# Patient Record
Sex: MALE | Race: BLACK OR AFRICAN AMERICAN | NOT HISPANIC OR LATINO | Employment: UNEMPLOYED | URBAN - METROPOLITAN AREA
[De-identification: names, ages, dates, MRNs, and addresses within clinical notes are randomized per-mention and may not be internally consistent; named-entity substitution may affect disease eponyms.]

---

## 2018-06-20 ENCOUNTER — HISTORICAL (OUTPATIENT)
Dept: ADMINISTRATIVE | Facility: HOSPITAL | Age: 38
End: 2018-06-20

## 2018-06-20 LAB
ALBUMIN SERPL-MCNC: 3.7 GM/DL (ref 3.4–5)
ALBUMIN/GLOB SERPL: 1 RATIO (ref 1–2)
ALP SERPL-CCNC: 88 UNIT/L (ref 45–117)
ALT SERPL-CCNC: 26 UNIT/L (ref 12–78)
AST SERPL-CCNC: 11 UNIT/L (ref 15–37)
BILIRUB SERPL-MCNC: 0.5 MG/DL (ref 0.2–1)
BILIRUBIN DIRECT+TOT PNL SERPL-MCNC: 0.2 MG/DL
BILIRUBIN DIRECT+TOT PNL SERPL-MCNC: 0.3 MG/DL
BUN SERPL-MCNC: 6 MG/DL (ref 7–18)
CALCIUM SERPL-MCNC: 9.3 MG/DL (ref 8.5–10.1)
CHLORIDE SERPL-SCNC: 106 MMOL/L (ref 98–107)
CO2 SERPL-SCNC: 27 MMOL/L (ref 21–32)
CREAT SERPL-MCNC: 1.3 MG/DL (ref 0.6–1.3)
ERYTHROCYTE [DISTWIDTH] IN BLOOD BY AUTOMATED COUNT: 19.4 % (ref 11.5–14.5)
GLOBULIN SER-MCNC: 4.3 GM/ML (ref 2.3–3.5)
GLUCOSE SERPL-MCNC: 84 MG/DL (ref 74–106)
HCT VFR BLD AUTO: 30.8 % (ref 40–51)
HGB BLD-MCNC: 8.6 GM/DL (ref 13.5–17.5)
MCH RBC QN AUTO: 19 PG (ref 26–34)
MCHC RBC AUTO-ENTMCNC: 27.9 GM/DL (ref 31–37)
MCV RBC AUTO: 68.1 FL (ref 80–100)
PLATELET # BLD AUTO: 517 X10(3)/MCL (ref 130–400)
PMV BLD AUTO: 9.7 FL (ref 7.4–10.4)
POTASSIUM SERPL-SCNC: 4.1 MMOL/L (ref 3.5–5.1)
PROT SERPL-MCNC: 8 GM/DL (ref 6.4–8.2)
RBC # BLD AUTO: 4.52 X10(6)/MCL (ref 4.5–5.9)
SODIUM SERPL-SCNC: 140 MMOL/L (ref 136–145)
WBC # SPEC AUTO: 3.9 X10(3)/MCL (ref 4.5–11)

## 2018-06-26 ENCOUNTER — HISTORICAL (OUTPATIENT)
Dept: ADMINISTRATIVE | Facility: HOSPITAL | Age: 38
End: 2018-06-26

## 2018-06-26 LAB
FERRITIN SERPL-MCNC: 8 NG/ML (ref 26–388)
FOLATE SERPL-MCNC: 11.9 NG/ML (ref 3.1–17.5)
IRON SATN MFR SERPL: 4.2 % (ref 15–50)
IRON SERPL-MCNC: 18 MCG/DL (ref 65–175)
TIBC SERPL-MCNC: 428 MCG/DL (ref 250–450)
TRANSFERRIN SERPL-MCNC: 359 MG/DL (ref 200–360)
VIT B12 SERPL-MCNC: 396 PG/ML (ref 193–986)

## 2018-06-27 ENCOUNTER — HISTORICAL (OUTPATIENT)
Dept: SURGERY | Facility: HOSPITAL | Age: 38
End: 2018-06-27

## 2018-07-03 ENCOUNTER — HISTORICAL (OUTPATIENT)
Dept: HEMATOLOGY/ONCOLOGY | Facility: CLINIC | Age: 38
End: 2018-07-03

## 2018-07-10 ENCOUNTER — HISTORICAL (OUTPATIENT)
Dept: INFUSION THERAPY | Facility: HOSPITAL | Age: 38
End: 2018-07-10

## 2018-07-10 LAB
ABS NEUT (OLG): 1.71 X10(3)/MCL (ref 2.1–9.2)
ALBUMIN SERPL-MCNC: 3.6 GM/DL (ref 3.4–5)
ALBUMIN/GLOB SERPL: 1 RATIO (ref 1–2)
ALP SERPL-CCNC: 75 UNIT/L (ref 45–117)
ALT SERPL-CCNC: 14 UNIT/L (ref 12–78)
AST SERPL-CCNC: 12 UNIT/L (ref 15–37)
BASOPHILS # BLD AUTO: 0.02 X10(3)/MCL
BASOPHILS NFR BLD AUTO: 0 %
BILIRUB SERPL-MCNC: 0.4 MG/DL (ref 0.2–1)
BILIRUBIN DIRECT+TOT PNL SERPL-MCNC: 0.1 MG/DL
BILIRUBIN DIRECT+TOT PNL SERPL-MCNC: 0.3 MG/DL
BUN SERPL-MCNC: 6 MG/DL (ref 7–18)
CALCIUM SERPL-MCNC: 8.8 MG/DL (ref 8.5–10.1)
CEA SERPL-MCNC: 30.1 NG/ML
CHLORIDE SERPL-SCNC: 105 MMOL/L (ref 98–107)
CO2 SERPL-SCNC: 25 MMOL/L (ref 21–32)
CREAT SERPL-MCNC: 1.3 MG/DL (ref 0.6–1.3)
EOSINOPHIL # BLD AUTO: 0.18 X10(3)/MCL
EOSINOPHIL NFR BLD AUTO: 5 %
ERYTHROCYTE [DISTWIDTH] IN BLOOD BY AUTOMATED COUNT: 18.1 % (ref 11.5–14.5)
GLOBULIN SER-MCNC: 4.1 GM/ML (ref 2.3–3.5)
GLUCOSE SERPL-MCNC: 97 MG/DL (ref 74–106)
HCT VFR BLD AUTO: 30.8 % (ref 40–51)
HGB BLD-MCNC: 8.5 GM/DL (ref 13.5–17.5)
IMM GRANULOCYTES # BLD AUTO: 0.01 10*3/UL
IMM GRANULOCYTES NFR BLD AUTO: 0 %
LYMPHOCYTES # BLD AUTO: 1.7 X10(3)/MCL
LYMPHOCYTES NFR BLD AUTO: 44 % (ref 13–40)
MCH RBC QN AUTO: 18.7 PG (ref 26–34)
MCHC RBC AUTO-ENTMCNC: 27.6 GM/DL (ref 31–37)
MCV RBC AUTO: 67.8 FL (ref 80–100)
MONOCYTES # BLD AUTO: 0.27 X10(3)/MCL
MONOCYTES NFR BLD AUTO: 7 % (ref 4–12)
NEUTROPHILS # BLD AUTO: 1.71 X10(3)/MCL
NEUTROPHILS NFR BLD AUTO: 44 X10(3)/MCL
PLATELET # BLD AUTO: 303 X10(3)/MCL (ref 130–400)
PMV BLD AUTO: 9.2 FL (ref 7.4–10.4)
POTASSIUM SERPL-SCNC: 3.8 MMOL/L (ref 3.5–5.1)
PROT SERPL-MCNC: 7.7 GM/DL (ref 6.4–8.2)
RBC # BLD AUTO: 4.54 X10(6)/MCL (ref 4.5–5.9)
SODIUM SERPL-SCNC: 138 MMOL/L (ref 136–145)
WBC # SPEC AUTO: 3.9 X10(3)/MCL (ref 4.5–11)

## 2018-07-12 ENCOUNTER — HISTORICAL (OUTPATIENT)
Dept: INFUSION THERAPY | Facility: HOSPITAL | Age: 38
End: 2018-07-12

## 2018-07-23 ENCOUNTER — HISTORICAL (OUTPATIENT)
Dept: ADMINISTRATIVE | Facility: HOSPITAL | Age: 38
End: 2018-07-23

## 2018-07-23 LAB
ABS NEUT (OLG): 0.65 X10(3)/MCL (ref 2.1–9.2)
ALBUMIN SERPL-MCNC: 3.6 GM/DL (ref 3.4–5)
ALBUMIN/GLOB SERPL: 1 RATIO (ref 1–2)
ALP SERPL-CCNC: 67 UNIT/L (ref 45–117)
ALT SERPL-CCNC: 15 UNIT/L (ref 12–78)
AST SERPL-CCNC: 10 UNIT/L (ref 15–37)
BASOPHILS # BLD AUTO: 0.02 X10(3)/MCL
BASOPHILS NFR BLD AUTO: 1 %
BILIRUB SERPL-MCNC: 0.4 MG/DL (ref 0.2–1)
BILIRUBIN DIRECT+TOT PNL SERPL-MCNC: 0.1 MG/DL
BILIRUBIN DIRECT+TOT PNL SERPL-MCNC: 0.3 MG/DL
BUN SERPL-MCNC: 5 MG/DL (ref 7–18)
CALCIUM SERPL-MCNC: 8.5 MG/DL (ref 8.5–10.1)
CEA SERPL-MCNC: 35.2 NG/ML
CHLORIDE SERPL-SCNC: 108 MMOL/L (ref 98–107)
CO2 SERPL-SCNC: 28 MMOL/L (ref 21–32)
CREAT SERPL-MCNC: 1.3 MG/DL (ref 0.6–1.3)
EOSINOPHIL # BLD AUTO: 0.12 10*3/UL
EOSINOPHIL NFR BLD AUTO: 5 %
ERYTHROCYTE [DISTWIDTH] IN BLOOD BY AUTOMATED COUNT: 18.6 % (ref 11.5–14.5)
GLOBULIN SER-MCNC: 3.8 GM/ML (ref 2.3–3.5)
GLUCOSE SERPL-MCNC: 92 MG/DL (ref 74–106)
HCT VFR BLD AUTO: 29.7 % (ref 40–51)
HGB BLD-MCNC: 8.3 GM/DL (ref 13.5–17.5)
IMM GRANULOCYTES # BLD AUTO: 0.01 10*3/UL
IMM GRANULOCYTES NFR BLD AUTO: 0 %
LYMPHOCYTES # BLD AUTO: 1.29 X10(3)/MCL
LYMPHOCYTES NFR BLD AUTO: 56 % (ref 13–40)
MCH RBC QN AUTO: 19.1 PG (ref 26–34)
MCHC RBC AUTO-ENTMCNC: 27.9 GM/DL (ref 31–37)
MCV RBC AUTO: 68.4 FL (ref 80–100)
MONOCYTES # BLD AUTO: 0.22 X10(3)/MCL
MONOCYTES NFR BLD AUTO: 10 % (ref 4–12)
NEUTROPHILS # BLD AUTO: 0.65 X10(3)/MCL
NEUTROPHILS NFR BLD AUTO: 28 X10(3)/MCL
PLATELET # BLD AUTO: 256 X10(3)/MCL (ref 130–400)
PMV BLD AUTO: 9.5 FL (ref 7.4–10.4)
POTASSIUM SERPL-SCNC: 3.9 MMOL/L (ref 3.5–5.1)
PROT SERPL-MCNC: 7.4 GM/DL (ref 6.4–8.2)
RBC # BLD AUTO: 4.34 X10(6)/MCL (ref 4.5–5.9)
SODIUM SERPL-SCNC: 144 MMOL/L (ref 136–145)
WBC # SPEC AUTO: 2.3 X10(3)/MCL (ref 4.5–11)

## 2018-07-31 ENCOUNTER — HISTORICAL (OUTPATIENT)
Dept: INFUSION THERAPY | Facility: HOSPITAL | Age: 38
End: 2018-07-31

## 2018-07-31 LAB
ABS NEUT (OLG): 0.7 X10(3)/MCL (ref 2.1–9.2)
ALBUMIN SERPL-MCNC: 3.7 GM/DL (ref 3.4–5)
ALBUMIN/GLOB SERPL: 1 RATIO (ref 1–2)
ALP SERPL-CCNC: 67 UNIT/L (ref 45–117)
ALT SERPL-CCNC: 16 UNIT/L (ref 12–78)
AST SERPL-CCNC: 12 UNIT/L (ref 15–37)
BASOPHILS # BLD AUTO: 0.02 X10(3)/MCL
BASOPHILS NFR BLD AUTO: 1 %
BILIRUB SERPL-MCNC: 0.4 MG/DL (ref 0.2–1)
BILIRUBIN DIRECT+TOT PNL SERPL-MCNC: 0.1 MG/DL
BILIRUBIN DIRECT+TOT PNL SERPL-MCNC: 0.3 MG/DL
BUN SERPL-MCNC: 9 MG/DL (ref 7–18)
CALCIUM SERPL-MCNC: 8.6 MG/DL (ref 8.5–10.1)
CHLORIDE SERPL-SCNC: 107 MMOL/L (ref 98–107)
CO2 SERPL-SCNC: 26 MMOL/L (ref 21–32)
CREAT SERPL-MCNC: 1.4 MG/DL (ref 0.6–1.3)
EOSINOPHIL # BLD AUTO: 0.02 10*3/UL
EOSINOPHIL NFR BLD AUTO: 1 %
ERYTHROCYTE [DISTWIDTH] IN BLOOD BY AUTOMATED COUNT: 18.6 % (ref 11.5–14.5)
GLOBULIN SER-MCNC: 3.7 GM/ML (ref 2.3–3.5)
GLUCOSE SERPL-MCNC: 93 MG/DL (ref 74–106)
HCT VFR BLD AUTO: 29.4 % (ref 40–51)
HGB BLD-MCNC: 8.3 GM/DL (ref 13.5–17.5)
IMM GRANULOCYTES # BLD AUTO: 0.01 10*3/UL
IMM GRANULOCYTES NFR BLD AUTO: 0 %
LYMPHOCYTES # BLD AUTO: 1.32 X10(3)/MCL
LYMPHOCYTES NFR BLD AUTO: 54 % (ref 13–40)
MCH RBC QN AUTO: 19.1 PG (ref 26–34)
MCHC RBC AUTO-ENTMCNC: 28.2 GM/DL (ref 31–37)
MCV RBC AUTO: 67.6 FL (ref 80–100)
MONOCYTES # BLD AUTO: 0.37 X10(3)/MCL
MONOCYTES NFR BLD AUTO: 15 % (ref 4–12)
NEUTROPHILS # BLD AUTO: 0.7 X10(3)/MCL
NEUTROPHILS NFR BLD AUTO: 29 X10(3)/MCL
PLATELET # BLD AUTO: 255 X10(3)/MCL (ref 130–400)
PMV BLD AUTO: 9 FL (ref 7.4–10.4)
POTASSIUM SERPL-SCNC: 3.8 MMOL/L (ref 3.5–5.1)
PROT SERPL-MCNC: 7.4 GM/DL (ref 6.4–8.2)
RBC # BLD AUTO: 4.35 X10(6)/MCL (ref 4.5–5.9)
SODIUM SERPL-SCNC: 141 MMOL/L (ref 136–145)
WBC # SPEC AUTO: 2.4 X10(3)/MCL (ref 4.5–11)

## 2018-08-07 ENCOUNTER — HISTORICAL (OUTPATIENT)
Dept: INFUSION THERAPY | Facility: HOSPITAL | Age: 38
End: 2018-08-07

## 2018-08-07 LAB
ABS NEUT (OLG): 1.55 X10(3)/MCL (ref 2.1–9.2)
ALBUMIN SERPL-MCNC: 3.6 GM/DL (ref 3.4–5)
ALBUMIN/GLOB SERPL: 1 RATIO (ref 1–2)
ALP SERPL-CCNC: 71 UNIT/L (ref 45–117)
ALT SERPL-CCNC: 15 UNIT/L (ref 12–78)
AST SERPL-CCNC: 13 UNIT/L (ref 15–37)
BASOPHILS # BLD AUTO: 0.03 X10(3)/MCL
BASOPHILS NFR BLD AUTO: 1 %
BILIRUB SERPL-MCNC: 0.4 MG/DL (ref 0.2–1)
BILIRUBIN DIRECT+TOT PNL SERPL-MCNC: 0.1 MG/DL
BILIRUBIN DIRECT+TOT PNL SERPL-MCNC: 0.3 MG/DL
BUN SERPL-MCNC: 7 MG/DL (ref 7–18)
CALCIUM SERPL-MCNC: 8.4 MG/DL (ref 8.5–10.1)
CHLORIDE SERPL-SCNC: 106 MMOL/L (ref 98–107)
CO2 SERPL-SCNC: 28 MMOL/L (ref 21–32)
CREAT SERPL-MCNC: 1.3 MG/DL (ref 0.6–1.3)
EOSINOPHIL # BLD AUTO: 0.07 10*3/UL
EOSINOPHIL NFR BLD AUTO: 2 %
ERYTHROCYTE [DISTWIDTH] IN BLOOD BY AUTOMATED COUNT: 18.6 % (ref 11.5–14.5)
GLOBULIN SER-MCNC: 3.9 GM/ML (ref 2.3–3.5)
GLUCOSE SERPL-MCNC: 105 MG/DL (ref 74–106)
HCT VFR BLD AUTO: 31.1 % (ref 40–51)
HGB BLD-MCNC: 8.7 GM/DL (ref 13.5–17.5)
LYMPHOCYTES # BLD AUTO: 1.55 X10(3)/MCL
LYMPHOCYTES NFR BLD AUTO: 44 % (ref 13–40)
MCH RBC QN AUTO: 19.1 PG (ref 26–34)
MCHC RBC AUTO-ENTMCNC: 28 GM/DL (ref 31–37)
MCV RBC AUTO: 68.4 FL (ref 80–100)
MONOCYTES # BLD AUTO: 0.33 X10(3)/MCL
MONOCYTES NFR BLD AUTO: 9 % (ref 4–12)
NEUTROPHILS # BLD AUTO: 1.55 X10(3)/MCL
NEUTROPHILS NFR BLD AUTO: 44 X10(3)/MCL
PLATELET # BLD AUTO: 263 X10(3)/MCL (ref 130–400)
PMV BLD AUTO: 9.5 FL (ref 7.4–10.4)
POTASSIUM SERPL-SCNC: 3.9 MMOL/L (ref 3.5–5.1)
PROT SERPL-MCNC: 7.5 GM/DL (ref 6.4–8.2)
RBC # BLD AUTO: 4.55 X10(6)/MCL (ref 4.5–5.9)
SODIUM SERPL-SCNC: 141 MMOL/L (ref 136–145)
WBC # SPEC AUTO: 3.5 X10(3)/MCL (ref 4.5–11)

## 2018-08-09 ENCOUNTER — HISTORICAL (OUTPATIENT)
Dept: INFUSION THERAPY | Facility: HOSPITAL | Age: 38
End: 2018-08-09

## 2018-08-16 ENCOUNTER — HISTORICAL (OUTPATIENT)
Dept: ADMINISTRATIVE | Facility: HOSPITAL | Age: 38
End: 2018-08-16

## 2018-08-16 LAB
ABS NEUT (OLG): 1.32 X10(3)/MCL (ref 2.1–9.2)
ALBUMIN SERPL-MCNC: 3.6 GM/DL (ref 3.4–5)
ALBUMIN/GLOB SERPL: 1 RATIO (ref 1–2)
ALP SERPL-CCNC: 74 UNIT/L (ref 45–117)
ALT SERPL-CCNC: 16 UNIT/L (ref 12–78)
AST SERPL-CCNC: 11 UNIT/L (ref 15–37)
BASOPHILS # BLD AUTO: 0.01 X10(3)/MCL
BASOPHILS NFR BLD AUTO: 0 %
BILIRUB SERPL-MCNC: 0.4 MG/DL (ref 0.2–1)
BILIRUBIN DIRECT+TOT PNL SERPL-MCNC: 0.1 MG/DL
BILIRUBIN DIRECT+TOT PNL SERPL-MCNC: 0.3 MG/DL
BUN SERPL-MCNC: 8 MG/DL (ref 7–18)
CALCIUM SERPL-MCNC: 8.8 MG/DL (ref 8.5–10.1)
CEA SERPL-MCNC: 24.3 NG/ML
CHLORIDE SERPL-SCNC: 106 MMOL/L (ref 98–107)
CO2 SERPL-SCNC: 28 MMOL/L (ref 21–32)
CREAT SERPL-MCNC: 1.4 MG/DL (ref 0.6–1.3)
EOSINOPHIL # BLD AUTO: 0.03 10*3/UL
EOSINOPHIL NFR BLD AUTO: 1 %
ERYTHROCYTE [DISTWIDTH] IN BLOOD BY AUTOMATED COUNT: 19.6 % (ref 11.5–14.5)
FERRITIN SERPL-MCNC: 5.2 NG/ML (ref 26–388)
GLOBULIN SER-MCNC: 4 GM/ML (ref 2.3–3.5)
GLUCOSE SERPL-MCNC: 89 MG/DL (ref 74–106)
HCT VFR BLD AUTO: 31 % (ref 40–51)
HGB BLD-MCNC: 8.7 GM/DL (ref 13.5–17.5)
IMM GRANULOCYTES # BLD AUTO: 0.02 10*3/UL
IMM GRANULOCYTES NFR BLD AUTO: 1 %
IRON SATN MFR SERPL: 4.4 % (ref 15–50)
IRON SERPL-MCNC: 19 MCG/DL (ref 65–175)
LYMPHOCYTES # BLD AUTO: 1.38 X10(3)/MCL
LYMPHOCYTES NFR BLD AUTO: 46 % (ref 13–40)
MCH RBC QN AUTO: 19.2 PG (ref 26–34)
MCHC RBC AUTO-ENTMCNC: 28.1 GM/DL (ref 31–37)
MCV RBC AUTO: 68.6 FL (ref 80–100)
MONOCYTES # BLD AUTO: 0.25 X10(3)/MCL
MONOCYTES NFR BLD AUTO: 8 % (ref 4–12)
NEUTROPHILS # BLD AUTO: 1.32 X10(3)/MCL
NEUTROPHILS NFR BLD AUTO: 44 X10(3)/MCL
PLATELET # BLD AUTO: 297 X10(3)/MCL (ref 130–400)
PMV BLD AUTO: 9.9 FL (ref 7.4–10.4)
POTASSIUM SERPL-SCNC: 4 MMOL/L (ref 3.5–5.1)
PROT SERPL-MCNC: 7.6 GM/DL (ref 6.4–8.2)
RBC # BLD AUTO: 4.52 X10(6)/MCL (ref 4.5–5.9)
SODIUM SERPL-SCNC: 140 MMOL/L (ref 136–145)
TIBC SERPL-MCNC: 430 MCG/DL (ref 250–450)
TRANSFERRIN SERPL-MCNC: 342 MG/DL (ref 200–360)
WBC # SPEC AUTO: 3 X10(3)/MCL (ref 4.5–11)

## 2018-08-20 ENCOUNTER — HISTORICAL (OUTPATIENT)
Dept: INFUSION THERAPY | Facility: HOSPITAL | Age: 38
End: 2018-08-20

## 2018-08-20 LAB — PROT UR STRIP-MCNC: 10.4 MG/DL

## 2018-08-22 ENCOUNTER — HISTORICAL (OUTPATIENT)
Dept: INFUSION THERAPY | Facility: HOSPITAL | Age: 38
End: 2018-08-22

## 2018-08-24 ENCOUNTER — HISTORICAL (OUTPATIENT)
Dept: INFUSION THERAPY | Facility: HOSPITAL | Age: 38
End: 2018-08-24

## 2018-08-31 ENCOUNTER — HISTORICAL (OUTPATIENT)
Dept: INFUSION THERAPY | Facility: HOSPITAL | Age: 38
End: 2018-08-31

## 2018-09-04 ENCOUNTER — HISTORICAL (OUTPATIENT)
Dept: ADMINISTRATIVE | Facility: HOSPITAL | Age: 38
End: 2018-09-04

## 2018-09-04 LAB
ABS NEUT (OLG): 1.21 X10(3)/MCL (ref 2.1–9.2)
ALBUMIN SERPL-MCNC: 3.6 GM/DL (ref 3.4–5)
ALBUMIN/GLOB SERPL: 1 RATIO (ref 1–2)
ALP SERPL-CCNC: 78 UNIT/L (ref 45–117)
ALT SERPL-CCNC: 18 UNIT/L (ref 12–78)
APPEARANCE, UA: CLEAR
AST SERPL-CCNC: 17 UNIT/L (ref 15–37)
BACTERIA #/AREA URNS AUTO: ABNORMAL /[HPF]
BASOPHILS # BLD AUTO: 0.02 X10(3)/MCL
BASOPHILS NFR BLD AUTO: 1 %
BILIRUB SERPL-MCNC: 0.3 MG/DL (ref 0.2–1)
BILIRUB UR QL STRIP: NEGATIVE
BILIRUBIN DIRECT+TOT PNL SERPL-MCNC: <0.1 MG/DL
BILIRUBIN DIRECT+TOT PNL SERPL-MCNC: ABNORMAL MG/DL
BUN SERPL-MCNC: 6 MG/DL (ref 7–18)
CALCIUM SERPL-MCNC: 8.8 MG/DL (ref 8.5–10.1)
CHLORIDE SERPL-SCNC: 107 MMOL/L (ref 98–107)
CO2 SERPL-SCNC: 30 MMOL/L (ref 21–32)
COLOR UR: NORMAL
CREAT SERPL-MCNC: 1.4 MG/DL (ref 0.6–1.3)
EOSINOPHIL # BLD AUTO: 0.06 10*3/UL
EOSINOPHIL NFR BLD AUTO: 2 %
ERYTHROCYTE [DISTWIDTH] IN BLOOD BY AUTOMATED COUNT: 27.1 % (ref 11.5–14.5)
GLOBULIN SER-MCNC: 3.9 GM/ML (ref 2.3–3.5)
GLUCOSE (UA): NORMAL
GLUCOSE SERPL-MCNC: 87 MG/DL (ref 74–106)
HCT VFR BLD AUTO: 35.7 % (ref 40–51)
HGB BLD-MCNC: 10.2 GM/DL (ref 13.5–17.5)
HGB UR QL STRIP: NEGATIVE
HYALINE CASTS #/AREA URNS LPF: ABNORMAL /[LPF]
IMM GRANULOCYTES # BLD AUTO: 0.01 10*3/UL
IMM GRANULOCYTES NFR BLD AUTO: 0 %
KETONES UR QL STRIP: NEGATIVE
LEUKOCYTE ESTERASE UR QL STRIP: NEGATIVE
LYMPHOCYTES # BLD AUTO: 1.87 X10(3)/MCL
LYMPHOCYTES NFR BLD AUTO: 53 % (ref 13–40)
MCH RBC QN AUTO: 20.9 PG (ref 26–34)
MCHC RBC AUTO-ENTMCNC: 28.6 GM/DL (ref 31–37)
MCV RBC AUTO: 73.2 FL (ref 80–100)
MONOCYTES # BLD AUTO: 0.36 X10(3)/MCL
MONOCYTES NFR BLD AUTO: 10 % (ref 4–12)
NEUTROPHILS # BLD AUTO: 1.21 X10(3)/MCL
NEUTROPHILS NFR BLD AUTO: 34 X10(3)/MCL
NITRITE UR QL STRIP: NEGATIVE
PH UR STRIP: 5.5 [PH] (ref 4.5–8)
PLATELET # BLD AUTO: 181 X10(3)/MCL (ref 130–400)
PMV BLD AUTO: 11 FL (ref 7.4–10.4)
POTASSIUM SERPL-SCNC: 3.7 MMOL/L (ref 3.5–5.1)
PROT SERPL-MCNC: 7.5 GM/DL (ref 6.4–8.2)
PROT UR QL STRIP: NEGATIVE
RBC # BLD AUTO: 4.88 X10(6)/MCL (ref 4.5–5.9)
RBC #/AREA URNS AUTO: ABNORMAL /[HPF]
SODIUM SERPL-SCNC: 142 MMOL/L (ref 136–145)
SP GR UR STRIP: 1.02 (ref 1–1.03)
SQUAMOUS #/AREA URNS LPF: ABNORMAL /[LPF]
UROBILINOGEN UR STRIP-ACNC: NORMAL
WBC # SPEC AUTO: 3.5 X10(3)/MCL (ref 4.5–11)
WBC #/AREA URNS AUTO: ABNORMAL /HPF

## 2018-09-05 ENCOUNTER — HISTORICAL (OUTPATIENT)
Dept: INFUSION THERAPY | Facility: HOSPITAL | Age: 38
End: 2018-09-05

## 2018-09-07 ENCOUNTER — HISTORICAL (OUTPATIENT)
Dept: INFUSION THERAPY | Facility: HOSPITAL | Age: 38
End: 2018-09-07

## 2018-09-11 ENCOUNTER — HISTORICAL (OUTPATIENT)
Dept: RADIOLOGY | Facility: HOSPITAL | Age: 38
End: 2018-09-11

## 2018-09-18 ENCOUNTER — HISTORICAL (OUTPATIENT)
Dept: ADMINISTRATIVE | Facility: HOSPITAL | Age: 38
End: 2018-09-18

## 2018-09-18 LAB
ABS NEUT (OLG): 0.75 X10(3)/MCL (ref 2.1–9.2)
ALBUMIN SERPL-MCNC: 3.5 GM/DL (ref 3.4–5)
ALBUMIN/GLOB SERPL: 1 RATIO (ref 1–2)
ALP SERPL-CCNC: 68 UNIT/L (ref 45–117)
ALT SERPL-CCNC: 20 UNIT/L (ref 12–78)
AST SERPL-CCNC: 14 UNIT/L (ref 15–37)
BASOPHILS # BLD AUTO: 0.02 X10(3)/MCL
BASOPHILS NFR BLD AUTO: 1 %
BILIRUB SERPL-MCNC: 0.3 MG/DL (ref 0.2–1)
BILIRUBIN DIRECT+TOT PNL SERPL-MCNC: <0.1 MG/DL
BILIRUBIN DIRECT+TOT PNL SERPL-MCNC: ABNORMAL MG/DL
BUN SERPL-MCNC: 8 MG/DL (ref 7–18)
CALCIUM SERPL-MCNC: 8.6 MG/DL (ref 8.5–10.1)
CEA SERPL-MCNC: 13.6 NG/ML
CHLORIDE SERPL-SCNC: 105 MMOL/L (ref 98–107)
CO2 SERPL-SCNC: 28 MMOL/L (ref 21–32)
CREAT SERPL-MCNC: 1.1 MG/DL (ref 0.6–1.3)
EOSINOPHIL # BLD AUTO: 0.04 X10(3)/MCL
EOSINOPHIL NFR BLD AUTO: 2 %
ERYTHROCYTE [DISTWIDTH] IN BLOOD BY AUTOMATED COUNT: 27.5 % (ref 11.5–14.5)
GLOBULIN SER-MCNC: 3.7 GM/ML (ref 2.3–3.5)
GLUCOSE SERPL-MCNC: 80 MG/DL (ref 74–106)
HCT VFR BLD AUTO: 36.2 % (ref 40–51)
HGB BLD-MCNC: 10.6 GM/DL (ref 13.5–17.5)
LYMPHOCYTES # BLD AUTO: 1.53 X10(3)/MCL
LYMPHOCYTES NFR BLD AUTO: 56 % (ref 13–40)
MCH RBC QN AUTO: 21.9 PG (ref 26–34)
MCHC RBC AUTO-ENTMCNC: 29.3 GM/DL (ref 31–37)
MCV RBC AUTO: 74.6 FL (ref 80–100)
MONOCYTES # BLD AUTO: 0.38 X10(3)/MCL
MONOCYTES NFR BLD AUTO: 14 % (ref 4–12)
NEUTROPHILS # BLD AUTO: 0.75 X10(3)/MCL
NEUTROPHILS NFR BLD AUTO: 28 X10(3)/MCL
PLATELET # BLD AUTO: 201 X10(3)/MCL (ref 130–400)
PMV BLD AUTO: 10.2 FL (ref 7.4–10.4)
POTASSIUM SERPL-SCNC: 3.5 MMOL/L (ref 3.5–5.1)
PROT SERPL-MCNC: 7.2 GM/DL (ref 6.4–8.2)
RBC # BLD AUTO: 4.85 X10(6)/MCL (ref 4.5–5.9)
SODIUM SERPL-SCNC: 140 MMOL/L (ref 136–145)
WBC # SPEC AUTO: 2.7 X10(3)/MCL (ref 4.5–11)

## 2018-09-24 ENCOUNTER — HISTORICAL (OUTPATIENT)
Dept: INFUSION THERAPY | Facility: HOSPITAL | Age: 38
End: 2018-09-24

## 2018-09-24 LAB
ABS NEUT (OLG): 1.21 X10(3)/MCL
ALBUMIN SERPL-MCNC: 3.3 GM/DL (ref 3.4–5)
ALBUMIN/GLOB SERPL: 1 RATIO (ref 1–2)
ALP SERPL-CCNC: 66 UNIT/L (ref 45–117)
ALT SERPL-CCNC: 24 UNIT/L (ref 12–78)
ANISOCYTOSIS BLD QL SMEAR: NORMAL
AST SERPL-CCNC: 24 UNIT/L (ref 15–37)
BASOPHILS # BLD AUTO: 0.02 X10(3)/MCL
BASOPHILS NFR BLD AUTO: 1 %
BILIRUB SERPL-MCNC: 0.3 MG/DL (ref 0.2–1)
BILIRUBIN DIRECT+TOT PNL SERPL-MCNC: <0.1 MG/DL
BILIRUBIN DIRECT+TOT PNL SERPL-MCNC: ABNORMAL MG/DL
BUN SERPL-MCNC: 7 MG/DL (ref 7–18)
CALCIUM SERPL-MCNC: 8.6 MG/DL (ref 8.5–10.1)
CHLORIDE SERPL-SCNC: 106 MMOL/L (ref 98–107)
CO2 SERPL-SCNC: 29 MMOL/L (ref 21–32)
CREAT SERPL-MCNC: 1.2 MG/DL (ref 0.6–1.3)
EOSINOPHIL # BLD AUTO: 0.03 X10(3)/MCL
EOSINOPHIL NFR BLD AUTO: 1 %
ERYTHROCYTE [DISTWIDTH] IN BLOOD BY AUTOMATED COUNT: 28.3 % (ref 11.5–14.5)
GLOBULIN SER-MCNC: 3.8 GM/ML (ref 2.3–3.5)
GLUCOSE SERPL-MCNC: 88 MG/DL (ref 74–106)
HCT VFR BLD AUTO: 37.3 % (ref 40–51)
HGB BLD-MCNC: 11 GM/DL (ref 13.5–17.5)
HYPOCHROMIA BLD QL SMEAR: NORMAL
IMM GRANULOCYTES # BLD AUTO: 0.01 10*3/UL
IMM GRANULOCYTES NFR BLD AUTO: 0 %
LYMPHOCYTES # BLD AUTO: 1.63 X10(3)/MCL
LYMPHOCYTES NFR BLD AUTO: 49 % (ref 13–40)
MCH RBC QN AUTO: 22.2 PG (ref 26–34)
MCHC RBC AUTO-ENTMCNC: 29.5 GM/DL (ref 31–37)
MCV RBC AUTO: 75.2 FL (ref 80–100)
MICROCYTES BLD QL SMEAR: NORMAL
MONOCYTES # BLD AUTO: 0.4 X10(3)/MCL
MONOCYTES NFR BLD AUTO: 12 % (ref 4–12)
NEUTROPHILS # BLD AUTO: 1.21 X10(3)/MCL
NEUTROPHILS NFR BLD AUTO: 37 %
OVALOCYTES BLD QL SMEAR: NORMAL
PLATELET # BLD AUTO: 229 X10(3)/MCL (ref 130–400)
PLATELET # BLD EST: ADEQUATE 10*3/UL
PMV BLD AUTO: 9.5 FL (ref 7.4–10.4)
POIKILOCYTOSIS BLD QL SMEAR: NORMAL
POLYCHROMASIA BLD QL SMEAR: NORMAL
POTASSIUM SERPL-SCNC: 3.7 MMOL/L (ref 3.5–5.1)
PROT SERPL-MCNC: 7.1 GM/DL (ref 6.4–8.2)
PROT UR STRIP-MCNC: 10.4 MG/DL
RBC # BLD AUTO: 4.96 X10(6)/MCL (ref 4.5–5.9)
RBC MORPH BLD: NORMAL
SODIUM SERPL-SCNC: 141 MMOL/L (ref 136–145)
WBC # SPEC AUTO: 3.3 X10(3)/MCL (ref 4.5–11)

## 2018-09-26 ENCOUNTER — HISTORICAL (OUTPATIENT)
Dept: INFUSION THERAPY | Facility: HOSPITAL | Age: 38
End: 2018-09-26

## 2018-09-27 ENCOUNTER — HISTORICAL (OUTPATIENT)
Dept: ENDOSCOPY | Facility: HOSPITAL | Age: 38
End: 2018-09-27

## 2018-10-08 ENCOUNTER — HISTORICAL (OUTPATIENT)
Dept: INFUSION THERAPY | Facility: HOSPITAL | Age: 38
End: 2018-10-08

## 2018-10-08 LAB
ABS NEUT (OLG): 2.09 X10(3)/MCL (ref 2.1–9.2)
ALBUMIN SERPL-MCNC: 3 GM/DL (ref 3.4–5)
ALBUMIN/GLOB SERPL: 1 RATIO (ref 1–2)
ALP SERPL-CCNC: 86 UNIT/L (ref 45–117)
ALT SERPL-CCNC: 21 UNIT/L (ref 12–78)
ANISOCYTOSIS BLD QL SMEAR: ABNORMAL
APPEARANCE, UA: CLEAR
AST SERPL-CCNC: 25 UNIT/L (ref 15–37)
BACTERIA #/AREA URNS AUTO: ABNORMAL /[HPF]
BASOPHILS # BLD AUTO: 0.01 X10(3)/MCL
BASOPHILS NFR BLD AUTO: 0 %
BASOPHILS NFR BLD MANUAL: 0 %
BILIRUB SERPL-MCNC: 0.2 MG/DL (ref 0.2–1)
BILIRUB UR QL STRIP: NEGATIVE
BILIRUBIN DIRECT+TOT PNL SERPL-MCNC: <0.1 MG/DL
BILIRUBIN DIRECT+TOT PNL SERPL-MCNC: ABNORMAL MG/DL
BUN SERPL-MCNC: 8 MG/DL (ref 7–18)
CALCIUM SERPL-MCNC: 8.4 MG/DL (ref 8.5–10.1)
CHLORIDE SERPL-SCNC: 107 MMOL/L (ref 98–107)
CO2 SERPL-SCNC: 30 MMOL/L (ref 21–32)
COLOR UR: NORMAL
CREAT SERPL-MCNC: 1.3 MG/DL (ref 0.6–1.3)
EOSINOPHIL # BLD AUTO: 0.07 10*3/UL
EOSINOPHIL NFR BLD AUTO: 2 %
EOSINOPHIL NFR BLD MANUAL: 0 %
ERYTHROCYTE [DISTWIDTH] IN BLOOD BY AUTOMATED COUNT: 26.4 % (ref 11.5–14.5)
GLOBULIN SER-MCNC: 4.4 GM/ML (ref 2.3–3.5)
GLUCOSE (UA): NORMAL
GLUCOSE SERPL-MCNC: 120 MG/DL (ref 74–106)
GRANULOCYTES NFR BLD MANUAL: 60 % (ref 43–75)
HCT VFR BLD AUTO: 36.3 % (ref 40–51)
HGB BLD-MCNC: 10.8 GM/DL (ref 13.5–17.5)
HGB UR QL STRIP: NEGATIVE
HYALINE CASTS #/AREA URNS LPF: ABNORMAL /[LPF]
HYPOCHROMIA BLD QL SMEAR: ABNORMAL
IMM GRANULOCYTES # BLD AUTO: 0.01 10*3/UL
IMM GRANULOCYTES NFR BLD AUTO: 0 %
KETONES UR QL STRIP: NEGATIVE
LEUKOCYTE ESTERASE UR QL STRIP: NEGATIVE
LYMPHOCYTES # BLD AUTO: 1.83 X10(3)/MCL
LYMPHOCYTES NFR BLD AUTO: 42 % (ref 13–40)
LYMPHOCYTES NFR BLD MANUAL: 1 %
LYMPHOCYTES NFR BLD MANUAL: 27 % (ref 20.5–51.1)
MACROCYTES BLD QL SMEAR: ABNORMAL
MCH RBC QN AUTO: 22.9 PG (ref 26–34)
MCHC RBC AUTO-ENTMCNC: 29.8 GM/DL (ref 31–37)
MCV RBC AUTO: 77.1 FL (ref 80–100)
MICROCYTES BLD QL SMEAR: ABNORMAL
MONOCYTES # BLD AUTO: 0.37 X10(3)/MCL
MONOCYTES NFR BLD AUTO: 8 % (ref 4–12)
MONOCYTES NFR BLD MANUAL: 12 % (ref 2–9)
NEUTROPHILS # BLD AUTO: 2.09 X10(3)/MCL
NEUTROPHILS NFR BLD AUTO: 48 X10(3)/MCL
NITRITE UR QL STRIP: NEGATIVE
OVALOCYTES BLD QL SMEAR: ABNORMAL
PH UR STRIP: 6 [PH] (ref 4.5–8)
PLATELET # BLD AUTO: 249 X10(3)/MCL (ref 130–400)
PLATELET # BLD EST: ADEQUATE 10*3/UL
PMV BLD AUTO: 9.5 FL (ref 7.4–10.4)
POIKILOCYTOSIS BLD QL SMEAR: ABNORMAL
POLYCHROMASIA BLD QL SMEAR: ABNORMAL
POTASSIUM SERPL-SCNC: 3.5 MMOL/L (ref 3.5–5.1)
PROT SERPL-MCNC: 7.4 GM/DL (ref 6.4–8.2)
PROT UR QL STRIP: NEGATIVE
RBC # BLD AUTO: 4.71 X10(6)/MCL (ref 4.5–5.9)
RBC #/AREA URNS AUTO: ABNORMAL /[HPF]
RBC MORPH BLD: ABNORMAL
SODIUM SERPL-SCNC: 141 MMOL/L (ref 136–145)
SP GR UR STRIP: 1.01 (ref 1–1.03)
SQUAMOUS #/AREA URNS LPF: ABNORMAL /[LPF]
UROBILINOGEN UR STRIP-ACNC: NORMAL
WBC # SPEC AUTO: 4.4 X10(3)/MCL (ref 4.5–11)
WBC #/AREA URNS AUTO: ABNORMAL /HPF

## 2018-10-15 ENCOUNTER — HISTORICAL (OUTPATIENT)
Dept: INFUSION THERAPY | Facility: HOSPITAL | Age: 38
End: 2018-10-15

## 2018-10-15 LAB
ABS NEUT (OLG): 1.56 X10(3)/MCL (ref 2.1–9.2)
ALBUMIN SERPL-MCNC: 3.6 GM/DL (ref 3.4–5)
ALBUMIN/GLOB SERPL: 1 RATIO (ref 1–2)
ALP SERPL-CCNC: 90 UNIT/L (ref 45–117)
ALT SERPL-CCNC: 21 UNIT/L (ref 12–78)
APPEARANCE, UA: CLEAR
AST SERPL-CCNC: 21 UNIT/L (ref 15–37)
BACTERIA #/AREA URNS AUTO: ABNORMAL /[HPF]
BASOPHILS # BLD AUTO: 0.03 X10(3)/MCL
BASOPHILS NFR BLD AUTO: 1 %
BILIRUB SERPL-MCNC: 0.3 MG/DL (ref 0.2–1)
BILIRUB UR QL STRIP: NEGATIVE
BILIRUBIN DIRECT+TOT PNL SERPL-MCNC: <0.1 MG/DL
BILIRUBIN DIRECT+TOT PNL SERPL-MCNC: ABNORMAL MG/DL
BUN SERPL-MCNC: 14 MG/DL (ref 7–18)
CALCIUM SERPL-MCNC: 9.1 MG/DL (ref 8.5–10.1)
CHLORIDE SERPL-SCNC: 103 MMOL/L (ref 98–107)
CO2 SERPL-SCNC: 31 MMOL/L (ref 21–32)
COLOR UR: NORMAL
CREAT SERPL-MCNC: 1.4 MG/DL (ref 0.6–1.3)
EOSINOPHIL # BLD AUTO: 0.06 X10(3)/MCL
EOSINOPHIL NFR BLD AUTO: 2 %
ERYTHROCYTE [DISTWIDTH] IN BLOOD BY AUTOMATED COUNT: 27.9 % (ref 11.5–14.5)
GLOBULIN SER-MCNC: 4.6 GM/ML (ref 2.3–3.5)
GLUCOSE (UA): NORMAL
GLUCOSE SERPL-MCNC: 91 MG/DL (ref 74–106)
HCT VFR BLD AUTO: 39.8 % (ref 40–51)
HGB BLD-MCNC: 11.8 GM/DL (ref 13.5–17.5)
HGB UR QL STRIP: NEGATIVE
HYALINE CASTS #/AREA URNS LPF: ABNORMAL /[LPF]
IMM GRANULOCYTES # BLD AUTO: 0.01 10*3/UL
IMM GRANULOCYTES NFR BLD AUTO: 0 %
KETONES UR QL STRIP: NEGATIVE
LEUKOCYTE ESTERASE UR QL STRIP: NEGATIVE
LYMPHOCYTES # BLD AUTO: 1.52 X10(3)/MCL
LYMPHOCYTES NFR BLD AUTO: 42 % (ref 13–40)
MCH RBC QN AUTO: 23.2 PG (ref 26–34)
MCHC RBC AUTO-ENTMCNC: 29.6 GM/DL (ref 31–37)
MCV RBC AUTO: 78.2 FL (ref 80–100)
MONOCYTES # BLD AUTO: 0.43 X10(3)/MCL
MONOCYTES NFR BLD AUTO: 12 % (ref 4–12)
NEUTROPHILS # BLD AUTO: 1.56 X10(3)/MCL
NEUTROPHILS NFR BLD AUTO: 43 X10(3)/MCL
NITRITE UR QL STRIP: NEGATIVE
PH UR STRIP: 6.5 [PH] (ref 4.5–8)
PLATELET # BLD AUTO: 324 X10(3)/MCL (ref 130–400)
PMV BLD AUTO: 9.3 FL (ref 7.4–10.4)
POTASSIUM SERPL-SCNC: 3.7 MMOL/L (ref 3.5–5.1)
PROT SERPL-MCNC: 8.2 GM/DL (ref 6.4–8.2)
PROT UR QL STRIP: NEGATIVE
RBC # BLD AUTO: 5.09 X10(6)/MCL (ref 4.5–5.9)
RBC #/AREA URNS AUTO: ABNORMAL /[HPF]
SODIUM SERPL-SCNC: 140 MMOL/L (ref 136–145)
SP GR UR STRIP: 1.01 (ref 1–1.03)
SQUAMOUS #/AREA URNS LPF: ABNORMAL /[LPF]
UROBILINOGEN UR STRIP-ACNC: NORMAL
WBC # SPEC AUTO: 3.6 X10(3)/MCL (ref 4.5–11)
WBC #/AREA URNS AUTO: ABNORMAL /HPF

## 2018-10-17 ENCOUNTER — HISTORICAL (OUTPATIENT)
Dept: INFUSION THERAPY | Facility: HOSPITAL | Age: 38
End: 2018-10-17

## 2018-10-26 ENCOUNTER — HISTORICAL (OUTPATIENT)
Dept: ADMINISTRATIVE | Facility: HOSPITAL | Age: 38
End: 2018-10-26

## 2018-10-26 LAB
ABS NEUT (OLG): 1.12 X10(3)/MCL (ref 2.1–9.2)
ALBUMIN SERPL-MCNC: 3.3 GM/DL (ref 3.4–5)
ALBUMIN/GLOB SERPL: 1 RATIO (ref 1–2)
ALP SERPL-CCNC: 72 UNIT/L (ref 45–117)
ALT SERPL-CCNC: 25 UNIT/L (ref 12–78)
APPEARANCE, UA: CLEAR
AST SERPL-CCNC: 24 UNIT/L (ref 15–37)
BACTERIA #/AREA URNS AUTO: ABNORMAL /[HPF]
BASOPHILS # BLD AUTO: 0.01 X10(3)/MCL
BASOPHILS NFR BLD AUTO: 0 %
BILIRUB SERPL-MCNC: 0.3 MG/DL (ref 0.2–1)
BILIRUB UR QL STRIP: NEGATIVE
BILIRUBIN DIRECT+TOT PNL SERPL-MCNC: <0.1 MG/DL
BILIRUBIN DIRECT+TOT PNL SERPL-MCNC: ABNORMAL MG/DL
BUN SERPL-MCNC: 9 MG/DL (ref 7–18)
CALCIUM SERPL-MCNC: 8.8 MG/DL (ref 8.5–10.1)
CEA SERPL-MCNC: 5.2 NG/ML
CHLORIDE SERPL-SCNC: 108 MMOL/L (ref 98–107)
CO2 SERPL-SCNC: 28 MMOL/L (ref 21–32)
COLOR UR: NORMAL
CREAT SERPL-MCNC: 1.3 MG/DL (ref 0.6–1.3)
EOSINOPHIL # BLD AUTO: 0.02 X10(3)/MCL
EOSINOPHIL NFR BLD AUTO: 1 %
ERYTHROCYTE [DISTWIDTH] IN BLOOD BY AUTOMATED COUNT: 26.5 % (ref 11.5–14.5)
GLOBULIN SER-MCNC: 3.9 GM/ML (ref 2.3–3.5)
GLUCOSE (UA): NORMAL
GLUCOSE SERPL-MCNC: 102 MG/DL (ref 74–106)
HCT VFR BLD AUTO: 39 % (ref 40–51)
HGB BLD-MCNC: 11.9 GM/DL (ref 13.5–17.5)
HGB UR QL STRIP: NEGATIVE
HYALINE CASTS #/AREA URNS LPF: ABNORMAL /[LPF]
IMM GRANULOCYTES # BLD AUTO: 0.01 10*3/UL
IMM GRANULOCYTES NFR BLD AUTO: 0 %
KETONES UR QL STRIP: NEGATIVE
LEUKOCYTE ESTERASE UR QL STRIP: NEGATIVE
LYMPHOCYTES # BLD AUTO: 1.78 X10(3)/MCL
LYMPHOCYTES NFR BLD AUTO: 55 % (ref 13–40)
MCH RBC QN AUTO: 23.9 PG (ref 26–34)
MCHC RBC AUTO-ENTMCNC: 30.5 GM/DL (ref 31–37)
MCV RBC AUTO: 78.3 FL (ref 80–100)
MONOCYTES # BLD AUTO: 0.32 X10(3)/MCL
MONOCYTES NFR BLD AUTO: 10 % (ref 4–12)
NEUTROPHILS # BLD AUTO: 1.12 X10(3)/MCL
NEUTROPHILS NFR BLD AUTO: 34 X10(3)/MCL
NITRITE UR QL STRIP: NEGATIVE
PH UR STRIP: 6 [PH] (ref 4.5–8)
PLATELET # BLD AUTO: 232 X10(3)/MCL (ref 130–400)
PMV BLD AUTO: 9.7 FL (ref 7.4–10.4)
POTASSIUM SERPL-SCNC: 3.4 MMOL/L (ref 3.5–5.1)
PROT SERPL-MCNC: 7.2 GM/DL (ref 6.4–8.2)
PROT UR QL STRIP: NEGATIVE
RBC # BLD AUTO: 4.98 X10(6)/MCL (ref 4.5–5.9)
RBC #/AREA URNS AUTO: ABNORMAL /[HPF]
SODIUM SERPL-SCNC: 141 MMOL/L (ref 136–145)
SP GR UR STRIP: 1.01 (ref 1–1.03)
SQUAMOUS #/AREA URNS LPF: ABNORMAL /[LPF]
UROBILINOGEN UR STRIP-ACNC: NORMAL
WBC # SPEC AUTO: 3.3 X10(3)/MCL (ref 4.5–11)
WBC #/AREA URNS AUTO: ABNORMAL /HPF

## 2018-10-29 ENCOUNTER — HISTORICAL (OUTPATIENT)
Dept: INFUSION THERAPY | Facility: HOSPITAL | Age: 38
End: 2018-10-29

## 2018-10-31 ENCOUNTER — HISTORICAL (OUTPATIENT)
Dept: INFUSION THERAPY | Facility: HOSPITAL | Age: 38
End: 2018-10-31

## 2018-11-12 ENCOUNTER — HISTORICAL (OUTPATIENT)
Dept: INFUSION THERAPY | Facility: HOSPITAL | Age: 38
End: 2018-11-12

## 2018-11-12 LAB
ABS NEUT (OLG): 1.59 X10(3)/MCL (ref 2.1–9.2)
ALBUMIN SERPL-MCNC: 3.5 GM/DL (ref 3.4–5)
ALBUMIN/GLOB SERPL: 1 RATIO (ref 1–2)
ALP SERPL-CCNC: 76 UNIT/L (ref 45–117)
ALT SERPL-CCNC: 28 UNIT/L (ref 12–78)
AST SERPL-CCNC: 27 UNIT/L (ref 15–37)
BASOPHILS # BLD AUTO: 0.02 X10(3)/MCL
BASOPHILS NFR BLD AUTO: 0 %
BILIRUB SERPL-MCNC: 0.3 MG/DL (ref 0.2–1)
BILIRUBIN DIRECT+TOT PNL SERPL-MCNC: <0.1 MG/DL
BILIRUBIN DIRECT+TOT PNL SERPL-MCNC: ABNORMAL MG/DL
BUN SERPL-MCNC: 15 MG/DL (ref 7–18)
CALCIUM SERPL-MCNC: 9.1 MG/DL (ref 8.5–10.1)
CEA SERPL-MCNC: 4.3 NG/ML
CHLORIDE SERPL-SCNC: 102 MMOL/L (ref 98–107)
CO2 SERPL-SCNC: 30 MMOL/L (ref 21–32)
CREAT SERPL-MCNC: 1.4 MG/DL (ref 0.6–1.3)
EOSINOPHIL # BLD AUTO: 0.04 X10(3)/MCL
EOSINOPHIL NFR BLD AUTO: 1 %
ERYTHROCYTE [DISTWIDTH] IN BLOOD BY AUTOMATED COUNT: 23.7 % (ref 11.5–14.5)
GLOBULIN SER-MCNC: 4.3 GM/ML (ref 2.3–3.5)
GLUCOSE SERPL-MCNC: 103 MG/DL (ref 74–106)
HCT VFR BLD AUTO: 38.8 % (ref 40–51)
HGB BLD-MCNC: 12.3 GM/DL (ref 13.5–17.5)
LYMPHOCYTES # BLD AUTO: 1.71 X10(3)/MCL
LYMPHOCYTES NFR BLD AUTO: 46 % (ref 13–40)
MCH RBC QN AUTO: 24.8 PG (ref 26–34)
MCHC RBC AUTO-ENTMCNC: 31.7 GM/DL (ref 31–37)
MCV RBC AUTO: 78.4 FL (ref 80–100)
MONOCYTES # BLD AUTO: 0.33 X10(3)/MCL
MONOCYTES NFR BLD AUTO: 9 % (ref 4–12)
NEUTROPHILS # BLD AUTO: 1.59 X10(3)/MCL
NEUTROPHILS NFR BLD AUTO: 43 X10(3)/MCL
PLATELET # BLD AUTO: 179 X10(3)/MCL (ref 130–400)
PMV BLD AUTO: 9.9 FL (ref 7.4–10.4)
POTASSIUM SERPL-SCNC: 3.4 MMOL/L (ref 3.5–5.1)
PROT SERPL-MCNC: 7.8 GM/DL (ref 6.4–8.2)
PROT UR STRIP-MCNC: 7.6 MG/DL
RBC # BLD AUTO: 4.95 X10(6)/MCL (ref 4.5–5.9)
SODIUM SERPL-SCNC: 138 MMOL/L (ref 136–145)
WBC # SPEC AUTO: 3.7 X10(3)/MCL (ref 4.5–11)

## 2018-11-14 ENCOUNTER — HISTORICAL (OUTPATIENT)
Dept: INFUSION THERAPY | Facility: HOSPITAL | Age: 38
End: 2018-11-14

## 2018-11-26 ENCOUNTER — HISTORICAL (OUTPATIENT)
Dept: ADMINISTRATIVE | Facility: HOSPITAL | Age: 38
End: 2018-11-26

## 2018-11-26 LAB
ABS NEUT (OLG): 1.72 X10(3)/MCL (ref 2.1–9.2)
ALBUMIN SERPL-MCNC: 3.6 GM/DL (ref 3.4–5)
ALBUMIN/GLOB SERPL: 1 RATIO (ref 1–2)
ALP SERPL-CCNC: 68 UNIT/L (ref 45–117)
ALT SERPL-CCNC: 23 UNIT/L (ref 12–78)
AST SERPL-CCNC: 24 UNIT/L (ref 15–37)
BASOPHILS # BLD AUTO: 0.02 X10(3)/MCL
BASOPHILS NFR BLD AUTO: 0 %
BILIRUB SERPL-MCNC: 0.3 MG/DL (ref 0.2–1)
BILIRUBIN DIRECT+TOT PNL SERPL-MCNC: 0.1 MG/DL
BILIRUBIN DIRECT+TOT PNL SERPL-MCNC: 0.2 MG/DL
BUN SERPL-MCNC: 21 MG/DL (ref 7–18)
CALCIUM SERPL-MCNC: 9.1 MG/DL (ref 8.5–10.1)
CEA SERPL-MCNC: 4.4 NG/ML
CHLORIDE SERPL-SCNC: 104 MMOL/L (ref 98–107)
CO2 SERPL-SCNC: 28 MMOL/L (ref 21–32)
CREAT SERPL-MCNC: 1.5 MG/DL (ref 0.6–1.3)
EOSINOPHIL # BLD AUTO: 0.04 10*3/UL
EOSINOPHIL NFR BLD AUTO: 1 %
ERYTHROCYTE [DISTWIDTH] IN BLOOD BY AUTOMATED COUNT: 21.5 % (ref 11.5–14.5)
GLOBULIN SER-MCNC: 4.3 GM/ML (ref 2.3–3.5)
GLUCOSE SERPL-MCNC: 98 MG/DL (ref 74–106)
HCT VFR BLD AUTO: 40 % (ref 40–51)
HGB BLD-MCNC: 12.3 GM/DL (ref 13.5–17.5)
IMM GRANULOCYTES # BLD AUTO: 0.01 10*3/UL
IMM GRANULOCYTES NFR BLD AUTO: 0 %
LYMPHOCYTES # BLD AUTO: 1.75 X10(3)/MCL
LYMPHOCYTES NFR BLD AUTO: 44 % (ref 13–40)
MCH RBC QN AUTO: 24.6 PG (ref 26–34)
MCHC RBC AUTO-ENTMCNC: 30.8 GM/DL (ref 31–37)
MCV RBC AUTO: 80.2 FL (ref 80–100)
MONOCYTES # BLD AUTO: 0.4 X10(3)/MCL
MONOCYTES NFR BLD AUTO: 10 % (ref 4–12)
NEUTROPHILS # BLD AUTO: 1.72 X10(3)/MCL
NEUTROPHILS NFR BLD AUTO: 44 X10(3)/MCL
PLATELET # BLD AUTO: 205 X10(3)/MCL (ref 130–400)
PMV BLD AUTO: 9.7 FL (ref 7.4–10.4)
POTASSIUM SERPL-SCNC: 3.6 MMOL/L (ref 3.5–5.1)
PROT SERPL-MCNC: 7.9 GM/DL (ref 6.4–8.2)
PROT UR STRIP-MCNC: 5.1 MG/DL
RBC # BLD AUTO: 4.99 X10(6)/MCL (ref 4.5–5.9)
SODIUM SERPL-SCNC: 141 MMOL/L (ref 136–145)
WBC # SPEC AUTO: 3.9 X10(3)/MCL (ref 4.5–11)

## 2018-11-27 ENCOUNTER — HISTORICAL (OUTPATIENT)
Dept: INFUSION THERAPY | Facility: HOSPITAL | Age: 38
End: 2018-11-27

## 2018-11-29 ENCOUNTER — HISTORICAL (OUTPATIENT)
Dept: INFUSION THERAPY | Facility: HOSPITAL | Age: 38
End: 2018-11-29

## 2018-12-05 ENCOUNTER — HISTORICAL (OUTPATIENT)
Dept: RADIOLOGY | Facility: HOSPITAL | Age: 38
End: 2018-12-05

## 2018-12-10 ENCOUNTER — HISTORICAL (OUTPATIENT)
Dept: ADMINISTRATIVE | Facility: HOSPITAL | Age: 38
End: 2018-12-10

## 2018-12-10 LAB
ABS NEUT (OLG): 0.96 X10(3)/MCL
ALBUMIN SERPL-MCNC: 3.4 GM/DL (ref 3.4–5)
ALBUMIN/GLOB SERPL: 1 RATIO (ref 1–2)
ALP SERPL-CCNC: 67 UNIT/L (ref 45–117)
ALT SERPL-CCNC: 22 UNIT/L (ref 12–78)
ANISOCYTOSIS BLD QL SMEAR: ABNORMAL
AST SERPL-CCNC: 21 UNIT/L (ref 15–37)
BASOPHILS NFR BLD MANUAL: 0 %
BILIRUB SERPL-MCNC: 0.3 MG/DL (ref 0.2–1)
BILIRUBIN DIRECT+TOT PNL SERPL-MCNC: 0.1 MG/DL
BILIRUBIN DIRECT+TOT PNL SERPL-MCNC: 0.2 MG/DL
BUN SERPL-MCNC: 9 MG/DL (ref 7–18)
CALCIUM SERPL-MCNC: 8.1 MG/DL (ref 8.5–10.1)
CEA SERPL-MCNC: 4.1 NG/ML
CHLORIDE SERPL-SCNC: 106 MMOL/L (ref 98–107)
CO2 SERPL-SCNC: 29 MMOL/L (ref 21–32)
CREAT SERPL-MCNC: 1.3 MG/DL (ref 0.6–1.3)
EOSINOPHIL NFR BLD MANUAL: 1 %
ERYTHROCYTE [DISTWIDTH] IN BLOOD BY AUTOMATED COUNT: 19.9 % (ref 11.5–14.5)
GLOBULIN SER-MCNC: 3.7 GM/ML (ref 2.3–3.5)
GLUCOSE SERPL-MCNC: 90 MG/DL (ref 74–106)
GRANULOCYTES NFR BLD MANUAL: 35 % (ref 43–75)
HCT VFR BLD AUTO: 37.2 % (ref 40–51)
HGB BLD-MCNC: 11.8 GM/DL (ref 13.5–17.5)
HYPOCHROMIA BLD QL SMEAR: ABNORMAL
LYMPHOCYTES NFR BLD MANUAL: 1 %
LYMPHOCYTES NFR BLD MANUAL: 52 % (ref 20.5–51.1)
MCH RBC QN AUTO: 25.3 PG (ref 26–34)
MCHC RBC AUTO-ENTMCNC: 31.7 GM/DL (ref 31–37)
MCV RBC AUTO: 79.8 FL (ref 80–100)
MICROCYTES BLD QL SMEAR: ABNORMAL
MONOCYTES NFR BLD MANUAL: 11 % (ref 2–9)
OVALOCYTES BLD QL SMEAR: ABNORMAL
PLATELET # BLD AUTO: 187 X10(3)/MCL (ref 130–400)
PLATELET # BLD EST: ADEQUATE 10*3/UL
PMV BLD AUTO: 9.5 FL (ref 7.4–10.4)
POIKILOCYTOSIS BLD QL SMEAR: ABNORMAL
POLYCHROMASIA BLD QL SMEAR: ABNORMAL
POTASSIUM SERPL-SCNC: 3.8 MMOL/L (ref 3.5–5.1)
PROT SERPL-MCNC: 7.1 GM/DL (ref 6.4–8.2)
PROT UR STRIP-MCNC: 8.2 MG/DL
RBC # BLD AUTO: 4.66 X10(6)/MCL (ref 4.5–5.9)
RBC MORPH BLD: ABNORMAL
SODIUM SERPL-SCNC: 141 MMOL/L (ref 136–145)
WBC # SPEC AUTO: 2.9 X10(3)/MCL (ref 4.5–11)

## 2018-12-11 ENCOUNTER — HISTORICAL (OUTPATIENT)
Dept: INFUSION THERAPY | Facility: HOSPITAL | Age: 38
End: 2018-12-11

## 2018-12-17 ENCOUNTER — HISTORICAL (OUTPATIENT)
Dept: INFUSION THERAPY | Facility: HOSPITAL | Age: 38
End: 2018-12-17

## 2018-12-17 LAB
ABS NEUT (OLG): 1.04 X10(3)/MCL (ref 2.1–9.2)
ALBUMIN SERPL-MCNC: 3.6 GM/DL (ref 3.4–5)
ALBUMIN/GLOB SERPL: 1 RATIO (ref 1–2)
ALP SERPL-CCNC: 70 UNIT/L (ref 45–117)
ALT SERPL-CCNC: 26 UNIT/L (ref 12–78)
AST SERPL-CCNC: 19 UNIT/L (ref 15–37)
BASOPHILS # BLD AUTO: 0.02 X10(3)/MCL
BASOPHILS NFR BLD AUTO: 1 %
BILIRUB SERPL-MCNC: 0.3 MG/DL (ref 0.2–1)
BILIRUBIN DIRECT+TOT PNL SERPL-MCNC: 0.1 MG/DL
BILIRUBIN DIRECT+TOT PNL SERPL-MCNC: 0.2 MG/DL
BUN SERPL-MCNC: 13 MG/DL (ref 7–18)
CALCIUM SERPL-MCNC: 8.5 MG/DL (ref 8.5–10.1)
CEA SERPL-MCNC: 3.2 NG/ML
CHLORIDE SERPL-SCNC: 99 MMOL/L (ref 98–107)
CO2 SERPL-SCNC: 32 MMOL/L (ref 21–32)
CREAT SERPL-MCNC: 1.4 MG/DL (ref 0.6–1.3)
EOSINOPHIL # BLD AUTO: 0.04 X10(3)/MCL
EOSINOPHIL NFR BLD AUTO: 1 %
ERYTHROCYTE [DISTWIDTH] IN BLOOD BY AUTOMATED COUNT: 19.5 % (ref 11.5–14.5)
GLOBULIN SER-MCNC: 4.2 GM/ML (ref 2.3–3.5)
GLUCOSE SERPL-MCNC: 104 MG/DL (ref 74–106)
HCT VFR BLD AUTO: 39.7 % (ref 40–51)
HGB BLD-MCNC: 12.5 GM/DL (ref 13.5–17.5)
IMM GRANULOCYTES # BLD AUTO: 0.01 10*3/UL
IMM GRANULOCYTES NFR BLD AUTO: 0 %
LYMPHOCYTES # BLD AUTO: 1.82 X10(3)/MCL
LYMPHOCYTES NFR BLD AUTO: 54 % (ref 13–40)
MCH RBC QN AUTO: 25.8 PG (ref 26–34)
MCHC RBC AUTO-ENTMCNC: 31.5 GM/DL (ref 31–37)
MCV RBC AUTO: 82 FL (ref 80–100)
MONOCYTES # BLD AUTO: 0.42 X10(3)/MCL
MONOCYTES NFR BLD AUTO: 12 % (ref 4–12)
NEUTROPHILS # BLD AUTO: 1.04 X10(3)/MCL
NEUTROPHILS NFR BLD AUTO: 31 X10(3)/MCL
PLATELET # BLD AUTO: 245 X10(3)/MCL (ref 130–400)
PMV BLD AUTO: 10.4 FL (ref 7.4–10.4)
POTASSIUM SERPL-SCNC: 3.8 MMOL/L (ref 3.5–5.1)
PROT SERPL-MCNC: 7.8 GM/DL (ref 6.4–8.2)
PROT UR STRIP-MCNC: <5 MG/DL
RBC # BLD AUTO: 4.84 X10(6)/MCL (ref 4.5–5.9)
SODIUM SERPL-SCNC: 136 MMOL/L (ref 136–145)
WBC # SPEC AUTO: 3.4 X10(3)/MCL (ref 4.5–11)

## 2018-12-19 ENCOUNTER — HISTORICAL (OUTPATIENT)
Dept: INFUSION THERAPY | Facility: HOSPITAL | Age: 38
End: 2018-12-19

## 2018-12-31 ENCOUNTER — HISTORICAL (OUTPATIENT)
Dept: INFUSION THERAPY | Facility: HOSPITAL | Age: 38
End: 2018-12-31

## 2018-12-31 LAB
ABS NEUT (OLG): 1.75 X10(3)/MCL (ref 2.1–9.2)
ALBUMIN SERPL-MCNC: 3.1 GM/DL (ref 3.4–5)
ALBUMIN/GLOB SERPL: 1 RATIO (ref 1–2)
ALP SERPL-CCNC: 64 UNIT/L (ref 45–117)
ALT SERPL-CCNC: 22 UNIT/L (ref 12–78)
AST SERPL-CCNC: 22 UNIT/L (ref 15–37)
BASOPHILS # BLD AUTO: 0.02 X10(3)/MCL
BASOPHILS NFR BLD AUTO: 0 %
BILIRUB SERPL-MCNC: 0.3 MG/DL (ref 0.2–1)
BILIRUBIN DIRECT+TOT PNL SERPL-MCNC: <0.1 MG/DL
BILIRUBIN DIRECT+TOT PNL SERPL-MCNC: ABNORMAL MG/DL
BUN SERPL-MCNC: 16 MG/DL (ref 7–18)
CALCIUM SERPL-MCNC: 8.6 MG/DL (ref 8.5–10.1)
CEA SERPL-MCNC: 4.1 NG/ML
CHLORIDE SERPL-SCNC: 102 MMOL/L (ref 98–107)
CO2 SERPL-SCNC: 29 MMOL/L (ref 21–32)
CREAT SERPL-MCNC: 1.4 MG/DL (ref 0.6–1.3)
EOSINOPHIL # BLD AUTO: 0.05 10*3/UL
EOSINOPHIL NFR BLD AUTO: 1 %
ERYTHROCYTE [DISTWIDTH] IN BLOOD BY AUTOMATED COUNT: 18.4 % (ref 11.5–14.5)
GLOBULIN SER-MCNC: 3.9 GM/ML (ref 2.3–3.5)
GLUCOSE SERPL-MCNC: 67 MG/DL (ref 74–106)
HCT VFR BLD AUTO: 37.7 % (ref 40–51)
HGB BLD-MCNC: 11.6 GM/DL (ref 13.5–17.5)
IMM GRANULOCYTES # BLD AUTO: 0.01 10*3/UL
IMM GRANULOCYTES NFR BLD AUTO: 0 %
LYMPHOCYTES # BLD AUTO: 2.16 X10(3)/MCL
LYMPHOCYTES NFR BLD AUTO: 48 % (ref 13–40)
MCH RBC QN AUTO: 25.6 PG (ref 26–34)
MCHC RBC AUTO-ENTMCNC: 30.8 GM/DL (ref 31–37)
MCV RBC AUTO: 83.2 FL (ref 80–100)
MONOCYTES # BLD AUTO: 0.47 X10(3)/MCL
MONOCYTES NFR BLD AUTO: 10 % (ref 4–12)
NEUTROPHILS # BLD AUTO: 1.75 X10(3)/MCL
NEUTROPHILS NFR BLD AUTO: 39 X10(3)/MCL
PLATELET # BLD AUTO: 187 X10(3)/MCL (ref 130–400)
PMV BLD AUTO: 9.6 FL (ref 7.4–10.4)
POTASSIUM SERPL-SCNC: 3.3 MMOL/L (ref 3.5–5.1)
PROT SERPL-MCNC: 7 GM/DL (ref 6.4–8.2)
PROT UR STRIP-MCNC: 6.5 MG/DL
RBC # BLD AUTO: 4.53 X10(6)/MCL (ref 4.5–5.9)
SODIUM SERPL-SCNC: 139 MMOL/L (ref 136–145)
WBC # SPEC AUTO: 4.5 X10(3)/MCL (ref 4.5–11)

## 2019-01-02 ENCOUNTER — HISTORICAL (OUTPATIENT)
Dept: INFUSION THERAPY | Facility: HOSPITAL | Age: 39
End: 2019-01-02

## 2019-01-10 ENCOUNTER — HISTORICAL (OUTPATIENT)
Dept: ADMINISTRATIVE | Facility: HOSPITAL | Age: 39
End: 2019-01-10

## 2019-01-10 LAB
ABS NEUT (OLG): 1.03 X10(3)/MCL (ref 2.1–9.2)
ALBUMIN SERPL-MCNC: 3.4 GM/DL (ref 3.4–5)
ALBUMIN/GLOB SERPL: 1 RATIO (ref 1–2)
ALP SERPL-CCNC: 66 UNIT/L (ref 45–117)
ALT SERPL-CCNC: 32 UNIT/L (ref 12–78)
AST SERPL-CCNC: 22 UNIT/L (ref 15–37)
BASOPHILS # BLD AUTO: 0.02 X10(3)/MCL
BASOPHILS NFR BLD AUTO: 1 %
BILIRUB SERPL-MCNC: 0.3 MG/DL (ref 0.2–1)
BILIRUBIN DIRECT+TOT PNL SERPL-MCNC: 0.1 MG/DL
BILIRUBIN DIRECT+TOT PNL SERPL-MCNC: 0.2 MG/DL
BUN SERPL-MCNC: 10 MG/DL (ref 7–18)
CALCIUM SERPL-MCNC: 8.4 MG/DL (ref 8.5–10.1)
CHLORIDE SERPL-SCNC: 105 MMOL/L (ref 98–107)
CO2 SERPL-SCNC: 29 MMOL/L (ref 21–32)
CREAT SERPL-MCNC: 1.3 MG/DL (ref 0.6–1.3)
EOSINOPHIL # BLD AUTO: 0.03 10*3/UL
EOSINOPHIL NFR BLD AUTO: 1 %
ERYTHROCYTE [DISTWIDTH] IN BLOOD BY AUTOMATED COUNT: 18.5 % (ref 11.5–14.5)
GLOBULIN SER-MCNC: 4.3 GM/ML (ref 2.3–3.5)
GLUCOSE SERPL-MCNC: 88 MG/DL (ref 74–106)
HCT VFR BLD AUTO: 38.1 % (ref 40–51)
HGB BLD-MCNC: 11.8 GM/DL (ref 13.5–17.5)
IMM GRANULOCYTES # BLD AUTO: 0.01 10*3/UL
IMM GRANULOCYTES NFR BLD AUTO: 0 %
LYMPHOCYTES # BLD AUTO: 1.95 X10(3)/MCL
LYMPHOCYTES NFR BLD AUTO: 58 % (ref 13–40)
MCH RBC QN AUTO: 25.8 PG (ref 26–34)
MCHC RBC AUTO-ENTMCNC: 31 GM/DL (ref 31–37)
MCV RBC AUTO: 83.4 FL (ref 80–100)
MONOCYTES # BLD AUTO: 0.33 X10(3)/MCL
MONOCYTES NFR BLD AUTO: 10 % (ref 4–12)
NEUTROPHILS # BLD AUTO: 1.03 X10(3)/MCL
NEUTROPHILS NFR BLD AUTO: 30 X10(3)/MCL
PLATELET # BLD AUTO: 239 X10(3)/MCL (ref 130–400)
PMV BLD AUTO: 9.8 FL (ref 7.4–10.4)
POTASSIUM SERPL-SCNC: 3.6 MMOL/L (ref 3.5–5.1)
PROT SERPL-MCNC: 7.7 GM/DL (ref 6.4–8.2)
PROT UR STRIP-MCNC: <5 MG/DL
RBC # BLD AUTO: 4.57 X10(6)/MCL (ref 4.5–5.9)
SODIUM SERPL-SCNC: 140 MMOL/L (ref 136–145)
WBC # SPEC AUTO: 3.4 X10(3)/MCL (ref 4.5–11)

## 2019-01-14 ENCOUNTER — HISTORICAL (OUTPATIENT)
Dept: INFUSION THERAPY | Facility: HOSPITAL | Age: 39
End: 2019-01-14

## 2019-01-14 LAB
ABS NEUT (OLG): 1.91 X10(3)/MCL (ref 2.1–9.2)
BASOPHILS # BLD AUTO: 0.01 X10(3)/MCL
BASOPHILS NFR BLD AUTO: 0 %
EOSINOPHIL # BLD AUTO: 0.04 10*3/UL
EOSINOPHIL NFR BLD AUTO: 1 %
ERYTHROCYTE [DISTWIDTH] IN BLOOD BY AUTOMATED COUNT: 18.6 % (ref 11.5–14.5)
HCT VFR BLD AUTO: 38.4 % (ref 40–51)
HGB BLD-MCNC: 12 GM/DL (ref 13.5–17.5)
IMM GRANULOCYTES # BLD AUTO: 0.01 10*3/UL
IMM GRANULOCYTES NFR BLD AUTO: 0 %
LYMPHOCYTES # BLD AUTO: 2.03 X10(3)/MCL
LYMPHOCYTES NFR BLD AUTO: 44 % (ref 13–40)
MCH RBC QN AUTO: 26 PG (ref 26–34)
MCHC RBC AUTO-ENTMCNC: 31.3 GM/DL (ref 31–37)
MCV RBC AUTO: 83.1 FL (ref 80–100)
MONOCYTES # BLD AUTO: 0.57 X10(3)/MCL
MONOCYTES NFR BLD AUTO: 12 % (ref 4–12)
NEUTROPHILS # BLD AUTO: 1.91 X10(3)/MCL
NEUTROPHILS NFR BLD AUTO: 42 X10(3)/MCL
PLATELET # BLD AUTO: 185 X10(3)/MCL (ref 130–400)
PMV BLD AUTO: 9.9 FL (ref 7.4–10.4)
RBC # BLD AUTO: 4.62 X10(6)/MCL (ref 4.5–5.9)
WBC # SPEC AUTO: 4.6 X10(3)/MCL (ref 4.5–11)

## 2019-01-16 ENCOUNTER — HISTORICAL (OUTPATIENT)
Dept: INFUSION THERAPY | Facility: HOSPITAL | Age: 39
End: 2019-01-16

## 2019-01-24 ENCOUNTER — HISTORICAL (OUTPATIENT)
Dept: ADMINISTRATIVE | Facility: HOSPITAL | Age: 39
End: 2019-01-24

## 2019-01-24 LAB
ALBUMIN SERPL-MCNC: 3.8 GM/DL (ref 3.4–5)
ALBUMIN/GLOB SERPL: 0.88 RATIO (ref 1.1–2)
ALP SERPL-CCNC: 73 UNIT/L (ref 45–117)
ALT SERPL-CCNC: 32 UNIT/L (ref 12–78)
ANISOCYTOSIS BLD QL SMEAR: ABNORMAL
AST SERPL-CCNC: 25 UNIT/L (ref 15–37)
BASOPHILS NFR BLD MANUAL: 0 %
BILIRUB SERPL-MCNC: 0.4 MG/DL (ref 0.2–1)
BILIRUBIN DIRECT+TOT PNL SERPL-MCNC: 0.1 MG/DL
BILIRUBIN DIRECT+TOT PNL SERPL-MCNC: 0.3 MG/DL
BUN SERPL-MCNC: 13 MG/DL (ref 7–18)
CALCIUM SERPL-MCNC: 8.7 MG/DL (ref 8.5–10.1)
CEA SERPL-MCNC: 3.7 NG/ML
CHLORIDE SERPL-SCNC: 101 MMOL/L (ref 98–107)
CO2 SERPL-SCNC: 31 MMOL/L (ref 21–32)
CREAT SERPL-MCNC: 1.4 MG/DL (ref 0.6–1.3)
EOSINOPHIL NFR BLD MANUAL: 0 %
ERYTHROCYTE [DISTWIDTH] IN BLOOD BY AUTOMATED COUNT: 18.6 % (ref 11.5–14.5)
GLOBULIN SER-MCNC: 4.3 GM/ML (ref 2.3–3.5)
GLUCOSE SERPL-MCNC: 92 MG/DL (ref 74–106)
GRANULOCYTES NFR BLD MANUAL: 24 % (ref 43–75)
HCT VFR BLD AUTO: 39.1 % (ref 40–51)
HGB BLD-MCNC: 12.5 GM/DL (ref 13.5–17.5)
HYPOCHROMIA BLD QL SMEAR: ABNORMAL
LYMPHOCYTES NFR BLD MANUAL: 2 %
LYMPHOCYTES NFR BLD MANUAL: 68 % (ref 20.5–51.1)
MCH RBC QN AUTO: 26.7 PG (ref 26–34)
MCHC RBC AUTO-ENTMCNC: 32 GM/DL (ref 31–37)
MCV RBC AUTO: 83.4 FL (ref 80–100)
MONOCYTES NFR BLD MANUAL: 6 % (ref 2–9)
OVALOCYTES BLD QL SMEAR: ABNORMAL
PLATELET # BLD AUTO: 256 X10(3)/MCL (ref 130–400)
PLATELET # BLD EST: ADEQUATE 10*3/UL
PMV BLD AUTO: 9.9 FL (ref 7.4–10.4)
POTASSIUM SERPL-SCNC: 3.5 MMOL/L (ref 3.5–5.1)
PROT SERPL-MCNC: 8.1 GM/DL (ref 6.4–8.2)
PROT UR STRIP-MCNC: <5 MG/DL
RBC # BLD AUTO: 4.69 X10(6)/MCL (ref 4.5–5.9)
RBC MORPH BLD: ABNORMAL
SODIUM SERPL-SCNC: 137 MMOL/L (ref 136–145)
WBC # SPEC AUTO: 3.2 X10(3)/MCL (ref 4.5–11)

## 2019-01-28 ENCOUNTER — HISTORICAL (OUTPATIENT)
Dept: INFUSION THERAPY | Facility: HOSPITAL | Age: 39
End: 2019-01-28

## 2019-01-28 LAB
ABS NEUT (OLG): 2.88 X10(3)/MCL (ref 2.1–9.2)
BASOPHILS # BLD AUTO: 0.02 X10(3)/MCL
BASOPHILS NFR BLD AUTO: 0 %
CEA SERPL-MCNC: 3.7 NG/ML
EOSINOPHIL # BLD AUTO: 0.02 X10(3)/MCL
EOSINOPHIL NFR BLD AUTO: 0 %
ERYTHROCYTE [DISTWIDTH] IN BLOOD BY AUTOMATED COUNT: 18.7 % (ref 11.5–14.5)
HCT VFR BLD AUTO: 37.7 % (ref 40–51)
HGB BLD-MCNC: 11.9 GM/DL (ref 13.5–17.5)
IMM GRANULOCYTES # BLD AUTO: 0.02 10*3/UL
IMM GRANULOCYTES NFR BLD AUTO: 0 %
LYMPHOCYTES # BLD AUTO: 1.72 X10(3)/MCL
LYMPHOCYTES NFR BLD AUTO: 33 % (ref 13–40)
MCH RBC QN AUTO: 26.6 PG (ref 26–34)
MCHC RBC AUTO-ENTMCNC: 31.6 GM/DL (ref 31–37)
MCV RBC AUTO: 84.3 FL (ref 80–100)
MONOCYTES # BLD AUTO: 0.52 X10(3)/MCL
MONOCYTES NFR BLD AUTO: 10 % (ref 4–12)
NEUTROPHILS # BLD AUTO: 2.88 X10(3)/MCL
NEUTROPHILS NFR BLD AUTO: 56 X10(3)/MCL
PLATELET # BLD AUTO: 179 X10(3)/MCL (ref 130–400)
PMV BLD AUTO: 10.2 FL (ref 7.4–10.4)
RBC # BLD AUTO: 4.47 X10(6)/MCL (ref 4.5–5.9)
WBC # SPEC AUTO: 5.2 X10(3)/MCL (ref 4.5–11)

## 2019-01-29 ENCOUNTER — HISTORICAL (OUTPATIENT)
Dept: INFUSION THERAPY | Facility: HOSPITAL | Age: 39
End: 2019-01-29

## 2019-01-30 ENCOUNTER — HISTORICAL (OUTPATIENT)
Dept: INFUSION THERAPY | Facility: HOSPITAL | Age: 39
End: 2019-01-30

## 2019-02-11 ENCOUNTER — HISTORICAL (OUTPATIENT)
Dept: INFUSION THERAPY | Facility: HOSPITAL | Age: 39
End: 2019-02-11

## 2019-02-11 LAB
ABS NEUT (OLG): 2.12 X10(3)/MCL (ref 2.1–9.2)
ALBUMIN SERPL-MCNC: 3.3 GM/DL (ref 3.4–5)
ALBUMIN/GLOB SERPL: 0.9 RATIO (ref 1.1–2)
ALP SERPL-CCNC: 57 UNIT/L (ref 45–117)
ALT SERPL-CCNC: 33 UNIT/L (ref 12–78)
AST SERPL-CCNC: 35 UNIT/L (ref 15–37)
BASOPHILS # BLD AUTO: 0.02 X10(3)/MCL
BASOPHILS NFR BLD AUTO: 0 %
BILIRUB SERPL-MCNC: 0.5 MG/DL (ref 0.2–1)
BILIRUBIN DIRECT+TOT PNL SERPL-MCNC: 0.1 MG/DL
BILIRUBIN DIRECT+TOT PNL SERPL-MCNC: 0.4 MG/DL
BUN SERPL-MCNC: 11 MG/DL (ref 7–18)
CALCIUM SERPL-MCNC: 8.6 MG/DL (ref 8.5–10.1)
CHLORIDE SERPL-SCNC: 107 MMOL/L (ref 98–107)
CO2 SERPL-SCNC: 31 MMOL/L (ref 21–32)
CREAT SERPL-MCNC: 1.1 MG/DL (ref 0.6–1.3)
EOSINOPHIL # BLD AUTO: 0.04 10*3/UL
EOSINOPHIL NFR BLD AUTO: 1 %
ERYTHROCYTE [DISTWIDTH] IN BLOOD BY AUTOMATED COUNT: 18.5 % (ref 11.5–14.5)
GLOBULIN SER-MCNC: 3.6 GM/ML (ref 2.3–3.5)
GLUCOSE SERPL-MCNC: 94 MG/DL (ref 74–106)
HCT VFR BLD AUTO: 36.3 % (ref 40–51)
HGB BLD-MCNC: 11.2 GM/DL (ref 13.5–17.5)
IMM GRANULOCYTES # BLD AUTO: 0.01 10*3/UL
IMM GRANULOCYTES NFR BLD AUTO: 0 %
LYMPHOCYTES # BLD AUTO: 1.44 X10(3)/MCL
LYMPHOCYTES NFR BLD AUTO: 36 % (ref 13–40)
MCH RBC QN AUTO: 26.3 PG (ref 26–34)
MCHC RBC AUTO-ENTMCNC: 30.9 GM/DL (ref 31–37)
MCV RBC AUTO: 85.2 FL (ref 80–100)
MONOCYTES # BLD AUTO: 0.36 X10(3)/MCL
MONOCYTES NFR BLD AUTO: 9 % (ref 4–12)
NEUTROPHILS # BLD AUTO: 2.12 X10(3)/MCL
NEUTROPHILS NFR BLD AUTO: 53 X10(3)/MCL
PLATELET # BLD AUTO: 147 X10(3)/MCL (ref 130–400)
PMV BLD AUTO: 9.3 FL (ref 7.4–10.4)
POTASSIUM SERPL-SCNC: 3.6 MMOL/L (ref 3.5–5.1)
PROT SERPL-MCNC: 6.9 GM/DL (ref 6.4–8.2)
PROT UR STRIP-MCNC: 13 MG/DL
RBC # BLD AUTO: 4.26 X10(6)/MCL (ref 4.5–5.9)
SODIUM SERPL-SCNC: 141 MMOL/L (ref 136–145)
WBC # SPEC AUTO: 4 X10(3)/MCL (ref 4.5–11)

## 2019-02-13 ENCOUNTER — HISTORICAL (OUTPATIENT)
Dept: INFUSION THERAPY | Facility: HOSPITAL | Age: 39
End: 2019-02-13

## 2019-02-21 ENCOUNTER — HISTORICAL (OUTPATIENT)
Dept: ADMINISTRATIVE | Facility: HOSPITAL | Age: 39
End: 2019-02-21

## 2019-02-21 LAB
ABS NEUT (OLG): 1.16 X10(3)/MCL (ref 2.1–9.2)
ALBUMIN SERPL-MCNC: 3.6 GM/DL (ref 3.4–5)
ALBUMIN/GLOB SERPL: 0.9 RATIO (ref 1.1–2)
ALP SERPL-CCNC: 68 UNIT/L (ref 45–117)
ALT SERPL-CCNC: 35 UNIT/L (ref 12–78)
AST SERPL-CCNC: 26 UNIT/L (ref 15–37)
BASOPHILS # BLD AUTO: 0.02 X10(3)/MCL
BASOPHILS NFR BLD AUTO: 1 %
BILIRUB SERPL-MCNC: 0.3 MG/DL (ref 0.2–1)
BILIRUBIN DIRECT+TOT PNL SERPL-MCNC: <0.1 MG/DL
BILIRUBIN DIRECT+TOT PNL SERPL-MCNC: ABNORMAL MG/DL
BUN SERPL-MCNC: 9 MG/DL (ref 7–18)
CALCIUM SERPL-MCNC: 8.7 MG/DL (ref 8.5–10.1)
CEA SERPL-MCNC: 4.7 NG/ML
CHLORIDE SERPL-SCNC: 108 MMOL/L (ref 98–107)
CO2 SERPL-SCNC: 28 MMOL/L (ref 21–32)
CREAT SERPL-MCNC: 1.2 MG/DL (ref 0.6–1.3)
EOSINOPHIL # BLD AUTO: 0.03 10*3/UL
EOSINOPHIL NFR BLD AUTO: 1 %
ERYTHROCYTE [DISTWIDTH] IN BLOOD BY AUTOMATED COUNT: 18.7 % (ref 11.5–14.5)
GLOBULIN SER-MCNC: 4.1 GM/ML (ref 2.3–3.5)
GLUCOSE SERPL-MCNC: 85 MG/DL (ref 74–106)
HCT VFR BLD AUTO: 38.8 % (ref 40–51)
HGB BLD-MCNC: 11.9 GM/DL (ref 13.5–17.5)
IMM GRANULOCYTES # BLD AUTO: 0.01 10*3/UL
IMM GRANULOCYTES NFR BLD AUTO: 0 %
LYMPHOCYTES # BLD AUTO: 1.81 X10(3)/MCL
LYMPHOCYTES NFR BLD AUTO: 52 % (ref 13–40)
MCH RBC QN AUTO: 26.7 PG (ref 26–34)
MCHC RBC AUTO-ENTMCNC: 30.7 GM/DL (ref 31–37)
MCV RBC AUTO: 87.2 FL (ref 80–100)
MONOCYTES # BLD AUTO: 0.44 X10(3)/MCL
MONOCYTES NFR BLD AUTO: 13 % (ref 4–12)
NEUTROPHILS # BLD AUTO: 1.16 X10(3)/MCL
NEUTROPHILS NFR BLD AUTO: 33 X10(3)/MCL
PLATELET # BLD AUTO: 207 X10(3)/MCL (ref 130–400)
PMV BLD AUTO: 9.7 FL (ref 7.4–10.4)
POTASSIUM SERPL-SCNC: 3.9 MMOL/L (ref 3.5–5.1)
PROT SERPL-MCNC: 7.7 GM/DL (ref 6.4–8.2)
PROT UR STRIP-MCNC: 9.4 MG/DL
RBC # BLD AUTO: 4.45 X10(6)/MCL (ref 4.5–5.9)
SODIUM SERPL-SCNC: 140 MMOL/L (ref 136–145)
WBC # SPEC AUTO: 3.5 X10(3)/MCL (ref 4.5–11)

## 2019-02-25 ENCOUNTER — HISTORICAL (OUTPATIENT)
Dept: INFUSION THERAPY | Facility: HOSPITAL | Age: 39
End: 2019-02-25

## 2019-02-25 LAB
ABS NEUT (OLG): 1.69 X10(3)/MCL (ref 2.1–9.2)
BASOPHILS # BLD AUTO: 0.02 X10(3)/MCL
BASOPHILS NFR BLD AUTO: 0 %
CEA SERPL-MCNC: 4.6 NG/ML
EOSINOPHIL # BLD AUTO: 0.04 10*3/UL
EOSINOPHIL NFR BLD AUTO: 1 %
ERYTHROCYTE [DISTWIDTH] IN BLOOD BY AUTOMATED COUNT: 18.5 % (ref 11.5–14.5)
HCT VFR BLD AUTO: 38.1 % (ref 40–51)
HGB BLD-MCNC: 12.1 GM/DL (ref 13.5–17.5)
IMM GRANULOCYTES # BLD AUTO: 0.01 10*3/UL
IMM GRANULOCYTES NFR BLD AUTO: 0 %
LYMPHOCYTES # BLD AUTO: 1.73 X10(3)/MCL
LYMPHOCYTES NFR BLD AUTO: 44 % (ref 13–40)
MCH RBC QN AUTO: 27.3 PG (ref 26–34)
MCHC RBC AUTO-ENTMCNC: 31.8 GM/DL (ref 31–37)
MCV RBC AUTO: 85.8 FL (ref 80–100)
MONOCYTES # BLD AUTO: 0.48 X10(3)/MCL
MONOCYTES NFR BLD AUTO: 12 % (ref 4–12)
NEUTROPHILS # BLD AUTO: 1.69 X10(3)/MCL
NEUTROPHILS NFR BLD AUTO: 42 X10(3)/MCL
PLATELET # BLD AUTO: 183 X10(3)/MCL (ref 130–400)
PMV BLD AUTO: 10.4 FL (ref 7.4–10.4)
RBC # BLD AUTO: 4.44 X10(6)/MCL (ref 4.5–5.9)
WBC # SPEC AUTO: 4 X10(3)/MCL (ref 4.5–11)

## 2019-02-26 ENCOUNTER — HISTORICAL (OUTPATIENT)
Dept: INFUSION THERAPY | Facility: HOSPITAL | Age: 39
End: 2019-02-26

## 2019-02-28 ENCOUNTER — HISTORICAL (OUTPATIENT)
Dept: INFUSION THERAPY | Facility: HOSPITAL | Age: 39
End: 2019-02-28

## 2019-03-11 ENCOUNTER — HISTORICAL (OUTPATIENT)
Dept: RADIOLOGY | Facility: HOSPITAL | Age: 39
End: 2019-03-11

## 2019-03-12 ENCOUNTER — HISTORICAL (OUTPATIENT)
Dept: INFUSION THERAPY | Facility: HOSPITAL | Age: 39
End: 2019-03-12

## 2019-03-12 LAB
ABS NEUT (OLG): 1.86 X10(3)/MCL (ref 2.1–9.2)
ALBUMIN SERPL-MCNC: 3.4 GM/DL (ref 3.4–5)
ALBUMIN/GLOB SERPL: 0.9 RATIO (ref 1.1–2)
ALP SERPL-CCNC: 66 UNIT/L (ref 45–117)
ALT SERPL-CCNC: 32 UNIT/L (ref 12–78)
AST SERPL-CCNC: 30 UNIT/L (ref 15–37)
BASOPHILS # BLD AUTO: 0.02 X10(3)/MCL
BASOPHILS NFR BLD AUTO: 0 %
BILIRUB SERPL-MCNC: 0.3 MG/DL (ref 0.2–1)
BILIRUBIN DIRECT+TOT PNL SERPL-MCNC: <0.1 MG/DL
BILIRUBIN DIRECT+TOT PNL SERPL-MCNC: ABNORMAL MG/DL
BUN SERPL-MCNC: 10 MG/DL (ref 7–18)
CALCIUM SERPL-MCNC: 8.7 MG/DL (ref 8.5–10.1)
CHLORIDE SERPL-SCNC: 108 MMOL/L (ref 98–107)
CO2 SERPL-SCNC: 29 MMOL/L (ref 21–32)
CREAT SERPL-MCNC: 1.3 MG/DL (ref 0.6–1.3)
EOSINOPHIL # BLD AUTO: 0.04 X10(3)/MCL
EOSINOPHIL NFR BLD AUTO: 1 %
ERYTHROCYTE [DISTWIDTH] IN BLOOD BY AUTOMATED COUNT: 17.9 % (ref 11.5–14.5)
GLOBULIN SER-MCNC: 3.6 GM/ML (ref 2.3–3.5)
GLUCOSE SERPL-MCNC: 60 MG/DL (ref 74–106)
HCT VFR BLD AUTO: 37.3 % (ref 40–51)
HGB BLD-MCNC: 11.8 GM/DL (ref 13.5–17.5)
IMM GRANULOCYTES # BLD AUTO: 0.01 10*3/UL
IMM GRANULOCYTES NFR BLD AUTO: 0 %
LYMPHOCYTES # BLD AUTO: 1.75 X10(3)/MCL
LYMPHOCYTES NFR BLD AUTO: 42 % (ref 13–40)
MCH RBC QN AUTO: 27.6 PG (ref 26–34)
MCHC RBC AUTO-ENTMCNC: 31.6 GM/DL (ref 31–37)
MCV RBC AUTO: 87.4 FL (ref 80–100)
MONOCYTES # BLD AUTO: 0.52 X10(3)/MCL
MONOCYTES NFR BLD AUTO: 12 % (ref 4–12)
NEUTROPHILS # BLD AUTO: 1.86 X10(3)/MCL
NEUTROPHILS NFR BLD AUTO: 44 X10(3)/MCL
PLATELET # BLD AUTO: 158 X10(3)/MCL (ref 130–400)
PMV BLD AUTO: 10.3 FL (ref 7.4–10.4)
POTASSIUM SERPL-SCNC: 4 MMOL/L (ref 3.5–5.1)
PROT SERPL-MCNC: 7 GM/DL (ref 6.4–8.2)
PROT UR STRIP-MCNC: 8.9 MG/DL
RBC # BLD AUTO: 4.27 X10(6)/MCL (ref 4.5–5.9)
SODIUM SERPL-SCNC: 142 MMOL/L (ref 136–145)
WBC # SPEC AUTO: 4.2 X10(3)/MCL (ref 4.5–11)

## 2019-03-14 ENCOUNTER — HISTORICAL (OUTPATIENT)
Dept: INFUSION THERAPY | Facility: HOSPITAL | Age: 39
End: 2019-03-14

## 2019-03-20 ENCOUNTER — HISTORICAL (OUTPATIENT)
Dept: ADMINISTRATIVE | Facility: HOSPITAL | Age: 39
End: 2019-03-20

## 2019-03-20 LAB
ABS NEUT (OLG): 1.06 X10(3)/MCL (ref 2.1–9.2)
ALBUMIN SERPL-MCNC: 3.6 GM/DL (ref 3.4–5)
ALBUMIN/GLOB SERPL: 0.9 RATIO (ref 1.1–2)
ALP SERPL-CCNC: 67 UNIT/L (ref 45–117)
ALT SERPL-CCNC: 47 UNIT/L (ref 12–78)
AST SERPL-CCNC: 31 UNIT/L (ref 15–37)
BASOPHILS # BLD AUTO: 0.02 X10(3)/MCL
BASOPHILS NFR BLD AUTO: 1 %
BILIRUB SERPL-MCNC: 0.4 MG/DL (ref 0.2–1)
BILIRUBIN DIRECT+TOT PNL SERPL-MCNC: <0.1 MG/DL
BILIRUBIN DIRECT+TOT PNL SERPL-MCNC: >0.3 MG/DL
BUN SERPL-MCNC: 6 MG/DL (ref 7–18)
CALCIUM SERPL-MCNC: 8.7 MG/DL (ref 8.5–10.1)
CEA SERPL-MCNC: 3.6 NG/ML
CHLORIDE SERPL-SCNC: 107 MMOL/L (ref 98–107)
CO2 SERPL-SCNC: 29 MMOL/L (ref 21–32)
CREAT SERPL-MCNC: 1.3 MG/DL (ref 0.6–1.3)
EOSINOPHIL # BLD AUTO: 0.04 X10(3)/MCL
EOSINOPHIL NFR BLD AUTO: 1 %
ERYTHROCYTE [DISTWIDTH] IN BLOOD BY AUTOMATED COUNT: 17.1 % (ref 11.5–14.5)
FERRITIN SERPL-MCNC: 95 NG/ML (ref 26–388)
GLOBULIN SER-MCNC: 3.9 GM/ML (ref 2.3–3.5)
GLUCOSE SERPL-MCNC: 82 MG/DL (ref 74–106)
HCT VFR BLD AUTO: 39.3 % (ref 40–51)
HGB BLD-MCNC: 12.4 GM/DL (ref 13.5–17.5)
IMM GRANULOCYTES # BLD AUTO: 0.03 10*3/UL
IMM GRANULOCYTES NFR BLD AUTO: 1 %
IRON SATN MFR SERPL: 9.6 % (ref 15–50)
IRON SERPL-MCNC: 35 MCG/DL (ref 65–175)
LYMPHOCYTES # BLD AUTO: 1.93 X10(3)/MCL
LYMPHOCYTES NFR BLD AUTO: 57 % (ref 13–40)
MCH RBC QN AUTO: 27.4 PG (ref 26–34)
MCHC RBC AUTO-ENTMCNC: 31.6 GM/DL (ref 31–37)
MCV RBC AUTO: 86.8 FL (ref 80–100)
MONOCYTES # BLD AUTO: 0.33 X10(3)/MCL
MONOCYTES NFR BLD AUTO: 10 % (ref 4–12)
NEUTROPHILS # BLD AUTO: 1.06 X10(3)/MCL
NEUTROPHILS NFR BLD AUTO: 31 X10(3)/MCL
PLATELET # BLD AUTO: 183 X10(3)/MCL (ref 130–400)
PMV BLD AUTO: 9.6 FL (ref 7.4–10.4)
POTASSIUM SERPL-SCNC: 3.6 MMOL/L (ref 3.5–5.1)
PROT SERPL-MCNC: 7.4 GM/DL
PROT SERPL-MCNC: 7.5 GM/DL (ref 6.4–8.2)
PROT UR STRIP-MCNC: 10.1 MG/DL
RBC # BLD AUTO: 4.53 X10(6)/MCL (ref 4.5–5.9)
SODIUM SERPL-SCNC: 140 MMOL/L (ref 136–145)
TIBC SERPL-MCNC: 364 MCG/DL (ref 250–450)
TRANSFERRIN SERPL-MCNC: 263 MG/DL (ref 200–360)
WBC # SPEC AUTO: 3.4 X10(3)/MCL (ref 4.5–11)

## 2019-03-26 ENCOUNTER — HISTORICAL (OUTPATIENT)
Dept: INFUSION THERAPY | Facility: HOSPITAL | Age: 39
End: 2019-03-26

## 2019-03-26 LAB
ABS NEUT (OLG): 1.34 X10(3)/MCL (ref 2.1–9.2)
ALBUMIN SERPL-MCNC: 3.6 GM/DL (ref 3.4–5)
ALBUMIN/GLOB SERPL: 0.9 RATIO (ref 1.1–2)
ALP SERPL-CCNC: 63 UNIT/L (ref 45–117)
ALT SERPL-CCNC: 29 UNIT/L (ref 12–78)
AST SERPL-CCNC: 29 UNIT/L (ref 15–37)
BASOPHILS # BLD AUTO: 0.02 X10(3)/MCL
BASOPHILS NFR BLD AUTO: 1 %
BILIRUB SERPL-MCNC: 0.4 MG/DL (ref 0.2–1)
BILIRUBIN DIRECT+TOT PNL SERPL-MCNC: 0.1 MG/DL
BILIRUBIN DIRECT+TOT PNL SERPL-MCNC: 0.3 MG/DL
BUN SERPL-MCNC: 10 MG/DL (ref 7–18)
CALCIUM SERPL-MCNC: 9 MG/DL (ref 8.5–10.1)
CHLORIDE SERPL-SCNC: 107 MMOL/L (ref 98–107)
CO2 SERPL-SCNC: 29 MMOL/L (ref 21–32)
CREAT SERPL-MCNC: 1.4 MG/DL (ref 0.6–1.3)
EOSINOPHIL # BLD AUTO: 0.03 10*3/UL
EOSINOPHIL NFR BLD AUTO: 1 %
ERYTHROCYTE [DISTWIDTH] IN BLOOD BY AUTOMATED COUNT: 16.6 % (ref 11.5–14.5)
GLOBULIN SER-MCNC: 3.8 GM/ML (ref 2.3–3.5)
GLUCOSE SERPL-MCNC: 94 MG/DL (ref 74–106)
HCT VFR BLD AUTO: 39.3 % (ref 40–51)
HGB BLD-MCNC: 12.3 GM/DL (ref 13.5–17.5)
IMM GRANULOCYTES # BLD AUTO: 0.01 10*3/UL
IMM GRANULOCYTES NFR BLD AUTO: 0 %
LYMPHOCYTES # BLD AUTO: 1.57 X10(3)/MCL
LYMPHOCYTES NFR BLD AUTO: 47 % (ref 13–40)
MCH RBC QN AUTO: 27 PG (ref 26–34)
MCHC RBC AUTO-ENTMCNC: 31.3 GM/DL (ref 31–37)
MCV RBC AUTO: 86.4 FL (ref 80–100)
MONOCYTES # BLD AUTO: 0.34 X10(3)/MCL
MONOCYTES NFR BLD AUTO: 10 % (ref 4–12)
NEUTROPHILS # BLD AUTO: 1.34 X10(3)/MCL
NEUTROPHILS NFR BLD AUTO: 40 X10(3)/MCL
PLATELET # BLD AUTO: 149 X10(3)/MCL (ref 130–400)
PMV BLD AUTO: 10.8 FL (ref 7.4–10.4)
POTASSIUM SERPL-SCNC: 3.6 MMOL/L (ref 3.5–5.1)
PROT SERPL-MCNC: 7.4 GM/DL (ref 6.4–8.2)
PROT UR STRIP-MCNC: 6.4 MG/DL
RBC # BLD AUTO: 4.55 X10(6)/MCL (ref 4.5–5.9)
SODIUM SERPL-SCNC: 140 MMOL/L (ref 136–145)
WBC # SPEC AUTO: 3.3 X10(3)/MCL (ref 4.5–11)

## 2019-04-08 ENCOUNTER — HISTORICAL (OUTPATIENT)
Dept: ADMINISTRATIVE | Facility: HOSPITAL | Age: 39
End: 2019-04-08

## 2019-04-08 LAB
ABS NEUT (OLG): 1.45 X10(3)/MCL (ref 2.1–9.2)
ALBUMIN SERPL-MCNC: 3.5 GM/DL (ref 3.4–5)
ALBUMIN/GLOB SERPL: 0.9 RATIO (ref 1.1–2)
ALP SERPL-CCNC: 69 UNIT/L (ref 45–117)
ALT SERPL-CCNC: 29 UNIT/L (ref 12–78)
AST SERPL-CCNC: 35 UNIT/L (ref 15–37)
BASOPHILS # BLD AUTO: 0.02 X10(3)/MCL
BASOPHILS NFR BLD AUTO: 0 %
BILIRUB SERPL-MCNC: 0.4 MG/DL (ref 0.2–1)
BILIRUBIN DIRECT+TOT PNL SERPL-MCNC: <0.1 MG/DL
BILIRUBIN DIRECT+TOT PNL SERPL-MCNC: ABNORMAL MG/DL
BUN SERPL-MCNC: 11 MG/DL (ref 7–18)
CALCIUM SERPL-MCNC: 8.9 MG/DL (ref 8.5–10.1)
CEA SERPL-MCNC: 4 NG/ML
CHLORIDE SERPL-SCNC: 107 MMOL/L (ref 98–107)
CO2 SERPL-SCNC: 27 MMOL/L (ref 21–32)
CREAT SERPL-MCNC: 1.3 MG/DL (ref 0.6–1.3)
EOSINOPHIL # BLD AUTO: 0.02 X10(3)/MCL
EOSINOPHIL NFR BLD AUTO: 0 %
ERYTHROCYTE [DISTWIDTH] IN BLOOD BY AUTOMATED COUNT: 16.4 % (ref 11.5–14.5)
GLOBULIN SER-MCNC: 3.9 GM/ML (ref 2.3–3.5)
GLUCOSE SERPL-MCNC: 96 MG/DL (ref 74–106)
HCT VFR BLD AUTO: 39.2 % (ref 40–51)
HGB BLD-MCNC: 12.6 GM/DL (ref 13.5–17.5)
IMM GRANULOCYTES # BLD AUTO: 0.01 10*3/UL
IMM GRANULOCYTES NFR BLD AUTO: 0 %
LYMPHOCYTES # BLD AUTO: 1.79 X10(3)/MCL
LYMPHOCYTES NFR BLD AUTO: 49 % (ref 13–40)
MCH RBC QN AUTO: 27.2 PG (ref 26–34)
MCHC RBC AUTO-ENTMCNC: 32.1 GM/DL (ref 31–37)
MCV RBC AUTO: 84.7 FL (ref 80–100)
MONOCYTES # BLD AUTO: 0.37 X10(3)/MCL
MONOCYTES NFR BLD AUTO: 10 % (ref 4–12)
NEUTROPHILS # BLD AUTO: 1.45 X10(3)/MCL
NEUTROPHILS NFR BLD AUTO: 40 X10(3)/MCL
PLATELET # BLD AUTO: 146 X10(3)/MCL (ref 130–400)
PMV BLD AUTO: 9.5 FL (ref 7.4–10.4)
POTASSIUM SERPL-SCNC: 3.6 MMOL/L (ref 3.5–5.1)
PROT SERPL-MCNC: 7.4 GM/DL (ref 6.4–8.2)
PROT UR STRIP-MCNC: 19.4 MG/DL
RBC # BLD AUTO: 4.63 X10(6)/MCL (ref 4.5–5.9)
SODIUM SERPL-SCNC: 140 MMOL/L (ref 136–145)
WBC # SPEC AUTO: 3.7 X10(3)/MCL (ref 4.5–11)

## 2019-04-09 ENCOUNTER — HISTORICAL (OUTPATIENT)
Dept: INFUSION THERAPY | Facility: HOSPITAL | Age: 39
End: 2019-04-09

## 2019-04-11 ENCOUNTER — HISTORICAL (OUTPATIENT)
Dept: INFUSION THERAPY | Facility: HOSPITAL | Age: 39
End: 2019-04-11

## 2019-04-23 ENCOUNTER — HISTORICAL (OUTPATIENT)
Dept: INFUSION THERAPY | Facility: HOSPITAL | Age: 39
End: 2019-04-23

## 2019-04-23 LAB
ABS NEUT (OLG): 1.49 X10(3)/MCL (ref 2.1–9.2)
ALBUMIN SERPL-MCNC: 3.4 GM/DL (ref 3.4–5)
ALBUMIN/GLOB SERPL: 0.9 RATIO (ref 1.1–2)
ALP SERPL-CCNC: 71 UNIT/L (ref 45–117)
ALT SERPL-CCNC: 37 UNIT/L (ref 12–78)
APPEARANCE, UA: CLEAR
AST SERPL-CCNC: 40 UNIT/L (ref 15–37)
BACTERIA #/AREA URNS AUTO: ABNORMAL /[HPF]
BASOPHILS # BLD AUTO: 0.02 X10(3)/MCL
BASOPHILS NFR BLD AUTO: 0 %
BILIRUB SERPL-MCNC: 0.4 MG/DL (ref 0.2–1)
BILIRUB UR QL STRIP: NEGATIVE
BILIRUBIN DIRECT+TOT PNL SERPL-MCNC: <0.1 MG/DL
BILIRUBIN DIRECT+TOT PNL SERPL-MCNC: >0.3 MG/DL
BUN SERPL-MCNC: 8 MG/DL (ref 7–18)
CALCIUM SERPL-MCNC: 8.7 MG/DL (ref 8.5–10.1)
CHLORIDE SERPL-SCNC: 106 MMOL/L (ref 98–107)
CO2 SERPL-SCNC: 29 MMOL/L (ref 21–32)
COLOR UR: NORMAL
CREAT SERPL-MCNC: 1.2 MG/DL (ref 0.6–1.3)
EOSINOPHIL # BLD AUTO: 0.04 X10(3)/MCL
EOSINOPHIL NFR BLD AUTO: 1 %
ERYTHROCYTE [DISTWIDTH] IN BLOOD BY AUTOMATED COUNT: 16.1 % (ref 11.5–14.5)
GLOBULIN SER-MCNC: 3.7 GM/ML (ref 2.3–3.5)
GLUCOSE (UA): NORMAL
GLUCOSE SERPL-MCNC: 100 MG/DL (ref 74–106)
HCT VFR BLD AUTO: 38.4 % (ref 40–51)
HGB BLD-MCNC: 12.3 GM/DL (ref 13.5–17.5)
HGB UR QL STRIP: NEGATIVE
HYALINE CASTS #/AREA URNS LPF: ABNORMAL /[LPF]
IMM GRANULOCYTES # BLD AUTO: 0.01 10*3/UL
IMM GRANULOCYTES NFR BLD AUTO: 0 %
KETONES UR QL STRIP: NEGATIVE
LEUKOCYTE ESTERASE UR QL STRIP: NEGATIVE
LYMPHOCYTES # BLD AUTO: 1.91 X10(3)/MCL
LYMPHOCYTES NFR BLD AUTO: 49 % (ref 13–40)
MCH RBC QN AUTO: 27.7 PG (ref 26–34)
MCHC RBC AUTO-ENTMCNC: 32 GM/DL (ref 31–37)
MCV RBC AUTO: 86.5 FL (ref 80–100)
MONOCYTES # BLD AUTO: 0.43 X10(3)/MCL
MONOCYTES NFR BLD AUTO: 11 % (ref 4–12)
NEUTROPHILS # BLD AUTO: 1.49 X10(3)/MCL
NEUTROPHILS NFR BLD AUTO: 38 X10(3)/MCL
NITRITE UR QL STRIP: NEGATIVE
PH UR STRIP: 6 [PH] (ref 4.5–8)
PLATELET # BLD AUTO: 139 X10(3)/MCL (ref 130–400)
PMV BLD AUTO: 9.2 FL (ref 7.4–10.4)
POTASSIUM SERPL-SCNC: 3.5 MMOL/L (ref 3.5–5.1)
PROT SERPL-MCNC: 7.1 GM/DL (ref 6.4–8.2)
PROT UR QL STRIP: NEGATIVE
RBC # BLD AUTO: 4.44 X10(6)/MCL (ref 4.5–5.9)
RBC #/AREA URNS AUTO: ABNORMAL /[HPF]
SODIUM SERPL-SCNC: 140 MMOL/L (ref 136–145)
SP GR UR STRIP: 1.01 (ref 1–1.03)
SQUAMOUS #/AREA URNS LPF: ABNORMAL /[LPF]
UROBILINOGEN UR STRIP-ACNC: NORMAL
WBC # SPEC AUTO: 3.9 X10(3)/MCL (ref 4.5–11)
WBC #/AREA URNS AUTO: ABNORMAL /HPF

## 2019-04-25 ENCOUNTER — HISTORICAL (OUTPATIENT)
Dept: INFUSION THERAPY | Facility: HOSPITAL | Age: 39
End: 2019-04-25

## 2019-05-06 ENCOUNTER — HISTORICAL (OUTPATIENT)
Dept: ADMINISTRATIVE | Facility: HOSPITAL | Age: 39
End: 2019-05-06

## 2019-05-06 LAB
ABS NEUT (OLG): 1.77 X10(3)/MCL (ref 2.1–9.2)
ALBUMIN SERPL-MCNC: 3.4 GM/DL (ref 3.4–5)
ALBUMIN/GLOB SERPL: 0.9 RATIO (ref 1.1–2)
ALP SERPL-CCNC: 68 UNIT/L (ref 45–117)
ALT SERPL-CCNC: 32 UNIT/L (ref 12–78)
AST SERPL-CCNC: 33 UNIT/L (ref 15–37)
BASOPHILS # BLD AUTO: 0.01 X10(3)/MCL
BASOPHILS NFR BLD AUTO: 0 %
BILIRUB SERPL-MCNC: 0.4 MG/DL (ref 0.2–1)
BILIRUBIN DIRECT+TOT PNL SERPL-MCNC: 0.1 MG/DL
BILIRUBIN DIRECT+TOT PNL SERPL-MCNC: 0.3 MG/DL
BUN SERPL-MCNC: 11 MG/DL (ref 7–18)
CALCIUM SERPL-MCNC: 8.7 MG/DL (ref 8.5–10.1)
CEA SERPL-MCNC: 3.8 NG/ML
CHLORIDE SERPL-SCNC: 106 MMOL/L (ref 98–107)
CO2 SERPL-SCNC: 29 MMOL/L (ref 21–32)
CREAT SERPL-MCNC: 1.4 MG/DL (ref 0.6–1.3)
EOSINOPHIL # BLD AUTO: 0.03 X10(3)/MCL
EOSINOPHIL NFR BLD AUTO: 1 %
ERYTHROCYTE [DISTWIDTH] IN BLOOD BY AUTOMATED COUNT: 16.4 % (ref 11.5–14.5)
FERRITIN SERPL-MCNC: 64.4 NG/ML (ref 26–388)
GLOBULIN SER-MCNC: 3.9 GM/ML (ref 2.3–3.5)
GLUCOSE SERPL-MCNC: 94 MG/DL (ref 74–106)
HCT VFR BLD AUTO: 38.7 % (ref 40–51)
HGB BLD-MCNC: 12.4 GM/DL (ref 13.5–17.5)
IMM GRANULOCYTES # BLD AUTO: 0.01 10*3/UL
IMM GRANULOCYTES NFR BLD AUTO: 0 %
IRON SATN MFR SERPL: 18.6 % (ref 15–50)
IRON SERPL-MCNC: 65 MCG/DL (ref 65–175)
LYMPHOCYTES # BLD AUTO: 1.72 X10(3)/MCL
LYMPHOCYTES NFR BLD AUTO: 43 % (ref 13–40)
MCH RBC QN AUTO: 27.6 PG (ref 26–34)
MCHC RBC AUTO-ENTMCNC: 32 GM/DL (ref 31–37)
MCV RBC AUTO: 86.2 FL (ref 80–100)
MONOCYTES # BLD AUTO: 0.45 X10(3)/MCL
MONOCYTES NFR BLD AUTO: 11 % (ref 4–12)
NEUTROPHILS # BLD AUTO: 1.77 X10(3)/MCL
NEUTROPHILS NFR BLD AUTO: 44 X10(3)/MCL
PLATELET # BLD AUTO: 140 X10(3)/MCL (ref 130–400)
PMV BLD AUTO: 9.8 FL (ref 7.4–10.4)
POTASSIUM SERPL-SCNC: 3.4 MMOL/L (ref 3.5–5.1)
PROT SERPL-MCNC: 7.3 GM/DL (ref 6.4–8.2)
PROT UR STRIP-MCNC: 15 MG/DL
RBC # BLD AUTO: 4.49 X10(6)/MCL (ref 4.5–5.9)
SODIUM SERPL-SCNC: 139 MMOL/L (ref 136–145)
TIBC SERPL-MCNC: 349 MCG/DL (ref 250–450)
TRANSFERRIN SERPL-MCNC: 289 MG/DL (ref 200–360)
WBC # SPEC AUTO: 4 X10(3)/MCL (ref 4.5–11)

## 2019-05-07 ENCOUNTER — HISTORICAL (OUTPATIENT)
Dept: INFUSION THERAPY | Facility: HOSPITAL | Age: 39
End: 2019-05-07

## 2019-05-09 ENCOUNTER — HISTORICAL (OUTPATIENT)
Dept: INFUSION THERAPY | Facility: HOSPITAL | Age: 39
End: 2019-05-09

## 2019-05-21 ENCOUNTER — HISTORICAL (OUTPATIENT)
Dept: INFUSION THERAPY | Facility: HOSPITAL | Age: 39
End: 2019-05-21

## 2019-05-21 LAB
ABS NEUT (OLG): 1.66 X10(3)/MCL (ref 2.1–9.2)
ALBUMIN SERPL-MCNC: 3.4 GM/DL (ref 3.4–5)
ALBUMIN/GLOB SERPL: 0.9 RATIO (ref 1.1–2)
ALP SERPL-CCNC: 68 UNIT/L (ref 45–117)
ALT SERPL-CCNC: 30 UNIT/L (ref 12–78)
AST SERPL-CCNC: 31 UNIT/L (ref 15–37)
BASOPHILS # BLD AUTO: 0.02 X10(3)/MCL
BASOPHILS NFR BLD AUTO: 0 %
BILIRUB SERPL-MCNC: 0.5 MG/DL (ref 0.2–1)
BILIRUBIN DIRECT+TOT PNL SERPL-MCNC: 0.1 MG/DL
BILIRUBIN DIRECT+TOT PNL SERPL-MCNC: 0.4 MG/DL
BUN SERPL-MCNC: 14 MG/DL (ref 7–18)
CALCIUM SERPL-MCNC: 9.1 MG/DL (ref 8.5–10.1)
CHLORIDE SERPL-SCNC: 104 MMOL/L (ref 98–107)
CO2 SERPL-SCNC: 32 MMOL/L (ref 21–32)
CREAT SERPL-MCNC: 1.4 MG/DL (ref 0.6–1.3)
EOSINOPHIL # BLD AUTO: 0.04 X10(3)/MCL
EOSINOPHIL NFR BLD AUTO: 1 %
ERYTHROCYTE [DISTWIDTH] IN BLOOD BY AUTOMATED COUNT: 16.6 % (ref 11.5–14.5)
GLOBULIN SER-MCNC: 3.9 GM/ML (ref 2.3–3.5)
GLUCOSE SERPL-MCNC: 124 MG/DL (ref 74–106)
HCT VFR BLD AUTO: 39.8 % (ref 40–51)
HGB BLD-MCNC: 12.7 GM/DL (ref 13.5–17.5)
IMM GRANULOCYTES # BLD AUTO: 0.01 10*3/UL
IMM GRANULOCYTES NFR BLD AUTO: 0 %
LYMPHOCYTES # BLD AUTO: 1.84 X10(3)/MCL
LYMPHOCYTES NFR BLD AUTO: 47 % (ref 13–40)
MAGNESIUM SERPL-MCNC: 2 MG/DL (ref 1.6–2.6)
MCH RBC QN AUTO: 27.5 PG (ref 26–34)
MCHC RBC AUTO-ENTMCNC: 31.9 GM/DL (ref 31–37)
MCV RBC AUTO: 86.1 FL (ref 80–100)
MONOCYTES # BLD AUTO: 0.33 X10(3)/MCL
MONOCYTES NFR BLD AUTO: 8 % (ref 4–12)
NEUTROPHILS # BLD AUTO: 1.66 X10(3)/MCL
NEUTROPHILS NFR BLD AUTO: 42 X10(3)/MCL
PLATELET # BLD AUTO: 140 X10(3)/MCL (ref 130–400)
PMV BLD AUTO: 9.7 FL (ref 7.4–10.4)
POTASSIUM SERPL-SCNC: 3.4 MMOL/L (ref 3.5–5.1)
PROT SERPL-MCNC: 7.3 GM/DL (ref 6.4–8.2)
PROT UR STRIP-MCNC: 8.7 MG/DL
RBC # BLD AUTO: 4.62 X10(6)/MCL (ref 4.5–5.9)
SODIUM SERPL-SCNC: 140 MMOL/L (ref 136–145)
WBC # SPEC AUTO: 3.9 X10(3)/MCL (ref 4.5–11)

## 2019-05-23 ENCOUNTER — HISTORICAL (OUTPATIENT)
Dept: INFUSION THERAPY | Facility: HOSPITAL | Age: 39
End: 2019-05-23

## 2019-06-03 ENCOUNTER — HISTORICAL (OUTPATIENT)
Dept: ADMINISTRATIVE | Facility: HOSPITAL | Age: 39
End: 2019-06-03

## 2019-06-03 LAB
ABS NEUT (OLG): 1.99 X10(3)/MCL (ref 2.1–9.2)
ALBUMIN SERPL-MCNC: 3.7 GM/DL (ref 3.4–5)
ALBUMIN/GLOB SERPL: 0.8 RATIO (ref 1.1–2)
ALP SERPL-CCNC: 78 UNIT/L (ref 45–117)
ALT SERPL-CCNC: 31 UNIT/L (ref 12–78)
AST SERPL-CCNC: 32 UNIT/L (ref 15–37)
BASOPHILS # BLD AUTO: 0.02 X10(3)/MCL
BASOPHILS NFR BLD AUTO: 0 %
BILIRUB SERPL-MCNC: 0.5 MG/DL (ref 0.2–1)
BILIRUBIN DIRECT+TOT PNL SERPL-MCNC: 0.2 MG/DL
BILIRUBIN DIRECT+TOT PNL SERPL-MCNC: 0.3 MG/DL
BUN SERPL-MCNC: 9 MG/DL (ref 7–18)
CALCIUM SERPL-MCNC: 9.6 MG/DL (ref 8.5–10.1)
CEA SERPL-MCNC: 6.2 NG/ML
CHLORIDE SERPL-SCNC: 104 MMOL/L (ref 98–107)
CO2 SERPL-SCNC: 31 MMOL/L (ref 21–32)
CREAT SERPL-MCNC: 1.4 MG/DL (ref 0.6–1.3)
EOSINOPHIL # BLD AUTO: 0.02 X10(3)/MCL
EOSINOPHIL NFR BLD AUTO: 0 %
ERYTHROCYTE [DISTWIDTH] IN BLOOD BY AUTOMATED COUNT: 17.2 % (ref 11.5–14.5)
GLOBULIN SER-MCNC: 4.5 GM/ML (ref 2.3–3.5)
GLUCOSE SERPL-MCNC: 94 MG/DL (ref 74–106)
HCT VFR BLD AUTO: 41.8 % (ref 40–51)
HGB BLD-MCNC: 13.4 GM/DL (ref 13.5–17.5)
IMM GRANULOCYTES # BLD AUTO: 0.01 10*3/UL
IMM GRANULOCYTES NFR BLD AUTO: 0 %
LYMPHOCYTES # BLD AUTO: 2.13 X10(3)/MCL
LYMPHOCYTES NFR BLD AUTO: 46 % (ref 13–40)
MAGNESIUM SERPL-MCNC: 2 MG/DL (ref 1.6–2.6)
MCH RBC QN AUTO: 27.4 PG (ref 26–34)
MCHC RBC AUTO-ENTMCNC: 32.1 GM/DL (ref 31–37)
MCV RBC AUTO: 85.5 FL (ref 80–100)
MONOCYTES # BLD AUTO: 0.46 X10(3)/MCL
MONOCYTES NFR BLD AUTO: 10 % (ref 4–12)
NEUTROPHILS # BLD AUTO: 1.99 X10(3)/MCL
NEUTROPHILS NFR BLD AUTO: 43 X10(3)/MCL
PLATELET # BLD AUTO: 165 X10(3)/MCL (ref 130–400)
PMV BLD AUTO: 9.8 FL (ref 7.4–10.4)
POTASSIUM SERPL-SCNC: 3.4 MMOL/L (ref 3.5–5.1)
PROT SERPL-MCNC: 8.2 GM/DL (ref 6.4–8.2)
PROT UR STRIP-MCNC: 8.1 MG/DL
RBC # BLD AUTO: 4.89 X10(6)/MCL (ref 4.5–5.9)
SODIUM SERPL-SCNC: 140 MMOL/L (ref 136–145)
WBC # SPEC AUTO: 4.6 X10(3)/MCL (ref 4.5–11)

## 2019-06-04 ENCOUNTER — HISTORICAL (OUTPATIENT)
Dept: INFUSION THERAPY | Facility: HOSPITAL | Age: 39
End: 2019-06-04

## 2019-06-06 ENCOUNTER — HISTORICAL (OUTPATIENT)
Dept: INFUSION THERAPY | Facility: HOSPITAL | Age: 39
End: 2019-06-06

## 2019-06-17 ENCOUNTER — HISTORICAL (OUTPATIENT)
Dept: ADMINISTRATIVE | Facility: HOSPITAL | Age: 39
End: 2019-06-17

## 2019-06-17 LAB
ABS NEUT (OLG): 1.33 X10(3)/MCL (ref 2.1–9.2)
ALBUMIN SERPL-MCNC: 3.5 GM/DL (ref 3.4–5)
ALBUMIN/GLOB SERPL: 0.9 RATIO (ref 1.1–2)
ALP SERPL-CCNC: 69 UNIT/L (ref 45–117)
ALT SERPL-CCNC: 28 UNIT/L (ref 12–78)
AST SERPL-CCNC: 30 UNIT/L (ref 15–37)
BASOPHILS # BLD AUTO: 0.02 X10(3)/MCL
BASOPHILS NFR BLD AUTO: 0 %
BILIRUB SERPL-MCNC: 0.3 MG/DL (ref 0.2–1)
BILIRUBIN DIRECT+TOT PNL SERPL-MCNC: 0.1 MG/DL
BILIRUBIN DIRECT+TOT PNL SERPL-MCNC: 0.2 MG/DL
BUN SERPL-MCNC: 15 MG/DL (ref 7–18)
CALCIUM SERPL-MCNC: 9.5 MG/DL (ref 8.5–10.1)
CEA SERPL-MCNC: 5.5 NG/ML
CHLORIDE SERPL-SCNC: 105 MMOL/L (ref 98–107)
CO2 SERPL-SCNC: 30 MMOL/L (ref 21–32)
CREAT SERPL-MCNC: 1.4 MG/DL (ref 0.6–1.3)
EOSINOPHIL # BLD AUTO: 0.04 10*3/UL
EOSINOPHIL NFR BLD AUTO: 1 %
ERYTHROCYTE [DISTWIDTH] IN BLOOD BY AUTOMATED COUNT: 16.8 % (ref 11.5–14.5)
GLOBULIN SER-MCNC: 4 GM/ML (ref 2.3–3.5)
GLUCOSE SERPL-MCNC: 95 MG/DL (ref 74–106)
HCT VFR BLD AUTO: 38.8 % (ref 40–51)
HGB BLD-MCNC: 12.2 GM/DL (ref 13.5–17.5)
IMM GRANULOCYTES # BLD AUTO: 0.01 10*3/UL
IMM GRANULOCYTES NFR BLD AUTO: 0 %
LYMPHOCYTES # BLD AUTO: 2.08 X10(3)/MCL
LYMPHOCYTES NFR BLD AUTO: 53 % (ref 13–40)
MCH RBC QN AUTO: 27.1 PG (ref 26–34)
MCHC RBC AUTO-ENTMCNC: 31.4 GM/DL (ref 31–37)
MCV RBC AUTO: 86.2 FL (ref 80–100)
MONOCYTES # BLD AUTO: 0.46 X10(3)/MCL
MONOCYTES NFR BLD AUTO: 12 % (ref 4–12)
NEUTROPHILS # BLD AUTO: 1.33 X10(3)/MCL
NEUTROPHILS NFR BLD AUTO: 34 X10(3)/MCL
PLATELET # BLD AUTO: 166 X10(3)/MCL (ref 130–400)
PMV BLD AUTO: 10.7 FL (ref 7.4–10.4)
POTASSIUM SERPL-SCNC: 3.6 MMOL/L (ref 3.5–5.1)
PROT SERPL-MCNC: 7.2 GM/DL
PROT SERPL-MCNC: 7.3 GM/DL
PROT SERPL-MCNC: 7.5 GM/DL (ref 6.4–8.2)
PROT UR STRIP-MCNC: 10 MG/DL
RBC # BLD AUTO: 4.5 X10(6)/MCL (ref 4.5–5.9)
SODIUM SERPL-SCNC: 142 MMOL/L (ref 136–145)
WBC # SPEC AUTO: 3.9 X10(3)/MCL (ref 4.5–11)

## 2019-06-18 ENCOUNTER — HISTORICAL (OUTPATIENT)
Dept: INFUSION THERAPY | Facility: HOSPITAL | Age: 39
End: 2019-06-18

## 2019-06-20 ENCOUNTER — HISTORICAL (OUTPATIENT)
Dept: INFUSION THERAPY | Facility: HOSPITAL | Age: 39
End: 2019-06-20

## 2019-06-24 ENCOUNTER — HISTORICAL (OUTPATIENT)
Dept: RADIOLOGY | Facility: HOSPITAL | Age: 39
End: 2019-06-24

## 2019-07-01 ENCOUNTER — HISTORICAL (OUTPATIENT)
Dept: ADMINISTRATIVE | Facility: HOSPITAL | Age: 39
End: 2019-07-01

## 2019-07-01 LAB
ABS NEUT (OLG): 1.82 X10(3)/MCL (ref 2.1–9.2)
ALBUMIN SERPL-MCNC: 3.6 GM/DL (ref 3.4–5)
ALBUMIN/GLOB SERPL: 0.9 RATIO (ref 1.1–2)
ALP SERPL-CCNC: 64 UNIT/L (ref 45–117)
ALT SERPL-CCNC: 33 UNIT/L (ref 12–78)
AST SERPL-CCNC: 43 UNIT/L (ref 15–37)
BASOPHILS # BLD AUTO: 0.02 X10(3)/MCL
BASOPHILS NFR BLD AUTO: 0 %
BILIRUB SERPL-MCNC: 0.5 MG/DL (ref 0.2–1)
BUN SERPL-MCNC: 12 MG/DL (ref 7–18)
CALCIUM SERPL-MCNC: 9.5 MG/DL (ref 8.5–10.1)
CEA SERPL-MCNC: 5.6 NG/ML
CHLORIDE SERPL-SCNC: 104 MMOL/L (ref 98–107)
CO2 SERPL-SCNC: 29 MMOL/L (ref 21–32)
CREAT SERPL-MCNC: 1.4 MG/DL (ref 0.6–1.3)
EOSINOPHIL # BLD AUTO: 0.03 X10(3)/MCL
EOSINOPHIL NFR BLD AUTO: 1 %
ERYTHROCYTE [DISTWIDTH] IN BLOOD BY AUTOMATED COUNT: 17.5 % (ref 11.5–14.5)
GLOBULIN SER-MCNC: 4 GM/ML (ref 2.3–3.5)
GLUCOSE SERPL-MCNC: 88 MG/DL (ref 74–106)
HCT VFR BLD AUTO: 39.2 % (ref 40–51)
HGB BLD-MCNC: 12.4 GM/DL (ref 13.5–17.5)
IMM GRANULOCYTES # BLD AUTO: 0.02 10*3/UL
IMM GRANULOCYTES NFR BLD AUTO: 0 %
LYMPHOCYTES # BLD AUTO: 1.83 X10(3)/MCL
LYMPHOCYTES NFR BLD AUTO: 45 % (ref 13–40)
MCH RBC QN AUTO: 27.6 PG (ref 26–34)
MCHC RBC AUTO-ENTMCNC: 31.6 GM/DL (ref 31–37)
MCV RBC AUTO: 87.3 FL (ref 80–100)
MONOCYTES # BLD AUTO: 0.37 X10(3)/MCL
MONOCYTES NFR BLD AUTO: 9 % (ref 4–12)
NEUTROPHILS # BLD AUTO: 1.82 X10(3)/MCL
NEUTROPHILS NFR BLD AUTO: 45 X10(3)/MCL
PLATELET # BLD AUTO: 150 X10(3)/MCL (ref 130–400)
PMV BLD AUTO: 10 FL (ref 7.4–10.4)
POTASSIUM SERPL-SCNC: 3.4 MMOL/L (ref 3.5–5.1)
PROT SERPL-MCNC: 7.6 GM/DL (ref 6.4–8.2)
PROT UR STRIP-MCNC: 7 MG/DL
RBC # BLD AUTO: 4.49 X10(6)/MCL (ref 4.5–5.9)
SODIUM SERPL-SCNC: 140 MMOL/L (ref 136–145)
WBC # SPEC AUTO: 4.1 X10(3)/MCL (ref 4.5–11)

## 2019-07-10 ENCOUNTER — HISTORICAL (OUTPATIENT)
Dept: INFUSION THERAPY | Facility: HOSPITAL | Age: 39
End: 2019-07-10

## 2019-07-10 LAB
ABS NEUT (OLG): 1.43 X10(3)/MCL (ref 2.1–9.2)
ALBUMIN SERPL-MCNC: 3.4 GM/DL (ref 3.4–5)
ALBUMIN/GLOB SERPL: 0.8 RATIO (ref 1.1–2)
ALP SERPL-CCNC: 72 UNIT/L (ref 45–117)
ALT SERPL-CCNC: 52 UNIT/L (ref 12–78)
AST SERPL-CCNC: 51 UNIT/L (ref 15–37)
BASOPHILS # BLD AUTO: 0.02 X10(3)/MCL
BASOPHILS NFR BLD AUTO: 0 %
BILIRUB SERPL-MCNC: 0.5 MG/DL (ref 0.2–1)
BILIRUBIN DIRECT+TOT PNL SERPL-MCNC: 0.1 MG/DL
BILIRUBIN DIRECT+TOT PNL SERPL-MCNC: 0.4 MG/DL
BUN SERPL-MCNC: 9 MG/DL (ref 7–18)
CALCIUM SERPL-MCNC: 8.7 MG/DL (ref 8.5–10.1)
CHLORIDE SERPL-SCNC: 106 MMOL/L (ref 98–107)
CO2 SERPL-SCNC: 32 MMOL/L (ref 21–32)
CREAT SERPL-MCNC: 1.5 MG/DL (ref 0.6–1.3)
EOSINOPHIL # BLD AUTO: 0.03 X10(3)/MCL
EOSINOPHIL NFR BLD AUTO: 1 %
ERYTHROCYTE [DISTWIDTH] IN BLOOD BY AUTOMATED COUNT: 18.4 % (ref 11.5–14.5)
GLOBULIN SER-MCNC: 4 GM/ML (ref 2.3–3.5)
GLUCOSE SERPL-MCNC: 90 MG/DL (ref 74–106)
HCT VFR BLD AUTO: 40 % (ref 40–51)
HGB BLD-MCNC: 12.3 GM/DL (ref 13.5–17.5)
IMM GRANULOCYTES # BLD AUTO: 0.01 10*3/UL
IMM GRANULOCYTES NFR BLD AUTO: 0 %
LYMPHOCYTES # BLD AUTO: 2.22 X10(3)/MCL
LYMPHOCYTES NFR BLD AUTO: 52 % (ref 13–40)
MCH RBC QN AUTO: 27.7 PG (ref 26–34)
MCHC RBC AUTO-ENTMCNC: 30.8 GM/DL (ref 31–37)
MCV RBC AUTO: 90.1 FL (ref 80–100)
MONOCYTES # BLD AUTO: 0.53 X10(3)/MCL
MONOCYTES NFR BLD AUTO: 12 % (ref 4–12)
NEUTROPHILS # BLD AUTO: 1.43 X10(3)/MCL
NEUTROPHILS NFR BLD AUTO: 34 X10(3)/MCL
PLATELET # BLD AUTO: 180 X10(3)/MCL (ref 130–400)
PMV BLD AUTO: 10.3 FL (ref 7.4–10.4)
POTASSIUM SERPL-SCNC: 3.9 MMOL/L (ref 3.5–5.1)
PROT SERPL-MCNC: 7.4 GM/DL (ref 6.4–8.2)
PROT UR STRIP-MCNC: <5 MG/DL
RBC # BLD AUTO: 4.44 X10(6)/MCL (ref 4.5–5.9)
SODIUM SERPL-SCNC: 140 MMOL/L (ref 136–145)
WBC # SPEC AUTO: 4.2 X10(3)/MCL (ref 4.5–11)

## 2019-07-12 ENCOUNTER — HISTORICAL (OUTPATIENT)
Dept: INFUSION THERAPY | Facility: HOSPITAL | Age: 39
End: 2019-07-12

## 2019-07-22 ENCOUNTER — HISTORICAL (OUTPATIENT)
Dept: ADMINISTRATIVE | Facility: HOSPITAL | Age: 39
End: 2019-07-22

## 2019-07-22 LAB
ABS NEUT (OLG): 1.4 X10(3)/MCL (ref 2.1–9.2)
ALBUMIN SERPL-MCNC: 3.5 GM/DL (ref 3.4–5)
ALBUMIN/GLOB SERPL: 0.9 RATIO (ref 1.1–2)
ALP SERPL-CCNC: 74 UNIT/L (ref 45–117)
ALT SERPL-CCNC: 36 UNIT/L (ref 12–78)
AST SERPL-CCNC: 38 UNIT/L (ref 15–37)
BASOPHILS # BLD AUTO: 0.02 X10(3)/MCL
BASOPHILS NFR BLD AUTO: 0 %
BILIRUB SERPL-MCNC: 0.4 MG/DL (ref 0.2–1)
BILIRUBIN DIRECT+TOT PNL SERPL-MCNC: <0.1 MG/DL
BILIRUBIN DIRECT+TOT PNL SERPL-MCNC: ABNORMAL MG/DL
BUN SERPL-MCNC: 9 MG/DL (ref 7–18)
CALCIUM SERPL-MCNC: 9.7 MG/DL (ref 8.5–10.1)
CHLORIDE SERPL-SCNC: 108 MMOL/L (ref 98–107)
CO2 SERPL-SCNC: 30 MMOL/L (ref 21–32)
CREAT SERPL-MCNC: 1.3 MG/DL (ref 0.6–1.3)
EOSINOPHIL # BLD AUTO: 0.02 10*3/UL
EOSINOPHIL NFR BLD AUTO: 0 %
ERYTHROCYTE [DISTWIDTH] IN BLOOD BY AUTOMATED COUNT: 17.4 % (ref 11.5–14.5)
GLOBULIN SER-MCNC: 4.1 GM/ML (ref 2.3–3.5)
GLUCOSE SERPL-MCNC: 90 MG/DL (ref 74–106)
HCT VFR BLD AUTO: 39.4 % (ref 40–51)
HGB BLD-MCNC: 12.5 GM/DL (ref 13.5–17.5)
IMM GRANULOCYTES # BLD AUTO: 0.01 10*3/UL
IMM GRANULOCYTES NFR BLD AUTO: 0 %
LYMPHOCYTES # BLD AUTO: 1.86 X10(3)/MCL
LYMPHOCYTES NFR BLD AUTO: 51 % (ref 13–40)
MCH RBC QN AUTO: 27.6 PG (ref 26–34)
MCHC RBC AUTO-ENTMCNC: 31.7 GM/DL (ref 31–37)
MCV RBC AUTO: 87 FL (ref 80–100)
MONOCYTES # BLD AUTO: 0.33 X10(3)/MCL
MONOCYTES NFR BLD AUTO: 9 % (ref 0–24)
NEUTROPHILS # BLD AUTO: 1.4 X10(3)/MCL
NEUTROPHILS NFR BLD AUTO: 38 X10(3)/MCL
PLATELET # BLD AUTO: 166 X10(3)/MCL (ref 130–400)
PMV BLD AUTO: 10.6 FL (ref 7.4–10.4)
POTASSIUM SERPL-SCNC: 3.5 MMOL/L (ref 3.5–5.1)
PROT SERPL-MCNC: 7.6 GM/DL (ref 6.4–8.2)
RBC # BLD AUTO: 4.53 X10(6)/MCL (ref 4.5–5.9)
SODIUM SERPL-SCNC: 142 MMOL/L (ref 136–145)
WBC # SPEC AUTO: 3.6 X10(3)/MCL (ref 4.5–11)

## 2019-07-23 ENCOUNTER — HISTORICAL (OUTPATIENT)
Dept: INFUSION THERAPY | Facility: HOSPITAL | Age: 39
End: 2019-07-23

## 2019-07-23 LAB — PROT UR STRIP-MCNC: 9.3 MG/DL

## 2019-07-25 ENCOUNTER — HISTORICAL (OUTPATIENT)
Dept: INFUSION THERAPY | Facility: HOSPITAL | Age: 39
End: 2019-07-25

## 2019-08-05 ENCOUNTER — HISTORICAL (OUTPATIENT)
Dept: ADMINISTRATIVE | Facility: HOSPITAL | Age: 39
End: 2019-08-05

## 2019-08-05 LAB
ABS NEUT (OLG): 1.75 X10(3)/MCL (ref 2.1–9.2)
ALBUMIN SERPL-MCNC: 3.6 GM/DL (ref 3.4–5)
ALBUMIN/GLOB SERPL: 0.9 RATIO (ref 1.1–2)
ALP SERPL-CCNC: 74 UNIT/L (ref 45–117)
ALT SERPL-CCNC: 39 UNIT/L (ref 12–78)
AST SERPL-CCNC: 40 UNIT/L (ref 15–37)
BASOPHILS # BLD AUTO: 0.03 X10(3)/MCL
BASOPHILS NFR BLD AUTO: 1 %
BILIRUB SERPL-MCNC: 0.6 MG/DL (ref 0.2–1)
BILIRUBIN DIRECT+TOT PNL SERPL-MCNC: 0.1 MG/DL
BILIRUBIN DIRECT+TOT PNL SERPL-MCNC: 0.5 MG/DL
BUN SERPL-MCNC: 14 MG/DL (ref 7–18)
CALCIUM SERPL-MCNC: 9.1 MG/DL (ref 8.5–10.1)
CEA SERPL-MCNC: 5.8 NG/ML
CHLORIDE SERPL-SCNC: 107 MMOL/L (ref 98–107)
CO2 SERPL-SCNC: 28 MMOL/L (ref 21–32)
CREAT SERPL-MCNC: 1.4 MG/DL (ref 0.6–1.3)
EOSINOPHIL # BLD AUTO: 0.01 10*3/UL
EOSINOPHIL NFR BLD AUTO: 0 %
ERYTHROCYTE [DISTWIDTH] IN BLOOD BY AUTOMATED COUNT: 17.1 % (ref 11.5–14.5)
GLOBULIN SER-MCNC: 4.2 GM/ML (ref 2.3–3.5)
GLUCOSE SERPL-MCNC: 81 MG/DL (ref 74–106)
HCT VFR BLD AUTO: 39.5 % (ref 40–51)
HGB BLD-MCNC: 12.7 GM/DL (ref 13.5–17.5)
IMM GRANULOCYTES # BLD AUTO: 0.01 10*3/UL
IMM GRANULOCYTES NFR BLD AUTO: 0 %
LYMPHOCYTES # BLD AUTO: 2.06 X10(3)/MCL
LYMPHOCYTES NFR BLD AUTO: 49 % (ref 13–40)
MAGNESIUM SERPL-MCNC: 2 MG/DL (ref 1.6–2.6)
MCH RBC QN AUTO: 28.1 PG (ref 26–34)
MCHC RBC AUTO-ENTMCNC: 32.2 GM/DL (ref 31–37)
MCV RBC AUTO: 87.4 FL (ref 80–100)
MONOCYTES # BLD AUTO: 0.38 X10(3)/MCL
MONOCYTES NFR BLD AUTO: 9 % (ref 0–24)
NEUTROPHILS # BLD AUTO: 1.75 X10(3)/MCL
NEUTROPHILS NFR BLD AUTO: 41 X10(3)/MCL
PLATELET # BLD AUTO: 154 X10(3)/MCL (ref 130–400)
PMV BLD AUTO: 10.3 FL (ref 7.4–10.4)
POTASSIUM SERPL-SCNC: 3.4 MMOL/L (ref 3.5–5.1)
PROT SERPL-MCNC: 7.8 GM/DL (ref 6.4–8.2)
PROT UR STRIP-MCNC: 6.5 MG/DL
RBC # BLD AUTO: 4.52 X10(6)/MCL (ref 4.5–5.9)
SODIUM SERPL-SCNC: 140 MMOL/L (ref 136–145)
WBC # SPEC AUTO: 4.2 X10(3)/MCL (ref 4.5–11)

## 2019-08-06 ENCOUNTER — HISTORICAL (OUTPATIENT)
Dept: INFUSION THERAPY | Facility: HOSPITAL | Age: 39
End: 2019-08-06

## 2019-08-08 ENCOUNTER — HISTORICAL (OUTPATIENT)
Dept: INFUSION THERAPY | Facility: HOSPITAL | Age: 39
End: 2019-08-08

## 2019-08-20 ENCOUNTER — HISTORICAL (OUTPATIENT)
Dept: ADMINISTRATIVE | Facility: HOSPITAL | Age: 39
End: 2019-08-20

## 2019-08-20 LAB
ABS NEUT (OLG): 1.7 X10(3)/MCL (ref 2.1–9.2)
ALBUMIN SERPL-MCNC: 3.8 GM/DL (ref 3.4–5)
ALBUMIN/GLOB SERPL: 0.9 RATIO (ref 1.1–2)
ALP SERPL-CCNC: 76 UNIT/L (ref 45–117)
ALT SERPL-CCNC: 36 UNIT/L (ref 12–78)
AST SERPL-CCNC: 37 UNIT/L (ref 15–37)
BASOPHILS # BLD AUTO: 0.02 X10(3)/MCL
BASOPHILS NFR BLD AUTO: 0 %
BILIRUB SERPL-MCNC: 0.7 MG/DL (ref 0.2–1)
BILIRUBIN DIRECT+TOT PNL SERPL-MCNC: 0.1 MG/DL
BILIRUBIN DIRECT+TOT PNL SERPL-MCNC: 0.6 MG/DL
BUN SERPL-MCNC: 9 MG/DL (ref 7–18)
CALCIUM SERPL-MCNC: 9.4 MG/DL (ref 8.5–10.1)
CEA SERPL-MCNC: 6.8 NG/ML
CHLORIDE SERPL-SCNC: 106 MMOL/L (ref 98–107)
CO2 SERPL-SCNC: 29 MMOL/L (ref 21–32)
CREAT SERPL-MCNC: 1.4 MG/DL (ref 0.6–1.3)
EOSINOPHIL # BLD AUTO: 0.02 X10(3)/MCL
EOSINOPHIL NFR BLD AUTO: 0 %
ERYTHROCYTE [DISTWIDTH] IN BLOOD BY AUTOMATED COUNT: 16.4 % (ref 11.5–14.5)
GLOBULIN SER-MCNC: 4.3 GM/ML (ref 2.3–3.5)
GLUCOSE SERPL-MCNC: 91 MG/DL (ref 74–106)
HCT VFR BLD AUTO: 41.6 % (ref 40–51)
HGB BLD-MCNC: 12.9 GM/DL (ref 13.5–17.5)
LYMPHOCYTES # BLD AUTO: 1.76 X10(3)/MCL
LYMPHOCYTES NFR BLD AUTO: 46 % (ref 13–40)
MAGNESIUM SERPL-MCNC: 2.1 MG/DL (ref 1.6–2.6)
MCH RBC QN AUTO: 27.9 PG (ref 26–34)
MCHC RBC AUTO-ENTMCNC: 31 GM/DL (ref 31–37)
MCV RBC AUTO: 90 FL (ref 80–100)
MONOCYTES # BLD AUTO: 0.3 X10(3)/MCL
MONOCYTES NFR BLD AUTO: 8 % (ref 0–24)
NEUTROPHILS # BLD AUTO: 1.7 X10(3)/MCL
NEUTROPHILS NFR BLD AUTO: 45 X10(3)/MCL
PLATELET # BLD AUTO: 140 X10(3)/MCL (ref 130–400)
PMV BLD AUTO: 11.3 FL (ref 7.4–10.4)
POTASSIUM SERPL-SCNC: 3.4 MMOL/L (ref 3.5–5.1)
PROT SERPL-MCNC: 8.1 GM/DL (ref 6.4–8.2)
PROT UR STRIP-MCNC: 8.3 MG/DL
RBC # BLD AUTO: 4.62 X10(6)/MCL (ref 4.5–5.9)
SODIUM SERPL-SCNC: 143 MMOL/L (ref 136–145)
WBC # SPEC AUTO: 3.8 X10(3)/MCL (ref 4.5–11)

## 2019-08-21 ENCOUNTER — HISTORICAL (OUTPATIENT)
Dept: INFUSION THERAPY | Facility: HOSPITAL | Age: 39
End: 2019-08-21

## 2019-08-23 ENCOUNTER — HISTORICAL (OUTPATIENT)
Dept: INFUSION THERAPY | Facility: HOSPITAL | Age: 39
End: 2019-08-23

## 2019-09-04 ENCOUNTER — HISTORICAL (OUTPATIENT)
Dept: INFUSION THERAPY | Facility: HOSPITAL | Age: 39
End: 2019-09-04

## 2019-09-04 LAB
ABS NEUT (OLG): 1.73 X10(3)/MCL (ref 2.1–9.2)
ALBUMIN SERPL-MCNC: 3.4 GM/DL (ref 3.4–5)
ALBUMIN/GLOB SERPL: 0.9 RATIO (ref 1.1–2)
ALP SERPL-CCNC: 69 UNIT/L (ref 45–117)
ALT SERPL-CCNC: 30 UNIT/L (ref 12–78)
AST SERPL-CCNC: 37 UNIT/L (ref 15–37)
BASOPHILS # BLD AUTO: 0 X10(3)/MCL (ref 0–0.2)
BASOPHILS NFR BLD AUTO: 1 %
BILIRUB SERPL-MCNC: 0.4 MG/DL (ref 0.2–1)
BILIRUBIN DIRECT+TOT PNL SERPL-MCNC: 0.1 MG/DL
BILIRUBIN DIRECT+TOT PNL SERPL-MCNC: 0.3 MG/DL
BUN SERPL-MCNC: 12 MG/DL (ref 7–18)
CALCIUM SERPL-MCNC: 9.4 MG/DL (ref 8.5–10.1)
CHLORIDE SERPL-SCNC: 109 MMOL/L (ref 98–107)
CO2 SERPL-SCNC: 30 MMOL/L (ref 21–32)
CREAT SERPL-MCNC: 1.4 MG/DL (ref 0.6–1.3)
EOSINOPHIL # BLD AUTO: 0 X10(3)/MCL (ref 0–0.9)
EOSINOPHIL NFR BLD AUTO: 1 %
ERYTHROCYTE [DISTWIDTH] IN BLOOD BY AUTOMATED COUNT: 15.9 % (ref 11.5–14.5)
GLOBULIN SER-MCNC: 3.9 GM/ML (ref 2.3–3.5)
GLUCOSE SERPL-MCNC: 90 MG/DL (ref 74–106)
HCT VFR BLD AUTO: 36.3 % (ref 40–51)
HGB BLD-MCNC: 11.7 GM/DL (ref 13.5–17.5)
IMM GRANULOCYTES # BLD AUTO: 0.01 10*3/UL
IMM GRANULOCYTES NFR BLD AUTO: 0 %
LYMPHOCYTES # BLD AUTO: 2.1 X10(3)/MCL (ref 0.6–4.6)
LYMPHOCYTES NFR BLD AUTO: 48 %
MAGNESIUM SERPL-MCNC: 1.8 MG/DL (ref 1.6–2.6)
MCH RBC QN AUTO: 28.3 PG (ref 26–34)
MCHC RBC AUTO-ENTMCNC: 32.2 GM/DL (ref 31–37)
MCV RBC AUTO: 87.7 FL (ref 80–100)
MONOCYTES # BLD AUTO: 0.5 X10(3)/MCL (ref 0.1–1.3)
MONOCYTES NFR BLD AUTO: 12 %
NEUTROPHILS # BLD AUTO: 1.73 X10(3)/MCL (ref 2.1–9.2)
NEUTROPHILS NFR BLD AUTO: 39 %
PLATELET # BLD AUTO: 150 X10(3)/MCL (ref 130–400)
PMV BLD AUTO: 10.7 FL (ref 7.4–10.4)
POTASSIUM SERPL-SCNC: 3.8 MMOL/L (ref 3.5–5.1)
PROT SERPL-MCNC: 7.3 GM/DL (ref 6.4–8.2)
PROT UR STRIP-MCNC: 13.2 MG/DL
RBC # BLD AUTO: 4.14 X10(6)/MCL (ref 4.5–5.9)
SODIUM SERPL-SCNC: 144 MMOL/L (ref 136–145)
WBC # SPEC AUTO: 4.4 X10(3)/MCL (ref 4.5–11)

## 2019-09-06 ENCOUNTER — HISTORICAL (OUTPATIENT)
Dept: INFUSION THERAPY | Facility: HOSPITAL | Age: 39
End: 2019-09-06

## 2019-09-17 ENCOUNTER — HISTORICAL (OUTPATIENT)
Dept: ADMINISTRATIVE | Facility: HOSPITAL | Age: 39
End: 2019-09-17

## 2019-09-17 LAB
ABS NEUT (OLG): 1.7 X10(3)/MCL (ref 2.1–9.2)
ALBUMIN SERPL-MCNC: 3.4 GM/DL (ref 3.4–5)
ALBUMIN/GLOB SERPL: 0.9 RATIO (ref 1.1–2)
ALP SERPL-CCNC: 66 UNIT/L (ref 45–117)
ALT SERPL-CCNC: 29 UNIT/L (ref 12–78)
AST SERPL-CCNC: 42 UNIT/L (ref 15–37)
BASOPHILS # BLD AUTO: 0 X10(3)/MCL (ref 0–0.2)
BASOPHILS NFR BLD AUTO: 0 %
BILIRUB SERPL-MCNC: 0.7 MG/DL (ref 0.2–1)
BILIRUBIN DIRECT+TOT PNL SERPL-MCNC: 0.1 MG/DL (ref 0–0.2)
BILIRUBIN DIRECT+TOT PNL SERPL-MCNC: 0.6 MG/DL
BUN SERPL-MCNC: 8 MG/DL (ref 7–18)
CALCIUM SERPL-MCNC: 8.8 MG/DL (ref 8.5–10.1)
CEA SERPL-MCNC: 7.7 NG/ML
CHLORIDE SERPL-SCNC: 107 MMOL/L (ref 98–107)
CO2 SERPL-SCNC: 27 MMOL/L (ref 21–32)
CREAT SERPL-MCNC: 1.3 MG/DL (ref 0.6–1.3)
EOSINOPHIL # BLD AUTO: 0 X10(3)/MCL (ref 0–0.9)
EOSINOPHIL NFR BLD AUTO: 0 %
ERYTHROCYTE [DISTWIDTH] IN BLOOD BY AUTOMATED COUNT: 16.2 % (ref 11.5–14.5)
GLOBULIN SER-MCNC: 3.8 GM/ML (ref 2.3–3.5)
GLUCOSE SERPL-MCNC: 88 MG/DL (ref 74–106)
HCT VFR BLD AUTO: 38.5 % (ref 40–51)
HGB BLD-MCNC: 12 GM/DL (ref 13.5–17.5)
IMM GRANULOCYTES # BLD AUTO: 0.01 10*3/UL
IMM GRANULOCYTES NFR BLD AUTO: 0 %
LYMPHOCYTES # BLD AUTO: 1.7 X10(3)/MCL (ref 0.6–4.6)
LYMPHOCYTES NFR BLD AUTO: 44 %
MAGNESIUM SERPL-MCNC: 1.9 MG/DL (ref 1.6–2.6)
MCH RBC QN AUTO: 28.1 PG (ref 26–34)
MCHC RBC AUTO-ENTMCNC: 31.2 GM/DL (ref 31–37)
MCV RBC AUTO: 90.2 FL (ref 80–100)
MONOCYTES # BLD AUTO: 0.4 X10(3)/MCL (ref 0.1–1.3)
MONOCYTES NFR BLD AUTO: 9 %
NEUTROPHILS # BLD AUTO: 1.7 X10(3)/MCL (ref 2.1–9.2)
NEUTROPHILS NFR BLD AUTO: 45 %
PLATELET # BLD AUTO: 118 X10(3)/MCL (ref 130–400)
PMV BLD AUTO: 10 FL (ref 7.4–10.4)
POTASSIUM SERPL-SCNC: 4 MMOL/L (ref 3.5–5.1)
PROT SERPL-MCNC: 7.2 GM/DL (ref 6.4–8.2)
PROT UR STRIP-MCNC: <5 MG/DL
RBC # BLD AUTO: 4.27 X10(6)/MCL (ref 4.5–5.9)
SODIUM SERPL-SCNC: 141 MMOL/L (ref 136–145)
WBC # SPEC AUTO: 3.8 X10(3)/MCL (ref 4.5–11)

## 2019-09-18 ENCOUNTER — HISTORICAL (OUTPATIENT)
Dept: INFUSION THERAPY | Facility: HOSPITAL | Age: 39
End: 2019-09-18

## 2019-09-20 ENCOUNTER — HISTORICAL (OUTPATIENT)
Dept: INFUSION THERAPY | Facility: HOSPITAL | Age: 39
End: 2019-09-20

## 2019-10-02 ENCOUNTER — HISTORICAL (OUTPATIENT)
Dept: INFUSION THERAPY | Facility: HOSPITAL | Age: 39
End: 2019-10-02

## 2019-10-02 LAB
ABS NEUT (OLG): 1.31 X10(3)/MCL (ref 2.1–9.2)
ALBUMIN SERPL-MCNC: 3.2 GM/DL (ref 3.4–5)
ALBUMIN/GLOB SERPL: 0.8 RATIO (ref 1.1–2)
ALP SERPL-CCNC: 74 UNIT/L (ref 45–117)
ALT SERPL-CCNC: 29 UNIT/L (ref 12–78)
AST SERPL-CCNC: 33 UNIT/L (ref 15–37)
BASOPHILS # BLD AUTO: 0 X10(3)/MCL (ref 0–0.2)
BASOPHILS NFR BLD AUTO: 1 %
BILIRUB SERPL-MCNC: 0.4 MG/DL (ref 0.2–1)
BILIRUBIN DIRECT+TOT PNL SERPL-MCNC: 0.1 MG/DL (ref 0–0.2)
BILIRUBIN DIRECT+TOT PNL SERPL-MCNC: 0.3 MG/DL
BUN SERPL-MCNC: 8 MG/DL (ref 7–18)
CALCIUM SERPL-MCNC: 8.7 MG/DL (ref 8.5–10.1)
CEA SERPL-MCNC: 8 NG/ML
CHLORIDE SERPL-SCNC: 109 MMOL/L (ref 98–107)
CO2 SERPL-SCNC: 30 MMOL/L (ref 21–32)
CREAT SERPL-MCNC: 1.4 MG/DL (ref 0.6–1.3)
EOSINOPHIL # BLD AUTO: 0 X10(3)/MCL (ref 0–0.9)
EOSINOPHIL NFR BLD AUTO: 1 %
ERYTHROCYTE [DISTWIDTH] IN BLOOD BY AUTOMATED COUNT: 15.8 % (ref 11.5–14.5)
GLOBULIN SER-MCNC: 3.9 GM/ML (ref 2.3–3.5)
GLUCOSE SERPL-MCNC: 106 MG/DL (ref 74–106)
HCT VFR BLD AUTO: 36.7 % (ref 40–51)
HGB BLD-MCNC: 11.5 GM/DL (ref 13.5–17.5)
LYMPHOCYTES # BLD AUTO: 1.6 X10(3)/MCL (ref 0.6–4.6)
LYMPHOCYTES NFR BLD AUTO: 48 %
MAGNESIUM SERPL-MCNC: 1.9 MG/DL (ref 1.6–2.6)
MCH RBC QN AUTO: 28 PG (ref 26–34)
MCHC RBC AUTO-ENTMCNC: 31.3 GM/DL (ref 31–37)
MCV RBC AUTO: 89.3 FL (ref 80–100)
MONOCYTES # BLD AUTO: 0.4 X10(3)/MCL (ref 0.1–1.3)
MONOCYTES NFR BLD AUTO: 11 %
NEUTROPHILS # BLD AUTO: 1.31 X10(3)/MCL (ref 2.1–9.2)
NEUTROPHILS NFR BLD AUTO: 40 %
PLATELET # BLD AUTO: 120 X10(3)/MCL (ref 130–400)
PMV BLD AUTO: 11 FL (ref 7.4–10.4)
POTASSIUM SERPL-SCNC: 3.7 MMOL/L (ref 3.5–5.1)
PROT SERPL-MCNC: 7.1 GM/DL (ref 6.4–8.2)
PROT UR STRIP-MCNC: 7.1 MG/DL
RBC # BLD AUTO: 4.11 X10(6)/MCL (ref 4.5–5.9)
SODIUM SERPL-SCNC: 142 MMOL/L (ref 136–145)
WBC # SPEC AUTO: 3.3 X10(3)/MCL (ref 4.5–11)

## 2019-10-04 ENCOUNTER — HISTORICAL (OUTPATIENT)
Dept: INFUSION THERAPY | Facility: HOSPITAL | Age: 39
End: 2019-10-04

## 2019-10-08 ENCOUNTER — HISTORICAL (OUTPATIENT)
Dept: RADIOLOGY | Facility: HOSPITAL | Age: 39
End: 2019-10-08

## 2019-10-15 ENCOUNTER — HISTORICAL (OUTPATIENT)
Dept: ADMINISTRATIVE | Facility: HOSPITAL | Age: 39
End: 2019-10-15

## 2019-10-15 LAB
ABS NEUT (OLG): 1.29 X10(3)/MCL (ref 2.1–9.2)
ALBUMIN SERPL-MCNC: 3.3 GM/DL (ref 3.4–5)
ALBUMIN/GLOB SERPL: 0.8 RATIO (ref 1.1–2)
ALP SERPL-CCNC: 74 UNIT/L (ref 45–117)
ALT SERPL-CCNC: 41 UNIT/L (ref 12–78)
AST SERPL-CCNC: 43 UNIT/L (ref 15–37)
BASOPHILS # BLD AUTO: 0 X10(3)/MCL (ref 0–0.2)
BASOPHILS NFR BLD AUTO: 0 %
BILIRUB SERPL-MCNC: 0.5 MG/DL (ref 0.2–1)
BILIRUBIN DIRECT+TOT PNL SERPL-MCNC: 0.1 MG/DL (ref 0–0.2)
BILIRUBIN DIRECT+TOT PNL SERPL-MCNC: 0.4 MG/DL
BUN SERPL-MCNC: 7 MG/DL (ref 7–18)
CALCIUM SERPL-MCNC: 8.6 MG/DL (ref 8.5–10.1)
CEA SERPL-MCNC: 9.4 NG/ML
CHLORIDE SERPL-SCNC: 105 MMOL/L (ref 98–107)
CO2 SERPL-SCNC: 30 MMOL/L (ref 21–32)
CREAT SERPL-MCNC: 1.4 MG/DL (ref 0.6–1.3)
EOSINOPHIL # BLD AUTO: 0 X10(3)/MCL (ref 0–0.9)
EOSINOPHIL NFR BLD AUTO: 1 %
ERYTHROCYTE [DISTWIDTH] IN BLOOD BY AUTOMATED COUNT: 15.5 % (ref 11.5–14.5)
GLOBULIN SER-MCNC: 4.1 GM/ML (ref 2.3–3.5)
GLUCOSE SERPL-MCNC: 92 MG/DL (ref 74–106)
HCT VFR BLD AUTO: 39.2 % (ref 40–51)
HGB BLD-MCNC: 12.4 GM/DL (ref 13.5–17.5)
IMM GRANULOCYTES # BLD AUTO: 0.01 10*3/UL
IMM GRANULOCYTES NFR BLD AUTO: 0 %
LYMPHOCYTES # BLD AUTO: 1.5 X10(3)/MCL (ref 0.6–4.6)
LYMPHOCYTES NFR BLD AUTO: 48 %
MAGNESIUM SERPL-MCNC: 1.8 MG/DL (ref 1.6–2.6)
MCH RBC QN AUTO: 28.5 PG (ref 26–34)
MCHC RBC AUTO-ENTMCNC: 31.6 GM/DL (ref 31–37)
MCV RBC AUTO: 90.1 FL (ref 80–100)
MONOCYTES # BLD AUTO: 0.3 X10(3)/MCL (ref 0.1–1.3)
MONOCYTES NFR BLD AUTO: 10 %
NEUTROPHILS # BLD AUTO: 1.29 X10(3)/MCL (ref 2.1–9.2)
NEUTROPHILS NFR BLD AUTO: 41 %
PLATELET # BLD AUTO: 109 X10(3)/MCL (ref 130–400)
PMV BLD AUTO: 9.7 FL (ref 7.4–10.4)
POTASSIUM SERPL-SCNC: 3.2 MMOL/L (ref 3.5–5.1)
PROT SERPL-MCNC: 7.4 GM/DL (ref 6.4–8.2)
PROT UR STRIP-MCNC: 9.3 MG/DL
RBC # BLD AUTO: 4.35 X10(6)/MCL (ref 4.5–5.9)
SODIUM SERPL-SCNC: 139 MMOL/L (ref 136–145)
WBC # SPEC AUTO: 3.2 X10(3)/MCL (ref 4.5–11)

## 2019-10-16 ENCOUNTER — HISTORICAL (OUTPATIENT)
Dept: INFUSION THERAPY | Facility: HOSPITAL | Age: 39
End: 2019-10-16

## 2019-10-18 ENCOUNTER — HISTORICAL (OUTPATIENT)
Dept: INFUSION THERAPY | Facility: HOSPITAL | Age: 39
End: 2019-10-18

## 2019-10-31 ENCOUNTER — HISTORICAL (OUTPATIENT)
Dept: ENDOSCOPY | Facility: HOSPITAL | Age: 39
End: 2019-10-31

## 2019-11-04 ENCOUNTER — HISTORICAL (OUTPATIENT)
Dept: ADMINISTRATIVE | Facility: HOSPITAL | Age: 39
End: 2019-11-04

## 2019-11-04 LAB
ABS NEUT (OLG): 1.04 X10(3)/MCL (ref 2.1–9.2)
ALBUMIN SERPL-MCNC: 3.4 GM/DL (ref 3.4–5)
ALBUMIN/GLOB SERPL: 0.8 RATIO (ref 1.1–2)
ALP SERPL-CCNC: 94 UNIT/L (ref 45–117)
ALT SERPL-CCNC: 71 UNIT/L (ref 12–78)
AST SERPL-CCNC: 61 UNIT/L (ref 15–37)
BASOPHILS # BLD AUTO: 0 X10(3)/MCL (ref 0–0.2)
BASOPHILS NFR BLD AUTO: 1 %
BILIRUB SERPL-MCNC: 0.6 MG/DL (ref 0.2–1)
BILIRUBIN DIRECT+TOT PNL SERPL-MCNC: 0.2 MG/DL (ref 0–0.2)
BILIRUBIN DIRECT+TOT PNL SERPL-MCNC: 0.4 MG/DL
BUN SERPL-MCNC: 11 MG/DL (ref 7–18)
CALCIUM SERPL-MCNC: 9.1 MG/DL (ref 8.5–10.1)
CHLORIDE SERPL-SCNC: 104 MMOL/L (ref 98–107)
CO2 SERPL-SCNC: 32 MMOL/L (ref 21–32)
CREAT SERPL-MCNC: 1.4 MG/DL (ref 0.6–1.3)
EOSINOPHIL # BLD AUTO: 0 X10(3)/MCL (ref 0–0.9)
EOSINOPHIL NFR BLD AUTO: 1 %
ERYTHROCYTE [DISTWIDTH] IN BLOOD BY AUTOMATED COUNT: 16.7 % (ref 11.5–14.5)
GLOBULIN SER-MCNC: 4.1 GM/ML (ref 2.3–3.5)
GLUCOSE SERPL-MCNC: 87 MG/DL (ref 74–106)
HCT VFR BLD AUTO: 40.5 % (ref 40–51)
HGB BLD-MCNC: 12.8 GM/DL (ref 13.5–17.5)
IMM GRANULOCYTES # BLD AUTO: 0.01 10*3/UL
IMM GRANULOCYTES NFR BLD AUTO: 0 %
LYMPHOCYTES # BLD AUTO: 1.8 X10(3)/MCL (ref 0.6–4.6)
LYMPHOCYTES NFR BLD AUTO: 55 %
MCH RBC QN AUTO: 29 PG (ref 26–34)
MCHC RBC AUTO-ENTMCNC: 31.6 GM/DL (ref 31–37)
MCV RBC AUTO: 91.8 FL (ref 80–100)
MONOCYTES # BLD AUTO: 0.4 X10(3)/MCL (ref 0.1–1.3)
MONOCYTES NFR BLD AUTO: 11 %
NEUTROPHILS # BLD AUTO: 1.04 X10(3)/MCL (ref 2.1–9.2)
NEUTROPHILS NFR BLD AUTO: 32 %
PLATELET # BLD AUTO: 146 X10(3)/MCL (ref 130–400)
PMV BLD AUTO: 10.6 FL (ref 7.4–10.4)
POTASSIUM SERPL-SCNC: 3.8 MMOL/L (ref 3.5–5.1)
PROT SERPL-MCNC: 7.5 GM/DL (ref 6.4–8.2)
PROT UR STRIP-MCNC: 9.8 MG/DL
RBC # BLD AUTO: 4.41 X10(6)/MCL (ref 4.5–5.9)
SODIUM SERPL-SCNC: 140 MMOL/L (ref 136–145)
WBC # SPEC AUTO: 3.2 X10(3)/MCL (ref 4.5–11)

## 2019-11-11 ENCOUNTER — HISTORICAL (OUTPATIENT)
Dept: ADMINISTRATIVE | Facility: HOSPITAL | Age: 39
End: 2019-11-11

## 2019-11-11 LAB
ABS NEUT (OLG): 1.44 X10(3)/MCL (ref 2.1–9.2)
ALBUMIN SERPL-MCNC: 3.3 GM/DL (ref 3.4–5)
ALBUMIN/GLOB SERPL: 0.8 RATIO (ref 1.1–2)
ALP SERPL-CCNC: 112 UNIT/L (ref 45–117)
ALT SERPL-CCNC: 101 UNIT/L (ref 12–78)
AST SERPL-CCNC: 76 UNIT/L (ref 15–37)
BASOPHILS # BLD AUTO: 0 X10(3)/MCL (ref 0–0.2)
BASOPHILS NFR BLD AUTO: 1 %
BILIRUB SERPL-MCNC: 0.4 MG/DL (ref 0.2–1)
BILIRUBIN DIRECT+TOT PNL SERPL-MCNC: 0.1 MG/DL (ref 0–0.2)
BILIRUBIN DIRECT+TOT PNL SERPL-MCNC: 0.3 MG/DL
BUN SERPL-MCNC: 13 MG/DL (ref 7–18)
CALCIUM SERPL-MCNC: 8.8 MG/DL (ref 8.5–10.1)
CEA SERPL-MCNC: 11.2 NG/ML
CHLORIDE SERPL-SCNC: 105 MMOL/L (ref 98–107)
CO2 SERPL-SCNC: 31 MMOL/L (ref 21–32)
CREAT SERPL-MCNC: 1.3 MG/DL (ref 0.6–1.3)
EOSINOPHIL # BLD AUTO: 0 X10(3)/MCL (ref 0–0.9)
EOSINOPHIL NFR BLD AUTO: 1 %
ERYTHROCYTE [DISTWIDTH] IN BLOOD BY AUTOMATED COUNT: 16.9 % (ref 11.5–14.5)
GLOBULIN SER-MCNC: 4.1 GM/ML (ref 2.3–3.5)
GLUCOSE SERPL-MCNC: 106 MG/DL (ref 74–106)
HCT VFR BLD AUTO: 40.1 % (ref 40–51)
HGB BLD-MCNC: 12.3 GM/DL (ref 13.5–17.5)
IMM GRANULOCYTES # BLD AUTO: 0.01 10*3/UL
IMM GRANULOCYTES NFR BLD AUTO: 0 %
LYMPHOCYTES # BLD AUTO: 1.5 X10(3)/MCL (ref 0.6–4.6)
LYMPHOCYTES NFR BLD AUTO: 46 %
MAGNESIUM SERPL-MCNC: 2 MG/DL (ref 1.6–2.6)
MCH RBC QN AUTO: 28 PG (ref 26–34)
MCHC RBC AUTO-ENTMCNC: 30.7 GM/DL (ref 31–37)
MCV RBC AUTO: 91.3 FL (ref 80–100)
MONOCYTES # BLD AUTO: 0.3 X10(3)/MCL (ref 0.1–1.3)
MONOCYTES NFR BLD AUTO: 10 %
NEUTROPHILS # BLD AUTO: 1.44 X10(3)/MCL (ref 2.1–9.2)
NEUTROPHILS NFR BLD AUTO: 43 %
PLATELET # BLD AUTO: 141 X10(3)/MCL (ref 130–400)
PMV BLD AUTO: 10.6 FL (ref 7.4–10.4)
POTASSIUM SERPL-SCNC: 4.1 MMOL/L (ref 3.5–5.1)
PROT SERPL-MCNC: 7.4 GM/DL (ref 6.4–8.2)
PROT UR STRIP-MCNC: 11.2 MG/DL
RBC # BLD AUTO: 4.39 X10(6)/MCL (ref 4.5–5.9)
SODIUM SERPL-SCNC: 142 MMOL/L (ref 136–145)
WBC # SPEC AUTO: 3.4 X10(3)/MCL (ref 4.5–11)

## 2019-11-14 ENCOUNTER — HISTORICAL (OUTPATIENT)
Dept: ENDOSCOPY | Facility: HOSPITAL | Age: 39
End: 2019-11-14

## 2019-11-19 ENCOUNTER — HISTORICAL (OUTPATIENT)
Dept: INFUSION THERAPY | Facility: HOSPITAL | Age: 39
End: 2019-11-19

## 2019-11-19 LAB
ABS NEUT (OLG): 1.51 X10(3)/MCL (ref 2.1–9.2)
ALBUMIN SERPL-MCNC: 3.5 GM/DL (ref 3.4–5)
ALBUMIN/GLOB SERPL: 0.9 RATIO (ref 1.1–2)
ALP SERPL-CCNC: 109 UNIT/L (ref 45–117)
ALT SERPL-CCNC: 78 UNIT/L (ref 12–78)
AST SERPL-CCNC: 76 UNIT/L (ref 15–37)
BASOPHILS # BLD AUTO: 0 X10(3)/MCL (ref 0–0.2)
BASOPHILS NFR BLD AUTO: 0 %
BILIRUB SERPL-MCNC: 0.5 MG/DL (ref 0.2–1)
BUN SERPL-MCNC: 14 MG/DL (ref 7–18)
CALCIUM SERPL-MCNC: 8.9 MG/DL (ref 8.5–10.1)
CHLORIDE SERPL-SCNC: 106 MMOL/L (ref 98–107)
CO2 SERPL-SCNC: 30 MMOL/L (ref 21–32)
CREAT SERPL-MCNC: 1.4 MG/DL (ref 0.6–1.3)
EOSINOPHIL # BLD AUTO: 0 X10(3)/MCL (ref 0–0.9)
EOSINOPHIL NFR BLD AUTO: 1 %
ERYTHROCYTE [DISTWIDTH] IN BLOOD BY AUTOMATED COUNT: 16.4 % (ref 11.5–14.5)
GLOBULIN SER-MCNC: 4 GM/ML (ref 2.3–3.5)
GLUCOSE SERPL-MCNC: 85 MG/DL (ref 74–106)
HCT VFR BLD AUTO: 41.7 % (ref 40–51)
HGB BLD-MCNC: 12.9 GM/DL (ref 13.5–17.5)
IMM GRANULOCYTES # BLD AUTO: 0.01 10*3/UL
IMM GRANULOCYTES NFR BLD AUTO: 0 %
LYMPHOCYTES # BLD AUTO: 1.5 X10(3)/MCL (ref 0.6–4.6)
LYMPHOCYTES NFR BLD AUTO: 44 %
MAGNESIUM SERPL-MCNC: 1.8 MG/DL (ref 1.6–2.6)
MCH RBC QN AUTO: 28.7 PG (ref 26–34)
MCHC RBC AUTO-ENTMCNC: 30.9 GM/DL (ref 31–37)
MCV RBC AUTO: 92.9 FL (ref 80–100)
MONOCYTES # BLD AUTO: 0.3 X10(3)/MCL (ref 0.1–1.3)
MONOCYTES NFR BLD AUTO: 8 %
NEUTROPHILS # BLD AUTO: 1.51 X10(3)/MCL (ref 2.1–9.2)
NEUTROPHILS NFR BLD AUTO: 46 %
PLATELET # BLD AUTO: 157 X10(3)/MCL (ref 130–400)
PMV BLD AUTO: 10.1 FL (ref 7.4–10.4)
POTASSIUM SERPL-SCNC: 4.2 MMOL/L (ref 3.5–5.1)
PROT SERPL-MCNC: 7.5 GM/DL (ref 6.4–8.2)
PROT UR STRIP-MCNC: <5 MG/DL
RBC # BLD AUTO: 4.49 X10(6)/MCL (ref 4.5–5.9)
SODIUM SERPL-SCNC: 140 MMOL/L (ref 136–145)
WBC # SPEC AUTO: 3.3 X10(3)/MCL (ref 4.5–11)

## 2019-11-21 ENCOUNTER — HISTORICAL (OUTPATIENT)
Dept: INFUSION THERAPY | Facility: HOSPITAL | Age: 39
End: 2019-11-21

## 2019-12-02 ENCOUNTER — HISTORICAL (OUTPATIENT)
Dept: ADMINISTRATIVE | Facility: HOSPITAL | Age: 39
End: 2019-12-02

## 2019-12-02 LAB
ABS NEUT (OLG): 1.78 X10(3)/MCL (ref 2.1–9.2)
ALBUMIN SERPL-MCNC: 3.4 GM/DL (ref 3.4–5)
ALBUMIN/GLOB SERPL: 0.9 RATIO (ref 1.1–2)
ALP SERPL-CCNC: 94 UNIT/L (ref 45–117)
ALT SERPL-CCNC: 41 UNIT/L (ref 12–78)
AST SERPL-CCNC: 38 UNIT/L (ref 15–37)
BASOPHILS # BLD AUTO: 0 X10(3)/MCL (ref 0–0.2)
BASOPHILS NFR BLD AUTO: 0 %
BILIRUB SERPL-MCNC: 0.5 MG/DL (ref 0.2–1)
BILIRUBIN DIRECT+TOT PNL SERPL-MCNC: 0.1 MG/DL (ref 0–0.2)
BILIRUBIN DIRECT+TOT PNL SERPL-MCNC: 0.4 MG/DL
BUN SERPL-MCNC: 12 MG/DL (ref 7–18)
CALCIUM SERPL-MCNC: 8.9 MG/DL (ref 8.5–10.1)
CEA SERPL-MCNC: 14 NG/ML
CHLORIDE SERPL-SCNC: 106 MMOL/L (ref 98–107)
CO2 SERPL-SCNC: 30 MMOL/L (ref 21–32)
CREAT SERPL-MCNC: 1.4 MG/DL (ref 0.6–1.3)
EOSINOPHIL # BLD AUTO: 0 X10(3)/MCL (ref 0–0.9)
EOSINOPHIL NFR BLD AUTO: 1 %
ERYTHROCYTE [DISTWIDTH] IN BLOOD BY AUTOMATED COUNT: 15.7 % (ref 11.5–14.5)
GLOBULIN SER-MCNC: 3.9 GM/ML (ref 2.3–3.5)
GLUCOSE SERPL-MCNC: 85 MG/DL (ref 74–106)
HCT VFR BLD AUTO: 39.3 % (ref 40–51)
HGB BLD-MCNC: 12.3 GM/DL (ref 13.5–17.5)
IMM GRANULOCYTES # BLD AUTO: 0.02 10*3/UL
IMM GRANULOCYTES NFR BLD AUTO: 0 %
LYMPHOCYTES # BLD AUTO: 1.5 X10(3)/MCL (ref 0.6–4.6)
LYMPHOCYTES NFR BLD AUTO: 41 %
MAGNESIUM SERPL-MCNC: 2 MG/DL (ref 1.6–2.6)
MCH RBC QN AUTO: 28.3 PG (ref 26–34)
MCHC RBC AUTO-ENTMCNC: 31.3 GM/DL (ref 31–37)
MCV RBC AUTO: 90.6 FL (ref 80–100)
MONOCYTES # BLD AUTO: 0.3 X10(3)/MCL (ref 0.1–1.3)
MONOCYTES NFR BLD AUTO: 9 %
NEUTROPHILS # BLD AUTO: 1.78 X10(3)/MCL (ref 2.1–9.2)
NEUTROPHILS NFR BLD AUTO: 48 %
PLATELET # BLD AUTO: 137 X10(3)/MCL (ref 130–400)
PMV BLD AUTO: 10 FL (ref 7.4–10.4)
POTASSIUM SERPL-SCNC: 3.8 MMOL/L (ref 3.5–5.1)
PROT SERPL-MCNC: 7.3 GM/DL (ref 6.4–8.2)
PROT UR STRIP-MCNC: 5.9 MG/DL
RBC # BLD AUTO: 4.34 X10(6)/MCL (ref 4.5–5.9)
SODIUM SERPL-SCNC: 142 MMOL/L (ref 136–145)
WBC # SPEC AUTO: 3.7 X10(3)/MCL (ref 4.5–11)

## 2019-12-03 ENCOUNTER — HISTORICAL (OUTPATIENT)
Dept: INFUSION THERAPY | Facility: HOSPITAL | Age: 39
End: 2019-12-03

## 2019-12-05 ENCOUNTER — HISTORICAL (OUTPATIENT)
Dept: INFUSION THERAPY | Facility: HOSPITAL | Age: 39
End: 2019-12-05

## 2019-12-16 ENCOUNTER — HISTORICAL (OUTPATIENT)
Dept: ADMINISTRATIVE | Facility: HOSPITAL | Age: 39
End: 2019-12-16

## 2019-12-16 LAB
ABS NEUT (OLG): 1.92 X10(3)/MCL (ref 2.1–9.2)
ALBUMIN SERPL-MCNC: 3.6 GM/DL (ref 3.4–5)
ALBUMIN/GLOB SERPL: 0.9 RATIO (ref 1.1–2)
ALP SERPL-CCNC: 84 UNIT/L (ref 45–117)
ALT SERPL-CCNC: 31 UNIT/L (ref 12–78)
AST SERPL-CCNC: 36 UNIT/L (ref 15–37)
BASOPHILS # BLD AUTO: 0 X10(3)/MCL (ref 0–0.2)
BASOPHILS NFR BLD AUTO: 0 %
BILIRUB SERPL-MCNC: 0.3 MG/DL (ref 0.2–1)
BILIRUBIN DIRECT+TOT PNL SERPL-MCNC: <0.1 MG/DL (ref 0–0.2)
BILIRUBIN DIRECT+TOT PNL SERPL-MCNC: ABNORMAL MG/DL
BUN SERPL-MCNC: 17 MG/DL (ref 7–18)
CALCIUM SERPL-MCNC: 9.5 MG/DL (ref 8.5–10.1)
CHLORIDE SERPL-SCNC: 106 MMOL/L (ref 98–107)
CHOLEST SERPL-MCNC: 228 MG/DL
CHOLEST/HDLC SERPL: 4.3 {RATIO} (ref 0–5)
CO2 SERPL-SCNC: 28 MMOL/L (ref 21–32)
CREAT SERPL-MCNC: 1.7 MG/DL (ref 0.6–1.3)
EOSINOPHIL # BLD AUTO: 0 X10(3)/MCL (ref 0–0.9)
EOSINOPHIL NFR BLD AUTO: 0 %
ERYTHROCYTE [DISTWIDTH] IN BLOOD BY AUTOMATED COUNT: 15.6 % (ref 11.5–14.5)
EST. AVERAGE GLUCOSE BLD GHB EST-MCNC: 105 MG/DL
GLOBULIN SER-MCNC: 4.1 GM/ML (ref 2.3–3.5)
GLUCOSE SERPL-MCNC: 86 MG/DL (ref 74–106)
HBA1C MFR BLD: 5.3 % (ref 4.2–6.3)
HCT VFR BLD AUTO: 40 % (ref 40–51)
HDLC SERPL-MCNC: 53 MG/DL (ref 40–59)
HGB BLD-MCNC: 12.4 GM/DL (ref 13.5–17.5)
HIV 1+2 AB+HIV1 P24 AG SERPL QL IA: NONREACTIVE
IMM GRANULOCYTES # BLD AUTO: 0.01 10*3/UL
IMM GRANULOCYTES NFR BLD AUTO: 0 %
LDLC SERPL CALC-MCNC: 121 MG/DL
LYMPHOCYTES # BLD AUTO: 2.1 X10(3)/MCL (ref 0.6–4.6)
LYMPHOCYTES NFR BLD AUTO: 45 %
MCH RBC QN AUTO: 28.2 PG (ref 26–34)
MCHC RBC AUTO-ENTMCNC: 31 GM/DL (ref 31–37)
MCV RBC AUTO: 91.1 FL (ref 80–100)
MONOCYTES # BLD AUTO: 0.6 X10(3)/MCL (ref 0.1–1.3)
MONOCYTES NFR BLD AUTO: 12 %
NEUTROPHILS # BLD AUTO: 1.92 X10(3)/MCL (ref 2.1–9.2)
NEUTROPHILS NFR BLD AUTO: 41 %
PLATELET # BLD AUTO: 145 X10(3)/MCL (ref 130–400)
PMV BLD AUTO: 10.7 FL (ref 7.4–10.4)
POTASSIUM SERPL-SCNC: 3.8 MMOL/L (ref 3.5–5.1)
PROT SERPL-MCNC: 7.7 GM/DL (ref 6.4–8.2)
PROT UR STRIP-MCNC: 10.4 MG/DL
PSA SERPL-MCNC: 1.4 NG/ML
RBC # BLD AUTO: 4.39 X10(6)/MCL (ref 4.5–5.9)
SODIUM SERPL-SCNC: 141 MMOL/L (ref 136–145)
T4 FREE SERPL-MCNC: 0.64 NG/DL (ref 0.76–1.46)
TRIGL SERPL-MCNC: 268 MG/DL
TSH SERPL-ACNC: 8.69 MIU/L (ref 0.36–3.74)
VLDLC SERPL CALC-MCNC: 54 MG/DL
WBC # SPEC AUTO: 4.6 X10(3)/MCL (ref 4.5–11)

## 2019-12-17 ENCOUNTER — HISTORICAL (OUTPATIENT)
Dept: INFUSION THERAPY | Facility: HOSPITAL | Age: 39
End: 2019-12-17

## 2019-12-19 ENCOUNTER — HISTORICAL (OUTPATIENT)
Dept: INFUSION THERAPY | Facility: HOSPITAL | Age: 39
End: 2019-12-19

## 2019-12-31 ENCOUNTER — HISTORICAL (OUTPATIENT)
Dept: INFUSION THERAPY | Facility: HOSPITAL | Age: 39
End: 2019-12-31

## 2019-12-31 LAB
ABS NEUT (OLG): 1.39 X10(3)/MCL (ref 2.1–9.2)
ALBUMIN SERPL-MCNC: 3.1 GM/DL (ref 3.4–5)
ALBUMIN/GLOB SERPL: 0.8 RATIO (ref 1.1–2)
ALP SERPL-CCNC: 74 UNIT/L (ref 45–117)
ALT SERPL-CCNC: 31 UNIT/L (ref 12–78)
AST SERPL-CCNC: 42 UNIT/L (ref 15–37)
BASOPHILS # BLD AUTO: 0 X10(3)/MCL (ref 0–0.2)
BASOPHILS NFR BLD AUTO: 0 %
BILIRUB SERPL-MCNC: 0.4 MG/DL (ref 0.2–1)
BILIRUBIN DIRECT+TOT PNL SERPL-MCNC: <0.1 MG/DL (ref 0–0.2)
BILIRUBIN DIRECT+TOT PNL SERPL-MCNC: >0.3 MG/DL
BUN SERPL-MCNC: 15 MG/DL (ref 7–18)
CALCIUM SERPL-MCNC: 8.9 MG/DL (ref 8.5–10.1)
CHLORIDE SERPL-SCNC: 107 MMOL/L (ref 98–107)
CO2 SERPL-SCNC: 30 MMOL/L (ref 21–32)
CREAT SERPL-MCNC: 1.4 MG/DL (ref 0.6–1.3)
EOSINOPHIL # BLD AUTO: 0 X10(3)/MCL (ref 0–0.9)
EOSINOPHIL NFR BLD AUTO: 1 %
ERYTHROCYTE [DISTWIDTH] IN BLOOD BY AUTOMATED COUNT: 15.1 % (ref 11.5–14.5)
GLOBULIN SER-MCNC: 3.7 GM/ML (ref 2.3–3.5)
GLUCOSE SERPL-MCNC: 96 MG/DL (ref 74–106)
HCT VFR BLD AUTO: 38.3 % (ref 40–51)
HGB BLD-MCNC: 12.1 GM/DL (ref 13.5–17.5)
IMM GRANULOCYTES # BLD AUTO: 0.01 10*3/UL
IMM GRANULOCYTES NFR BLD AUTO: 0 %
LYMPHOCYTES # BLD AUTO: 1.6 X10(3)/MCL (ref 0.6–4.6)
LYMPHOCYTES NFR BLD AUTO: 47 %
MAGNESIUM SERPL-MCNC: 1.8 MG/DL (ref 1.6–2.6)
MCH RBC QN AUTO: 28.4 PG (ref 26–34)
MCHC RBC AUTO-ENTMCNC: 31.6 GM/DL (ref 31–37)
MCV RBC AUTO: 89.9 FL (ref 80–100)
MONOCYTES # BLD AUTO: 0.4 X10(3)/MCL (ref 0.1–1.3)
MONOCYTES NFR BLD AUTO: 12 %
NEUTROPHILS # BLD AUTO: 1.39 X10(3)/MCL (ref 2.1–9.2)
NEUTROPHILS NFR BLD AUTO: 40 %
PLATELET # BLD AUTO: 110 X10(3)/MCL (ref 130–400)
PMV BLD AUTO: 9.4 FL (ref 7.4–10.4)
POTASSIUM SERPL-SCNC: 4.3 MMOL/L (ref 3.5–5.1)
PROT SERPL-MCNC: 6.8 GM/DL (ref 6.4–8.2)
PROT UR STRIP-MCNC: 7.3 MG/DL
RBC # BLD AUTO: 4.26 X10(6)/MCL (ref 4.5–5.9)
SODIUM SERPL-SCNC: 140 MMOL/L (ref 136–145)
WBC # SPEC AUTO: 3.5 X10(3)/MCL (ref 4.5–11)

## 2020-01-07 ENCOUNTER — HISTORICAL (OUTPATIENT)
Dept: INFUSION THERAPY | Facility: HOSPITAL | Age: 40
End: 2020-01-07

## 2020-01-07 LAB
ABS NEUT (OLG): 1.2 X10(3)/MCL (ref 2.1–9.2)
ALBUMIN SERPL-MCNC: 3.2 GM/DL (ref 3.4–5)
ALBUMIN/GLOB SERPL: 0.8 RATIO (ref 1.1–2)
ALP SERPL-CCNC: 80 UNIT/L (ref 45–117)
ALT SERPL-CCNC: 54 UNIT/L (ref 12–78)
AST SERPL-CCNC: 57 UNIT/L (ref 15–37)
BASOPHILS # BLD AUTO: 0 X10(3)/MCL (ref 0–0.2)
BASOPHILS NFR BLD AUTO: 1 %
BILIRUB SERPL-MCNC: 0.3 MG/DL (ref 0.2–1)
BILIRUBIN DIRECT+TOT PNL SERPL-MCNC: <0.1 MG/DL (ref 0–0.2)
BILIRUBIN DIRECT+TOT PNL SERPL-MCNC: ABNORMAL MG/DL
BUN SERPL-MCNC: 12 MG/DL (ref 7–18)
CALCIUM SERPL-MCNC: 9.1 MG/DL (ref 8.5–10.1)
CHLORIDE SERPL-SCNC: 106 MMOL/L (ref 98–107)
CO2 SERPL-SCNC: 31 MMOL/L (ref 21–32)
CREAT SERPL-MCNC: 1.4 MG/DL (ref 0.6–1.3)
EOSINOPHIL # BLD AUTO: 0 X10(3)/MCL (ref 0–0.9)
EOSINOPHIL NFR BLD AUTO: 1 %
ERYTHROCYTE [DISTWIDTH] IN BLOOD BY AUTOMATED COUNT: 16.2 % (ref 11.5–14.5)
GLOBULIN SER-MCNC: 3.9 GM/ML (ref 2.3–3.5)
GLUCOSE SERPL-MCNC: 85 MG/DL (ref 74–106)
HCT VFR BLD AUTO: 38.3 % (ref 40–51)
HGB BLD-MCNC: 11.7 GM/DL (ref 13.5–17.5)
LYMPHOCYTES # BLD AUTO: 1.7 X10(3)/MCL (ref 0.6–4.6)
LYMPHOCYTES NFR BLD AUTO: 50 %
MCH RBC QN AUTO: 28.1 PG (ref 26–34)
MCHC RBC AUTO-ENTMCNC: 30.5 GM/DL (ref 31–37)
MCV RBC AUTO: 92.1 FL (ref 80–100)
MONOCYTES # BLD AUTO: 0.4 X10(3)/MCL (ref 0.1–1.3)
MONOCYTES NFR BLD AUTO: 13 %
NEUTROPHILS # BLD AUTO: 1.2 X10(3)/MCL (ref 2.1–9.2)
NEUTROPHILS NFR BLD AUTO: 36 %
PLATELET # BLD AUTO: 131 X10(3)/MCL (ref 130–400)
PMV BLD AUTO: 10.4 FL (ref 7.4–10.4)
POTASSIUM SERPL-SCNC: 4.2 MMOL/L (ref 3.5–5.1)
PROT SERPL-MCNC: 7.1 GM/DL (ref 6.4–8.2)
PROT UR STRIP-MCNC: 6.8 MG/DL
RBC # BLD AUTO: 4.16 X10(6)/MCL (ref 4.5–5.9)
SODIUM SERPL-SCNC: 140 MMOL/L (ref 136–145)
WBC # SPEC AUTO: 3.4 X10(3)/MCL (ref 4.5–11)

## 2020-01-14 ENCOUNTER — HISTORICAL (OUTPATIENT)
Dept: ADMINISTRATIVE | Facility: HOSPITAL | Age: 40
End: 2020-01-14

## 2020-01-14 LAB
ABS NEUT (OLG): 1.67 X10(3)/MCL (ref 2.1–9.2)
ALBUMIN SERPL-MCNC: 3.7 GM/DL (ref 3.4–5)
ALBUMIN/GLOB SERPL: 0.9 RATIO (ref 1.1–2)
ALP SERPL-CCNC: 103 UNIT/L (ref 45–117)
ALT SERPL-CCNC: 58 UNIT/L (ref 12–78)
AST SERPL-CCNC: 46 UNIT/L (ref 15–37)
BASOPHILS # BLD AUTO: 0 X10(3)/MCL (ref 0–0.2)
BASOPHILS NFR BLD AUTO: 0 %
BILIRUB SERPL-MCNC: 0.6 MG/DL (ref 0.2–1)
BILIRUBIN DIRECT+TOT PNL SERPL-MCNC: 0.2 MG/DL (ref 0–0.2)
BILIRUBIN DIRECT+TOT PNL SERPL-MCNC: 0.4 MG/DL
BUN SERPL-MCNC: 13 MG/DL (ref 7–18)
CALCIUM SERPL-MCNC: 9.3 MG/DL (ref 8.5–10.1)
CHLORIDE SERPL-SCNC: 104 MMOL/L (ref 98–107)
CO2 SERPL-SCNC: 30 MMOL/L (ref 21–32)
CREAT SERPL-MCNC: 1.6 MG/DL (ref 0.6–1.3)
EOSINOPHIL # BLD AUTO: 0 X10(3)/MCL (ref 0–0.9)
EOSINOPHIL NFR BLD AUTO: 1 %
ERYTHROCYTE [DISTWIDTH] IN BLOOD BY AUTOMATED COUNT: 16 % (ref 11.5–14.5)
GLOBULIN SER-MCNC: 4 GM/ML (ref 2.3–3.5)
GLUCOSE SERPL-MCNC: 87 MG/DL (ref 74–106)
HCT VFR BLD AUTO: 40.7 % (ref 40–51)
HGB BLD-MCNC: 12.6 GM/DL (ref 13.5–17.5)
IMM GRANULOCYTES # BLD AUTO: 0.01 10*3/UL
IMM GRANULOCYTES NFR BLD AUTO: 0 %
LYMPHOCYTES # BLD AUTO: 1.8 X10(3)/MCL (ref 0.6–4.6)
LYMPHOCYTES NFR BLD AUTO: 46 %
MAGNESIUM SERPL-MCNC: 2 MG/DL (ref 1.6–2.6)
MCH RBC QN AUTO: 28.1 PG (ref 26–34)
MCHC RBC AUTO-ENTMCNC: 31 GM/DL (ref 31–37)
MCV RBC AUTO: 90.6 FL (ref 80–100)
MONOCYTES # BLD AUTO: 0.4 X10(3)/MCL (ref 0.1–1.3)
MONOCYTES NFR BLD AUTO: 9 %
NEUTROPHILS # BLD AUTO: 1.67 X10(3)/MCL (ref 2.1–9.2)
NEUTROPHILS NFR BLD AUTO: 43 %
PLATELET # BLD AUTO: 136 X10(3)/MCL (ref 130–400)
PMV BLD AUTO: 9.8 FL (ref 7.4–10.4)
POTASSIUM SERPL-SCNC: 3.7 MMOL/L (ref 3.5–5.1)
PROT SERPL-MCNC: 7.7 GM/DL (ref 6.4–8.2)
PROT UR STRIP-MCNC: 6.6 MG/DL
RBC # BLD AUTO: 4.49 X10(6)/MCL (ref 4.5–5.9)
SODIUM SERPL-SCNC: 140 MMOL/L (ref 136–145)
WBC # SPEC AUTO: 3.9 X10(3)/MCL (ref 4.5–11)

## 2020-01-15 ENCOUNTER — HISTORICAL (OUTPATIENT)
Dept: INFUSION THERAPY | Facility: HOSPITAL | Age: 40
End: 2020-01-15

## 2020-01-17 ENCOUNTER — HISTORICAL (OUTPATIENT)
Dept: INFUSION THERAPY | Facility: HOSPITAL | Age: 40
End: 2020-01-17

## 2020-01-28 ENCOUNTER — HISTORICAL (OUTPATIENT)
Dept: ADMINISTRATIVE | Facility: HOSPITAL | Age: 40
End: 2020-01-28

## 2020-01-28 LAB
ABS NEUT (OLG): 2.1 X10(3)/MCL (ref 2.1–9.2)
ALBUMIN SERPL-MCNC: 3.4 GM/DL (ref 3.4–5)
ALBUMIN/GLOB SERPL: 0.8 RATIO (ref 1.1–2)
ALP SERPL-CCNC: 88 UNIT/L (ref 45–117)
ALT SERPL-CCNC: 31 UNIT/L (ref 12–78)
AST SERPL-CCNC: 38 UNIT/L (ref 15–37)
BASOPHILS # BLD AUTO: 0 X10(3)/MCL (ref 0–0.2)
BASOPHILS NFR BLD AUTO: 0 %
BILIRUB SERPL-MCNC: 0.4 MG/DL (ref 0.2–1)
BILIRUBIN DIRECT+TOT PNL SERPL-MCNC: <0.1 MG/DL (ref 0–0.2)
BILIRUBIN DIRECT+TOT PNL SERPL-MCNC: >0.3 MG/DL
BUN SERPL-MCNC: 11 MG/DL (ref 7–18)
CALCIUM SERPL-MCNC: 9.5 MG/DL (ref 8.5–10.1)
CEA SERPL-MCNC: 27.4 NG/ML
CHLORIDE SERPL-SCNC: 104 MMOL/L (ref 98–107)
CO2 SERPL-SCNC: 31 MMOL/L (ref 21–32)
CREAT SERPL-MCNC: 1.5 MG/DL (ref 0.6–1.3)
EOSINOPHIL # BLD AUTO: 0 X10(3)/MCL (ref 0–0.9)
EOSINOPHIL NFR BLD AUTO: 1 %
ERYTHROCYTE [DISTWIDTH] IN BLOOD BY AUTOMATED COUNT: 15.4 % (ref 11.5–14.5)
GLOBULIN SER-MCNC: 4.1 GM/ML (ref 2.3–3.5)
GLUCOSE SERPL-MCNC: 87 MG/DL (ref 74–106)
HCT VFR BLD AUTO: 40.6 % (ref 40–51)
HGB BLD-MCNC: 12.7 GM/DL (ref 13.5–17.5)
IMM GRANULOCYTES # BLD AUTO: 0.01 10*3/UL
IMM GRANULOCYTES NFR BLD AUTO: 0 %
LYMPHOCYTES # BLD AUTO: 1.7 X10(3)/MCL (ref 0.6–4.6)
LYMPHOCYTES NFR BLD AUTO: 40 %
MCH RBC QN AUTO: 28.3 PG (ref 26–34)
MCHC RBC AUTO-ENTMCNC: 31.3 GM/DL (ref 31–37)
MCV RBC AUTO: 90.6 FL (ref 80–100)
MONOCYTES # BLD AUTO: 0.4 X10(3)/MCL (ref 0.1–1.3)
MONOCYTES NFR BLD AUTO: 9 %
NEUTROPHILS # BLD AUTO: 2.1 X10(3)/MCL (ref 2.1–9.2)
NEUTROPHILS NFR BLD AUTO: 50 %
PLATELET # BLD AUTO: 139 X10(3)/MCL (ref 130–400)
PMV BLD AUTO: 9.9 FL (ref 7.4–10.4)
POTASSIUM SERPL-SCNC: 3.9 MMOL/L (ref 3.5–5.1)
PROT SERPL-MCNC: 7.5 GM/DL (ref 6.4–8.2)
PROT UR STRIP-MCNC: 6.4 MG/DL
RBC # BLD AUTO: 4.48 X10(6)/MCL (ref 4.5–5.9)
SODIUM SERPL-SCNC: 138 MMOL/L (ref 136–145)
T4 FREE SERPL-MCNC: 0.74 NG/DL (ref 0.76–1.46)
TSH SERPL-ACNC: 5.65 MIU/L (ref 0.36–3.74)
WBC # SPEC AUTO: 4.2 X10(3)/MCL (ref 4.5–11)

## 2020-01-29 ENCOUNTER — HISTORICAL (OUTPATIENT)
Dept: INFUSION THERAPY | Facility: HOSPITAL | Age: 40
End: 2020-01-29

## 2020-01-31 ENCOUNTER — HISTORICAL (OUTPATIENT)
Dept: INFUSION THERAPY | Facility: HOSPITAL | Age: 40
End: 2020-01-31

## 2020-02-03 ENCOUNTER — HISTORICAL (OUTPATIENT)
Dept: RADIOLOGY | Facility: HOSPITAL | Age: 40
End: 2020-02-03

## 2020-02-11 ENCOUNTER — HISTORICAL (OUTPATIENT)
Dept: ADMINISTRATIVE | Facility: HOSPITAL | Age: 40
End: 2020-02-11

## 2020-02-11 LAB
ABS NEUT (OLG): 1.86 X10(3)/MCL (ref 2.1–9.2)
ALBUMIN SERPL-MCNC: 3.6 GM/DL (ref 3.4–5)
ALBUMIN/GLOB SERPL: 0.9 RATIO (ref 1.1–2)
ALP SERPL-CCNC: 72 UNIT/L (ref 45–117)
ALT SERPL-CCNC: 42 UNIT/L (ref 12–78)
AST SERPL-CCNC: 40 UNIT/L (ref 15–37)
BASOPHILS # BLD AUTO: 0 X10(3)/MCL (ref 0–0.2)
BASOPHILS NFR BLD AUTO: 1 %
BILIRUB SERPL-MCNC: 0.4 MG/DL (ref 0.2–1)
BILIRUBIN DIRECT+TOT PNL SERPL-MCNC: 0.1 MG/DL (ref 0–0.2)
BILIRUBIN DIRECT+TOT PNL SERPL-MCNC: 0.3 MG/DL
BUN SERPL-MCNC: 20 MG/DL (ref 7–18)
CALCIUM SERPL-MCNC: 9.4 MG/DL (ref 8.5–10.1)
CHLORIDE SERPL-SCNC: 104 MMOL/L (ref 98–107)
CO2 SERPL-SCNC: 32 MMOL/L (ref 21–32)
CREAT SERPL-MCNC: 1.6 MG/DL (ref 0.6–1.3)
EOSINOPHIL # BLD AUTO: 0 X10(3)/MCL (ref 0–0.9)
EOSINOPHIL NFR BLD AUTO: 1 %
ERYTHROCYTE [DISTWIDTH] IN BLOOD BY AUTOMATED COUNT: 15.5 % (ref 11.5–14.5)
GLOBULIN SER-MCNC: 4 GM/ML (ref 2.3–3.5)
GLUCOSE SERPL-MCNC: 92 MG/DL (ref 74–106)
HCT VFR BLD AUTO: 40.4 % (ref 40–51)
HGB BLD-MCNC: 12.7 GM/DL (ref 13.5–17.5)
IMM GRANULOCYTES # BLD AUTO: 0.02 10*3/UL
IMM GRANULOCYTES NFR BLD AUTO: 0 %
LYMPHOCYTES # BLD AUTO: 1.8 X10(3)/MCL (ref 0.6–4.6)
LYMPHOCYTES NFR BLD AUTO: 43 %
MCH RBC QN AUTO: 27.9 PG (ref 26–34)
MCHC RBC AUTO-ENTMCNC: 31.4 GM/DL (ref 31–37)
MCV RBC AUTO: 88.6 FL (ref 80–100)
MONOCYTES # BLD AUTO: 0.5 X10(3)/MCL (ref 0.1–1.3)
MONOCYTES NFR BLD AUTO: 11 %
NEUTROPHILS # BLD AUTO: 1.86 X10(3)/MCL (ref 2.1–9.2)
NEUTROPHILS NFR BLD AUTO: 44 %
PLATELET # BLD AUTO: 144 X10(3)/MCL (ref 130–400)
PMV BLD AUTO: 9.5 FL (ref 7.4–10.4)
POTASSIUM SERPL-SCNC: 4.1 MMOL/L (ref 3.5–5.1)
PROT SERPL-MCNC: 7.6 GM/DL (ref 6.4–8.2)
PROT UR STRIP-MCNC: 6.8 MG/DL
RBC # BLD AUTO: 4.56 X10(6)/MCL (ref 4.5–5.9)
SODIUM SERPL-SCNC: 140 MMOL/L (ref 136–145)
WBC # SPEC AUTO: 4.2 X10(3)/MCL (ref 4.5–11)

## 2020-02-12 ENCOUNTER — HISTORICAL (OUTPATIENT)
Dept: INFUSION THERAPY | Facility: HOSPITAL | Age: 40
End: 2020-02-12

## 2020-02-14 ENCOUNTER — HISTORICAL (OUTPATIENT)
Dept: INFUSION THERAPY | Facility: HOSPITAL | Age: 40
End: 2020-02-14

## 2020-02-24 ENCOUNTER — HISTORICAL (OUTPATIENT)
Dept: ADMINISTRATIVE | Facility: HOSPITAL | Age: 40
End: 2020-02-24

## 2020-02-24 LAB
ABS NEUT (OLG): 1.41 X10(3)/MCL (ref 2.1–9.2)
ALBUMIN SERPL-MCNC: 3.5 GM/DL (ref 3.4–5)
ALBUMIN/GLOB SERPL: 0.8 RATIO (ref 1.1–2)
ALP SERPL-CCNC: 80 UNIT/L (ref 45–117)
ALT SERPL-CCNC: 47 UNIT/L (ref 12–78)
AST SERPL-CCNC: 40 UNIT/L (ref 15–37)
BASOPHILS # BLD AUTO: 0 X10(3)/MCL (ref 0–0.2)
BASOPHILS NFR BLD AUTO: 0 %
BILIRUB SERPL-MCNC: 0.4 MG/DL (ref 0.2–1)
BILIRUBIN DIRECT+TOT PNL SERPL-MCNC: 0.1 MG/DL (ref 0–0.2)
BILIRUBIN DIRECT+TOT PNL SERPL-MCNC: 0.3 MG/DL
BUN SERPL-MCNC: 14 MG/DL (ref 7–18)
CALCIUM SERPL-MCNC: 9.5 MG/DL (ref 8.5–10.1)
CEA SERPL-MCNC: 31.1 NG/ML
CHLORIDE SERPL-SCNC: 105 MMOL/L (ref 98–107)
CO2 SERPL-SCNC: 31 MMOL/L (ref 21–32)
CREAT SERPL-MCNC: 1.5 MG/DL (ref 0.6–1.3)
EOSINOPHIL # BLD AUTO: 0 X10(3)/MCL (ref 0–0.9)
EOSINOPHIL NFR BLD AUTO: 1 %
ERYTHROCYTE [DISTWIDTH] IN BLOOD BY AUTOMATED COUNT: 16 % (ref 11.5–14.5)
GLOBULIN SER-MCNC: 4.2 GM/ML (ref 2.3–3.5)
GLUCOSE SERPL-MCNC: 80 MG/DL (ref 74–106)
HCT VFR BLD AUTO: 40.4 % (ref 40–51)
HGB BLD-MCNC: 12.8 GM/DL (ref 13.5–17.5)
IMM GRANULOCYTES # BLD AUTO: 0.01 10*3/UL
IMM GRANULOCYTES NFR BLD AUTO: 0 %
LYMPHOCYTES # BLD AUTO: 2 X10(3)/MCL (ref 0.6–4.6)
LYMPHOCYTES NFR BLD AUTO: 52 %
MCH RBC QN AUTO: 28.4 PG (ref 26–34)
MCHC RBC AUTO-ENTMCNC: 31.7 GM/DL (ref 31–37)
MCV RBC AUTO: 89.6 FL (ref 80–100)
MONOCYTES # BLD AUTO: 0.4 X10(3)/MCL (ref 0.1–1.3)
MONOCYTES NFR BLD AUTO: 10 %
NEUTROPHILS # BLD AUTO: 1.41 X10(3)/MCL (ref 2.1–9.2)
NEUTROPHILS NFR BLD AUTO: 36 %
PLATELET # BLD AUTO: 145 X10(3)/MCL (ref 130–400)
PMV BLD AUTO: 10.5 FL (ref 7.4–10.4)
POTASSIUM SERPL-SCNC: 3.9 MMOL/L (ref 3.5–5.1)
PROT SERPL-MCNC: 7.7 GM/DL (ref 6.4–8.2)
PROT UR STRIP-MCNC: 7.9 MG/DL
RBC # BLD AUTO: 4.51 X10(6)/MCL (ref 4.5–5.9)
SODIUM SERPL-SCNC: 140 MMOL/L (ref 136–145)
WBC # SPEC AUTO: 3.9 X10(3)/MCL (ref 4.5–11)

## 2020-02-26 ENCOUNTER — HISTORICAL (OUTPATIENT)
Dept: INFUSION THERAPY | Facility: HOSPITAL | Age: 40
End: 2020-02-26

## 2020-02-26 LAB
ABS NEUT (OLG): 1.96 X10(3)/MCL (ref 2.1–9.2)
ALBUMIN SERPL-MCNC: 3.5 GM/DL (ref 3.4–5)
ALBUMIN/GLOB SERPL: 0.9 RATIO (ref 1.1–2)
ALP SERPL-CCNC: 78 UNIT/L (ref 45–117)
ALT SERPL-CCNC: 41 UNIT/L (ref 12–78)
AST SERPL-CCNC: 36 UNIT/L (ref 15–37)
BASOPHILS # BLD AUTO: 0 X10(3)/MCL (ref 0–0.2)
BASOPHILS NFR BLD AUTO: 0 %
BILIRUB SERPL-MCNC: 0.5 MG/DL (ref 0.2–1)
BILIRUBIN DIRECT+TOT PNL SERPL-MCNC: 0.1 MG/DL (ref 0–0.2)
BILIRUBIN DIRECT+TOT PNL SERPL-MCNC: 0.4 MG/DL
BUN SERPL-MCNC: 17 MG/DL (ref 7–18)
CALCIUM SERPL-MCNC: 9.4 MG/DL (ref 8.5–10.1)
CHLORIDE SERPL-SCNC: 106 MMOL/L (ref 98–107)
CO2 SERPL-SCNC: 30 MMOL/L (ref 21–32)
CREAT SERPL-MCNC: 1.5 MG/DL (ref 0.6–1.3)
EOSINOPHIL # BLD AUTO: 0 X10(3)/MCL (ref 0–0.9)
EOSINOPHIL NFR BLD AUTO: 1 %
ERYTHROCYTE [DISTWIDTH] IN BLOOD BY AUTOMATED COUNT: 15.9 % (ref 11.5–14.5)
GLOBULIN SER-MCNC: 3.9 GM/ML (ref 2.3–3.5)
GLUCOSE SERPL-MCNC: 70 MG/DL (ref 74–106)
HCT VFR BLD AUTO: 39.8 % (ref 40–51)
HGB BLD-MCNC: 12.5 GM/DL (ref 13.5–17.5)
LYMPHOCYTES # BLD AUTO: 1.6 X10(3)/MCL (ref 0.6–4.6)
LYMPHOCYTES NFR BLD AUTO: 38 %
MCH RBC QN AUTO: 28 PG (ref 26–34)
MCHC RBC AUTO-ENTMCNC: 31.4 GM/DL (ref 31–37)
MCV RBC AUTO: 89.2 FL (ref 80–100)
MONOCYTES # BLD AUTO: 0.6 X10(3)/MCL (ref 0.1–1.3)
MONOCYTES NFR BLD AUTO: 13 %
NEUTROPHILS # BLD AUTO: 1.96 X10(3)/MCL (ref 2.1–9.2)
NEUTROPHILS NFR BLD AUTO: 47 %
PLATELET # BLD AUTO: 115 X10(3)/MCL (ref 130–400)
PMV BLD AUTO: 10.2 FL (ref 7.4–10.4)
POTASSIUM SERPL-SCNC: 3.8 MMOL/L (ref 3.5–5.1)
PROT SERPL-MCNC: 7.4 GM/DL (ref 6.4–8.2)
PROT UR STRIP-MCNC: 7.7 MG/DL
RBC # BLD AUTO: 4.46 X10(6)/MCL (ref 4.5–5.9)
SODIUM SERPL-SCNC: 139 MMOL/L (ref 136–145)
WBC # SPEC AUTO: 4.2 X10(3)/MCL (ref 4.5–11)

## 2020-02-28 ENCOUNTER — HISTORICAL (OUTPATIENT)
Dept: INFUSION THERAPY | Facility: HOSPITAL | Age: 40
End: 2020-02-28

## 2020-03-10 ENCOUNTER — HISTORICAL (OUTPATIENT)
Dept: ADMINISTRATIVE | Facility: HOSPITAL | Age: 40
End: 2020-03-10

## 2020-03-10 LAB
ABS NEUT (OLG): 1.74 X10(3)/MCL (ref 2.1–9.2)
ALBUMIN SERPL-MCNC: 3.6 GM/DL (ref 3.4–5)
ALBUMIN/GLOB SERPL: 0.9 RATIO (ref 1.1–2)
ALP SERPL-CCNC: 77 UNIT/L (ref 45–117)
ALT SERPL-CCNC: 36 UNIT/L (ref 12–78)
AST SERPL-CCNC: 40 UNIT/L (ref 15–37)
BASOPHILS # BLD AUTO: 0 X10(3)/MCL (ref 0–0.2)
BASOPHILS NFR BLD AUTO: 0 %
BILIRUB SERPL-MCNC: 0.4 MG/DL (ref 0.2–1)
BILIRUBIN DIRECT+TOT PNL SERPL-MCNC: 0.1 MG/DL (ref 0–0.2)
BILIRUBIN DIRECT+TOT PNL SERPL-MCNC: 0.3 MG/DL
BUN SERPL-MCNC: 12 MG/DL (ref 7–18)
CALCIUM SERPL-MCNC: 9 MG/DL (ref 8.5–10.1)
CHLORIDE SERPL-SCNC: 106 MMOL/L (ref 98–107)
CO2 SERPL-SCNC: 30 MMOL/L (ref 21–32)
CREAT SERPL-MCNC: 1.6 MG/DL (ref 0.6–1.3)
EOSINOPHIL # BLD AUTO: 0 X10(3)/MCL (ref 0–0.9)
EOSINOPHIL NFR BLD AUTO: 1 %
ERYTHROCYTE [DISTWIDTH] IN BLOOD BY AUTOMATED COUNT: 15.8 % (ref 11.5–14.5)
GLOBULIN SER-MCNC: 4 GM/ML (ref 2.3–3.5)
GLUCOSE SERPL-MCNC: 92 MG/DL (ref 74–106)
HCT VFR BLD AUTO: 39.4 % (ref 40–51)
HGB BLD-MCNC: 12.4 GM/DL (ref 13.5–17.5)
IMM GRANULOCYTES # BLD AUTO: 0.02 10*3/UL
IMM GRANULOCYTES NFR BLD AUTO: 0 %
LYMPHOCYTES # BLD AUTO: 1.7 X10(3)/MCL (ref 0.6–4.6)
LYMPHOCYTES NFR BLD AUTO: 44 %
MAGNESIUM SERPL-MCNC: 1.8 MG/DL (ref 1.6–2.6)
MCH RBC QN AUTO: 28 PG (ref 26–34)
MCHC RBC AUTO-ENTMCNC: 31.5 GM/DL (ref 31–37)
MCV RBC AUTO: 88.9 FL (ref 80–100)
MONOCYTES # BLD AUTO: 0.3 X10(3)/MCL (ref 0.1–1.3)
MONOCYTES NFR BLD AUTO: 9 %
NEUTROPHILS # BLD AUTO: 1.74 X10(3)/MCL (ref 2.1–9.2)
NEUTROPHILS NFR BLD AUTO: 45 %
PLATELET # BLD AUTO: 150 X10(3)/MCL (ref 130–400)
PMV BLD AUTO: 10.1 FL (ref 7.4–10.4)
POTASSIUM SERPL-SCNC: 4.2 MMOL/L (ref 3.5–5.1)
PROT SERPL-MCNC: 7.6 GM/DL (ref 6.4–8.2)
PROT UR STRIP-MCNC: 5.6 MG/DL
RBC # BLD AUTO: 4.43 X10(6)/MCL (ref 4.5–5.9)
SODIUM SERPL-SCNC: 138 MMOL/L (ref 136–145)
T4 FREE SERPL-MCNC: 0.72 NG/DL (ref 0.76–1.46)
TSH SERPL-ACNC: 6.42 MIU/L (ref 0.36–3.74)
WBC # SPEC AUTO: 3.9 X10(3)/MCL (ref 4.5–11)

## 2020-03-11 ENCOUNTER — HISTORICAL (OUTPATIENT)
Dept: INFUSION THERAPY | Facility: HOSPITAL | Age: 40
End: 2020-03-11

## 2020-03-13 ENCOUNTER — HISTORICAL (OUTPATIENT)
Dept: INFUSION THERAPY | Facility: HOSPITAL | Age: 40
End: 2020-03-13

## 2020-03-25 ENCOUNTER — HISTORICAL (OUTPATIENT)
Dept: INFUSION THERAPY | Facility: HOSPITAL | Age: 40
End: 2020-03-25

## 2020-03-25 LAB
ABS NEUT (OLG): 2.18 X10(3)/MCL (ref 2.1–9.2)
ALBUMIN SERPL-MCNC: 3.1 GM/DL (ref 3.4–5)
ALBUMIN/GLOB SERPL: 0.7 RATIO (ref 1.1–2)
ALP SERPL-CCNC: 80 UNIT/L (ref 45–117)
ALT SERPL-CCNC: 32 UNIT/L (ref 12–78)
AST SERPL-CCNC: 46 UNIT/L (ref 15–37)
BASOPHILS # BLD AUTO: 0 X10(3)/MCL (ref 0–0.2)
BASOPHILS NFR BLD AUTO: 0 %
BILIRUB SERPL-MCNC: 0.4 MG/DL (ref 0.2–1)
BILIRUBIN DIRECT+TOT PNL SERPL-MCNC: <0.1 MG/DL (ref 0–0.2)
BILIRUBIN DIRECT+TOT PNL SERPL-MCNC: ABNORMAL MG/DL
BUN SERPL-MCNC: 21 MG/DL (ref 7–18)
CALCIUM SERPL-MCNC: 8.8 MG/DL (ref 8.5–10.1)
CHLORIDE SERPL-SCNC: 108 MMOL/L (ref 98–107)
CO2 SERPL-SCNC: 29 MMOL/L (ref 21–32)
CREAT SERPL-MCNC: 1.5 MG/DL (ref 0.6–1.3)
EOSINOPHIL # BLD AUTO: 0 X10(3)/MCL (ref 0–0.9)
EOSINOPHIL NFR BLD AUTO: 1 %
ERYTHROCYTE [DISTWIDTH] IN BLOOD BY AUTOMATED COUNT: 15.7 % (ref 11.5–14.5)
GLOBULIN SER-MCNC: 4.2 GM/ML (ref 2.3–3.5)
GLUCOSE SERPL-MCNC: 92 MG/DL (ref 74–106)
HCT VFR BLD AUTO: 37.3 % (ref 40–51)
HGB BLD-MCNC: 11.9 GM/DL (ref 13.5–17.5)
IMM GRANULOCYTES # BLD AUTO: 0.02 10*3/UL
IMM GRANULOCYTES NFR BLD AUTO: 0 %
LYMPHOCYTES # BLD AUTO: 1.7 X10(3)/MCL (ref 0.6–4.6)
LYMPHOCYTES NFR BLD AUTO: 38 %
MAGNESIUM SERPL-MCNC: 1.8 MG/DL (ref 1.6–2.6)
MCH RBC QN AUTO: 28.6 PG (ref 26–34)
MCHC RBC AUTO-ENTMCNC: 31.9 GM/DL (ref 31–37)
MCV RBC AUTO: 89.7 FL (ref 80–100)
MONOCYTES # BLD AUTO: 0.5 X10(3)/MCL (ref 0.1–1.3)
MONOCYTES NFR BLD AUTO: 11 %
NEUTROPHILS # BLD AUTO: 2.18 X10(3)/MCL (ref 2.1–9.2)
NEUTROPHILS NFR BLD AUTO: 50 %
PLATELET # BLD AUTO: 119 X10(3)/MCL (ref 130–400)
PMV BLD AUTO: 10.5 FL (ref 7.4–10.4)
POTASSIUM SERPL-SCNC: 4.5 MMOL/L (ref 3.5–5.1)
PROT SERPL-MCNC: 7.3 GM/DL (ref 6.4–8.2)
PROT UR STRIP-MCNC: 8.1 MG/DL
RBC # BLD AUTO: 4.16 X10(6)/MCL (ref 4.5–5.9)
SODIUM SERPL-SCNC: 140 MMOL/L (ref 136–145)
WBC # SPEC AUTO: 4.4 X10(3)/MCL (ref 4.5–11)

## 2020-03-27 ENCOUNTER — HISTORICAL (OUTPATIENT)
Dept: INFUSION THERAPY | Facility: HOSPITAL | Age: 40
End: 2020-03-27

## 2020-04-06 ENCOUNTER — HISTORICAL (OUTPATIENT)
Dept: ADMINISTRATIVE | Facility: HOSPITAL | Age: 40
End: 2020-04-06

## 2020-04-06 LAB
ABS NEUT (OLG): 1.12 X10(3)/MCL (ref 2.1–9.2)
ALBUMIN SERPL-MCNC: 3.5 GM/DL (ref 3.4–5)
ALBUMIN/GLOB SERPL: 0.9 RATIO (ref 1.1–2)
ALP SERPL-CCNC: 74 UNIT/L (ref 45–117)
ALT SERPL-CCNC: 30 UNIT/L (ref 12–78)
ANISOCYTOSIS BLD QL SMEAR: ABNORMAL
AST SERPL-CCNC: 35 UNIT/L (ref 15–37)
BASOPHILS NFR BLD MANUAL: 0 %
BILIRUB SERPL-MCNC: 0.5 MG/DL (ref 0.2–1)
BILIRUBIN DIRECT+TOT PNL SERPL-MCNC: 0.1 MG/DL (ref 0–0.2)
BILIRUBIN DIRECT+TOT PNL SERPL-MCNC: 0.4 MG/DL
BUN SERPL-MCNC: 27 MG/DL (ref 7–18)
CALCIUM SERPL-MCNC: 8.8 MG/DL (ref 8.5–10.1)
CHLORIDE SERPL-SCNC: 105 MMOL/L (ref 98–107)
CO2 SERPL-SCNC: 27 MMOL/L (ref 21–32)
CREAT SERPL-MCNC: 1.8 MG/DL (ref 0.6–1.3)
EOSINOPHIL NFR BLD MANUAL: 1 %
ERYTHROCYTE [DISTWIDTH] IN BLOOD BY AUTOMATED COUNT: 15.5 % (ref 11.5–14.5)
GLOBULIN SER-MCNC: 4.1 GM/ML (ref 2.3–3.5)
GLUCOSE SERPL-MCNC: 100 MG/DL (ref 74–106)
GRANULOCYTES NFR BLD MANUAL: 35 % (ref 43–75)
HCT VFR BLD AUTO: 37 % (ref 40–51)
HGB BLD-MCNC: 11.8 GM/DL (ref 13.5–17.5)
LYMPHOCYTES NFR BLD MANUAL: 3 %
LYMPHOCYTES NFR BLD MANUAL: 45 % (ref 20.5–51.1)
MAGNESIUM SERPL-MCNC: 2.2 MG/DL (ref 1.6–2.6)
MCH RBC QN AUTO: 28.6 PG (ref 26–34)
MCHC RBC AUTO-ENTMCNC: 31.9 GM/DL (ref 31–37)
MCV RBC AUTO: 89.8 FL (ref 80–100)
MONOCYTES NFR BLD MANUAL: 16 % (ref 2–9)
PLATELET # BLD AUTO: 146 X10(3)/MCL (ref 130–400)
PLATELET # BLD EST: ADEQUATE 10*3/UL
PMV BLD AUTO: 10 FL (ref 7.4–10.4)
POTASSIUM SERPL-SCNC: 4.3 MMOL/L (ref 3.5–5.1)
PROT SERPL-MCNC: 7.6 GM/DL (ref 6.4–8.2)
PROT UR STRIP-MCNC: 9.5 MG/DL
RBC # BLD AUTO: 4.12 X10(6)/MCL (ref 4.5–5.9)
RBC MORPH BLD: ABNORMAL
SODIUM SERPL-SCNC: 138 MMOL/L (ref 136–145)
WBC # SPEC AUTO: 3.3 X10(3)/MCL (ref 4.5–11)

## 2020-04-09 ENCOUNTER — HISTORICAL (OUTPATIENT)
Dept: RADIOLOGY | Facility: HOSPITAL | Age: 40
End: 2020-04-09

## 2020-04-13 ENCOUNTER — HISTORICAL (OUTPATIENT)
Dept: INFUSION THERAPY | Facility: HOSPITAL | Age: 40
End: 2020-04-13

## 2020-04-13 LAB
ABS NEUT (OLG): 1.72 X10(3)/MCL (ref 2.1–9.2)
ALBUMIN SERPL-MCNC: 3.2 GM/DL (ref 3.4–5)
ALBUMIN/GLOB SERPL: 0.8 RATIO (ref 1.1–2)
ALP SERPL-CCNC: 73 UNIT/L (ref 45–117)
ALT SERPL-CCNC: 41 UNIT/L (ref 12–78)
AST SERPL-CCNC: 48 UNIT/L (ref 15–37)
BASOPHILS # BLD AUTO: 0 X10(3)/MCL (ref 0–0.2)
BASOPHILS NFR BLD AUTO: 0 %
BILIRUB SERPL-MCNC: 0.4 MG/DL (ref 0.2–1)
BILIRUBIN DIRECT+TOT PNL SERPL-MCNC: 0.1 MG/DL (ref 0–0.2)
BILIRUBIN DIRECT+TOT PNL SERPL-MCNC: 0.3 MG/DL
BUN SERPL-MCNC: 9 MG/DL (ref 7–18)
CALCIUM SERPL-MCNC: 8.7 MG/DL (ref 8.5–10.1)
CEA SERPL-MCNC: 41.9 NG/ML
CHLORIDE SERPL-SCNC: 106 MMOL/L (ref 98–107)
CO2 SERPL-SCNC: 28 MMOL/L (ref 21–32)
CREAT SERPL-MCNC: 1.6 MG/DL (ref 0.6–1.3)
EOSINOPHIL # BLD AUTO: 0 X10(3)/MCL (ref 0–0.9)
EOSINOPHIL NFR BLD AUTO: 1 %
ERYTHROCYTE [DISTWIDTH] IN BLOOD BY AUTOMATED COUNT: 16.2 % (ref 11.5–14.5)
GLOBULIN SER-MCNC: 3.9 GM/ML (ref 2.3–3.5)
GLUCOSE SERPL-MCNC: 98 MG/DL (ref 74–106)
HCT VFR BLD AUTO: 35.6 % (ref 40–51)
HGB BLD-MCNC: 11.2 GM/DL (ref 13.5–17.5)
IMM GRANULOCYTES # BLD AUTO: 0.01 10*3/UL
IMM GRANULOCYTES NFR BLD AUTO: 0 %
LYMPHOCYTES # BLD AUTO: 1.7 X10(3)/MCL (ref 0.6–4.6)
LYMPHOCYTES NFR BLD AUTO: 43 %
MAGNESIUM SERPL-MCNC: 1.3 MG/DL (ref 1.6–2.6)
MCH RBC QN AUTO: 28.7 PG (ref 26–34)
MCHC RBC AUTO-ENTMCNC: 31.5 GM/DL (ref 31–37)
MCV RBC AUTO: 91.3 FL (ref 80–100)
MONOCYTES # BLD AUTO: 0.5 X10(3)/MCL (ref 0.1–1.3)
MONOCYTES NFR BLD AUTO: 12 %
NEUTROPHILS # BLD AUTO: 1.72 X10(3)/MCL (ref 2.1–9.2)
NEUTROPHILS NFR BLD AUTO: 44 %
PLATELET # BLD AUTO: 140 X10(3)/MCL (ref 130–400)
PMV BLD AUTO: 9.7 FL (ref 7.4–10.4)
POTASSIUM SERPL-SCNC: 3.9 MMOL/L (ref 3.5–5.1)
PROT SERPL-MCNC: 7.1 GM/DL (ref 6.4–8.2)
PROT UR STRIP-MCNC: 9.5 MG/DL
RBC # BLD AUTO: 3.9 X10(6)/MCL (ref 4.5–5.9)
SODIUM SERPL-SCNC: 139 MMOL/L (ref 136–145)
WBC # SPEC AUTO: 3.9 X10(3)/MCL (ref 4.5–11)

## 2020-04-15 ENCOUNTER — HISTORICAL (OUTPATIENT)
Dept: INFUSION THERAPY | Facility: HOSPITAL | Age: 40
End: 2020-04-15

## 2020-04-27 ENCOUNTER — HISTORICAL (OUTPATIENT)
Dept: INFUSION THERAPY | Facility: HOSPITAL | Age: 40
End: 2020-04-27

## 2020-04-27 LAB
ABS NEUT (OLG): 2.13 X10(3)/MCL (ref 2.1–9.2)
ALBUMIN SERPL-MCNC: 3.4 GM/DL (ref 3.4–5)
ALBUMIN/GLOB SERPL: 0.8 RATIO (ref 1.1–2)
ALP SERPL-CCNC: 86 UNIT/L (ref 45–117)
ALT SERPL-CCNC: 24 UNIT/L (ref 12–78)
AST SERPL-CCNC: 28 UNIT/L (ref 15–37)
BASOPHILS # BLD AUTO: 0 X10(3)/MCL (ref 0–0.2)
BASOPHILS NFR BLD AUTO: 0 %
BILIRUB SERPL-MCNC: 0.6 MG/DL (ref 0.2–1)
BILIRUBIN DIRECT+TOT PNL SERPL-MCNC: 0.2 MG/DL (ref 0–0.2)
BILIRUBIN DIRECT+TOT PNL SERPL-MCNC: 0.4 MG/DL
BUN SERPL-MCNC: 12 MG/DL (ref 7–18)
CALCIUM SERPL-MCNC: 9.4 MG/DL (ref 8.5–10.1)
CEA SERPL-MCNC: 34.3 NG/ML
CHLORIDE SERPL-SCNC: 102 MMOL/L (ref 98–107)
CO2 SERPL-SCNC: 25 MMOL/L (ref 21–32)
CREAT SERPL-MCNC: 1.5 MG/DL (ref 0.6–1.3)
EOSINOPHIL # BLD AUTO: 0.5 X10(3)/MCL (ref 0–0.9)
EOSINOPHIL NFR BLD AUTO: 11 %
ERYTHROCYTE [DISTWIDTH] IN BLOOD BY AUTOMATED COUNT: 15.5 % (ref 11.5–14.5)
GLOBULIN SER-MCNC: 4.5 GM/ML (ref 2.3–3.5)
GLUCOSE SERPL-MCNC: 104 MG/DL (ref 74–106)
HCT VFR BLD AUTO: 34.3 % (ref 40–51)
HGB BLD-MCNC: 11.1 GM/DL (ref 13.5–17.5)
IMM GRANULOCYTES # BLD AUTO: 0.01 10*3/UL
IMM GRANULOCYTES NFR BLD AUTO: 0 %
LYMPHOCYTES # BLD AUTO: 1.4 X10(3)/MCL (ref 0.6–4.6)
LYMPHOCYTES NFR BLD AUTO: 31 %
MAGNESIUM SERPL-MCNC: 1.9 MG/DL (ref 1.6–2.6)
MCH RBC QN AUTO: 28.5 PG (ref 26–34)
MCHC RBC AUTO-ENTMCNC: 32.4 GM/DL (ref 31–37)
MCV RBC AUTO: 87.9 FL (ref 80–100)
MONOCYTES # BLD AUTO: 0.4 X10(3)/MCL (ref 0.1–1.3)
MONOCYTES NFR BLD AUTO: 10 %
NEUTROPHILS # BLD AUTO: 2.13 X10(3)/MCL (ref 2.1–9.2)
NEUTROPHILS NFR BLD AUTO: 48 %
PLATELET # BLD AUTO: 142 X10(3)/MCL (ref 130–400)
PMV BLD AUTO: 9.2 FL (ref 7.4–10.4)
POTASSIUM SERPL-SCNC: 3.4 MMOL/L (ref 3.5–5.1)
PROT SERPL-MCNC: 7.9 GM/DL (ref 6.4–8.2)
PROT UR STRIP-MCNC: 24.7 MG/DL
RBC # BLD AUTO: 3.9 X10(6)/MCL (ref 4.5–5.9)
SODIUM SERPL-SCNC: 134 MMOL/L (ref 136–145)
WBC # SPEC AUTO: 4.4 X10(3)/MCL (ref 4.5–11)

## 2020-04-29 ENCOUNTER — HISTORICAL (OUTPATIENT)
Dept: INFUSION THERAPY | Facility: HOSPITAL | Age: 40
End: 2020-04-29

## 2020-05-12 ENCOUNTER — HISTORICAL (OUTPATIENT)
Dept: INFUSION THERAPY | Facility: HOSPITAL | Age: 40
End: 2020-05-12

## 2020-05-12 LAB
ABS NEUT (OLG): 3.08 X10(3)/MCL (ref 2.1–9.2)
ALBUMIN SERPL-MCNC: 2.6 GM/DL (ref 3.4–5)
ALBUMIN/GLOB SERPL: 0.6 RATIO (ref 1.1–2)
ALP SERPL-CCNC: 123 UNIT/L (ref 45–117)
ALT SERPL-CCNC: 66 UNIT/L (ref 12–78)
ANISOCYTOSIS BLD QL SMEAR: NORMAL
AST SERPL-CCNC: 35 UNIT/L (ref 15–37)
BASOPHILS NFR BLD MANUAL: 0 %
BILIRUB SERPL-MCNC: 0.6 MG/DL (ref 0.2–1)
BILIRUBIN DIRECT+TOT PNL SERPL-MCNC: 0.2 MG/DL (ref 0–0.2)
BILIRUBIN DIRECT+TOT PNL SERPL-MCNC: 0.4 MG/DL
BUN SERPL-MCNC: 15 MG/DL (ref 7–18)
CALCIUM SERPL-MCNC: 8.8 MG/DL (ref 8.5–10.1)
CEA SERPL-MCNC: 27.2 NG/ML
CHLORIDE SERPL-SCNC: 100 MMOL/L (ref 98–107)
CO2 SERPL-SCNC: 24 MMOL/L (ref 21–32)
CREAT SERPL-MCNC: 1.6 MG/DL (ref 0.6–1.3)
EOSINOPHIL NFR BLD MANUAL: 1 %
ERYTHROCYTE [DISTWIDTH] IN BLOOD BY AUTOMATED COUNT: 15 % (ref 11.5–14.5)
GLOBULIN SER-MCNC: 4.7 GM/ML (ref 2.3–3.5)
GLUCOSE SERPL-MCNC: 107 MG/DL (ref 74–106)
GRANULOCYTES NFR BLD MANUAL: 57 % (ref 43–75)
HCT VFR BLD AUTO: 31.8 % (ref 40–51)
HGB BLD-MCNC: 10.5 GM/DL (ref 13.5–17.5)
LYMPHOCYTES NFR BLD MANUAL: 30 % (ref 20.5–51.1)
MAGNESIUM SERPL-MCNC: 1.7 MG/DL (ref 1.6–2.6)
MCH RBC QN AUTO: 28.3 PG (ref 26–34)
MCHC RBC AUTO-ENTMCNC: 33 GM/DL (ref 31–37)
MCV RBC AUTO: 85.7 FL (ref 80–100)
MICROCYTES BLD QL SMEAR: NORMAL
MONOCYTES NFR BLD MANUAL: 9 % (ref 2–9)
NEUTS BAND NFR BLD MANUAL: 3 % (ref 0–10)
PLATELET # BLD AUTO: 196 X10(3)/MCL (ref 130–400)
PLATELET # BLD EST: ADEQUATE 10*3/UL
PMV BLD AUTO: 9.8 FL (ref 7.4–10.4)
POLYCHROMASIA BLD QL SMEAR: NORMAL
POTASSIUM SERPL-SCNC: 3.4 MMOL/L (ref 3.5–5.1)
PROT SERPL-MCNC: 7.3 GM/DL (ref 6.4–8.2)
PROT UR STRIP-MCNC: 118.8 MG/DL
RBC # BLD AUTO: 3.71 X10(6)/MCL (ref 4.5–5.9)
RBC MORPH BLD: NORMAL
SODIUM SERPL-SCNC: 134 MMOL/L (ref 136–145)
WBC # SPEC AUTO: 5.3 X10(3)/MCL (ref 4.5–11)

## 2020-05-14 ENCOUNTER — HISTORICAL (OUTPATIENT)
Dept: INFUSION THERAPY | Facility: HOSPITAL | Age: 40
End: 2020-05-14

## 2020-05-14 LAB — PROT 24H UR-MCNC: 807.5 MG/24HR

## 2020-05-27 ENCOUNTER — HISTORICAL (OUTPATIENT)
Dept: ADMINISTRATIVE | Facility: HOSPITAL | Age: 40
End: 2020-05-27

## 2020-05-27 LAB
ABS NEUT (OLG): 0.73 X10(3)/MCL (ref 2.1–9.2)
ALBUMIN SERPL-MCNC: 2.8 GM/DL (ref 3.4–5)
ALBUMIN/GLOB SERPL: 0.6 RATIO (ref 1.1–2)
ALP SERPL-CCNC: 180 UNIT/L (ref 45–117)
ALT SERPL-CCNC: 170 UNIT/L (ref 12–78)
AST SERPL-CCNC: 53 UNIT/L (ref 15–37)
BASOPHILS # BLD AUTO: 0 X10(3)/MCL (ref 0–0.2)
BASOPHILS NFR BLD AUTO: 1 %
BILIRUB SERPL-MCNC: 0.4 MG/DL (ref 0.2–1)
BILIRUBIN DIRECT+TOT PNL SERPL-MCNC: 0.1 MG/DL (ref 0–0.2)
BILIRUBIN DIRECT+TOT PNL SERPL-MCNC: 0.3 MG/DL
BUN SERPL-MCNC: 13 MG/DL (ref 7–18)
CALCIUM SERPL-MCNC: 8.5 MG/DL (ref 8.5–10.1)
CHLORIDE SERPL-SCNC: 105 MMOL/L (ref 98–107)
CO2 SERPL-SCNC: 26 MMOL/L (ref 21–32)
CREAT SERPL-MCNC: 1.6 MG/DL (ref 0.6–1.3)
EOSINOPHIL # BLD AUTO: 0.1 X10(3)/MCL (ref 0–0.9)
EOSINOPHIL NFR BLD AUTO: 4 %
ERYTHROCYTE [DISTWIDTH] IN BLOOD BY AUTOMATED COUNT: 15.7 % (ref 11.5–14.5)
GLOBULIN SER-MCNC: 4.4 GM/ML (ref 2.3–3.5)
GLUCOSE SERPL-MCNC: 112 MG/DL (ref 74–106)
HCT VFR BLD AUTO: 32.6 % (ref 40–51)
HGB BLD-MCNC: 10.3 GM/DL (ref 13.5–17.5)
IMM GRANULOCYTES # BLD AUTO: 0.11 10*3/UL
IMM GRANULOCYTES NFR BLD AUTO: 3 %
LYMPHOCYTES # BLD AUTO: 1.8 X10(3)/MCL (ref 0.6–4.6)
LYMPHOCYTES NFR BLD AUTO: 56 %
MAGNESIUM SERPL-MCNC: 1.7 MG/DL (ref 1.6–2.6)
MCH RBC QN AUTO: 28.1 PG (ref 26–34)
MCHC RBC AUTO-ENTMCNC: 31.6 GM/DL (ref 31–37)
MCV RBC AUTO: 89.1 FL (ref 80–100)
MONOCYTES # BLD AUTO: 0.4 X10(3)/MCL (ref 0.1–1.3)
MONOCYTES NFR BLD AUTO: 13 %
NEUTROPHILS # BLD AUTO: 0.73 X10(3)/MCL (ref 2.1–9.2)
NEUTROPHILS NFR BLD AUTO: 22 %
PLATELET # BLD AUTO: 258 X10(3)/MCL (ref 130–400)
PMV BLD AUTO: 9.3 FL (ref 7.4–10.4)
POTASSIUM SERPL-SCNC: 3.9 MMOL/L (ref 3.5–5.1)
PROT SERPL-MCNC: 7.2 GM/DL (ref 6.4–8.2)
PROT UR STRIP-MCNC: 39.5 MG/DL
RBC # BLD AUTO: 3.66 X10(6)/MCL (ref 4.5–5.9)
SODIUM SERPL-SCNC: 138 MMOL/L (ref 136–145)
WBC # SPEC AUTO: 3.3 X10(3)/MCL (ref 4.5–11)

## 2020-06-01 ENCOUNTER — HISTORICAL (OUTPATIENT)
Dept: INFUSION THERAPY | Facility: HOSPITAL | Age: 40
End: 2020-06-01

## 2020-06-01 LAB
ABS NEUT (OLG): 2.3 X10(3)/MCL (ref 2.1–9.2)
ALBUMIN SERPL-MCNC: 2.6 GM/DL (ref 3.4–5)
ALBUMIN/GLOB SERPL: 0.6 RATIO (ref 1.1–2)
ALP SERPL-CCNC: 132 UNIT/L (ref 45–117)
ALT SERPL-CCNC: 64 UNIT/L (ref 12–78)
AST SERPL-CCNC: 28 UNIT/L (ref 15–37)
BASOPHILS # BLD AUTO: 0 X10(3)/MCL (ref 0–0.2)
BASOPHILS NFR BLD AUTO: 0 %
BILIRUB SERPL-MCNC: 0.5 MG/DL (ref 0.2–1)
BILIRUBIN DIRECT+TOT PNL SERPL-MCNC: 0.2 MG/DL (ref 0–0.2)
BILIRUBIN DIRECT+TOT PNL SERPL-MCNC: 0.3 MG/DL
BUN SERPL-MCNC: 7 MG/DL (ref 7–18)
CALCIUM SERPL-MCNC: 8.4 MG/DL (ref 8.5–10.1)
CHLORIDE SERPL-SCNC: 110 MMOL/L (ref 98–107)
CO2 SERPL-SCNC: 28 MMOL/L (ref 21–32)
CREAT SERPL-MCNC: 1.4 MG/DL (ref 0.6–1.3)
EOSINOPHIL # BLD AUTO: 0.1 X10(3)/MCL (ref 0–0.9)
EOSINOPHIL NFR BLD AUTO: 2 %
ERYTHROCYTE [DISTWIDTH] IN BLOOD BY AUTOMATED COUNT: 16.8 % (ref 11.5–14.5)
GLOBULIN SER-MCNC: 4 GM/ML (ref 2.3–3.5)
GLUCOSE SERPL-MCNC: 93 MG/DL (ref 74–106)
HCT VFR BLD AUTO: 31 % (ref 40–51)
HGB BLD-MCNC: 9.6 GM/DL (ref 13.5–17.5)
IMM GRANULOCYTES # BLD AUTO: 0.08 10*3/UL
IMM GRANULOCYTES NFR BLD AUTO: 2 %
LYMPHOCYTES # BLD AUTO: 1.2 X10(3)/MCL (ref 0.6–4.6)
LYMPHOCYTES NFR BLD AUTO: 28 %
MAGNESIUM SERPL-MCNC: 1.6 MG/DL (ref 1.6–2.6)
MCH RBC QN AUTO: 28 PG (ref 26–34)
MCHC RBC AUTO-ENTMCNC: 31 GM/DL (ref 31–37)
MCV RBC AUTO: 90.4 FL (ref 80–100)
MONOCYTES # BLD AUTO: 0.5 X10(3)/MCL (ref 0.1–1.3)
MONOCYTES NFR BLD AUTO: 12 %
NEUTROPHILS # BLD AUTO: 2.3 X10(3)/MCL (ref 2.1–9.2)
NEUTROPHILS NFR BLD AUTO: 56 %
PLATELET # BLD AUTO: 202 X10(3)/MCL (ref 130–400)
PMV BLD AUTO: 9.6 FL (ref 7.4–10.4)
POTASSIUM SERPL-SCNC: 3.8 MMOL/L (ref 3.5–5.1)
PROT SERPL-MCNC: 6.6 GM/DL (ref 6.4–8.2)
PROT UR STRIP-MCNC: 18.4 MG/DL
RBC # BLD AUTO: 3.43 X10(6)/MCL (ref 4.5–5.9)
SODIUM SERPL-SCNC: 142 MMOL/L (ref 136–145)
WBC # SPEC AUTO: 4.1 X10(3)/MCL (ref 4.5–11)

## 2020-06-03 ENCOUNTER — HISTORICAL (OUTPATIENT)
Dept: INFUSION THERAPY | Facility: HOSPITAL | Age: 40
End: 2020-06-03

## 2020-06-12 ENCOUNTER — HISTORICAL (OUTPATIENT)
Dept: RADIOLOGY | Facility: HOSPITAL | Age: 40
End: 2020-06-12

## 2020-06-15 ENCOUNTER — HISTORICAL (OUTPATIENT)
Dept: INFUSION THERAPY | Facility: HOSPITAL | Age: 40
End: 2020-06-15

## 2020-06-15 LAB
ABS NEUT (OLG): 1.39 X10(3)/MCL (ref 2.1–9.2)
ALBUMIN SERPL-MCNC: 3.4 GM/DL (ref 3.4–5)
ALBUMIN/GLOB SERPL: 0.8 RATIO (ref 1.1–2)
ALP SERPL-CCNC: 126 UNIT/L (ref 45–117)
ALT SERPL-CCNC: 56 UNIT/L (ref 12–78)
AST SERPL-CCNC: 33 UNIT/L (ref 15–37)
BASOPHILS # BLD AUTO: 0 X10(3)/MCL (ref 0–0.2)
BASOPHILS NFR BLD AUTO: 1 %
BILIRUB SERPL-MCNC: 1 MG/DL (ref 0.2–1)
BILIRUBIN DIRECT+TOT PNL SERPL-MCNC: 0.3 MG/DL (ref 0–0.2)
BILIRUBIN DIRECT+TOT PNL SERPL-MCNC: 0.7 MG/DL
BUN SERPL-MCNC: 2 MG/DL (ref 7–18)
CALCIUM SERPL-MCNC: 9 MG/DL (ref 8.5–10.1)
CEA SERPL-MCNC: 38.2 NG/ML
CHLORIDE SERPL-SCNC: 101 MMOL/L (ref 98–107)
CO2 SERPL-SCNC: 29 MMOL/L (ref 21–32)
CREAT SERPL-MCNC: 1.3 MG/DL (ref 0.6–1.3)
EOSINOPHIL # BLD AUTO: 0.1 X10(3)/MCL (ref 0–0.9)
EOSINOPHIL NFR BLD AUTO: 3 %
ERYTHROCYTE [DISTWIDTH] IN BLOOD BY AUTOMATED COUNT: 17 % (ref 11.5–14.5)
FERRITIN SERPL-MCNC: 375.1 NG/ML (ref 26–388)
GLOBULIN SER-MCNC: 4.3 GM/ML (ref 2.3–3.5)
GLUCOSE SERPL-MCNC: 104 MG/DL (ref 74–106)
HCT VFR BLD AUTO: 34.5 % (ref 40–51)
HGB BLD-MCNC: 10.9 GM/DL (ref 13.5–17.5)
IMM GRANULOCYTES # BLD AUTO: 0.01 10*3/UL
IMM GRANULOCYTES NFR BLD AUTO: 0 %
IRON SATN MFR SERPL: 19.5 % (ref 15–50)
IRON SERPL-MCNC: 57 MCG/DL (ref 65–175)
LYMPHOCYTES # BLD AUTO: 1.6 X10(3)/MCL (ref 0.6–4.6)
LYMPHOCYTES NFR BLD AUTO: 44 %
MAGNESIUM SERPL-MCNC: 1.7 MG/DL (ref 1.6–2.6)
MCH RBC QN AUTO: 28.2 PG (ref 26–34)
MCHC RBC AUTO-ENTMCNC: 31.6 GM/DL (ref 31–37)
MCV RBC AUTO: 89.1 FL (ref 80–100)
MONOCYTES # BLD AUTO: 0.5 X10(3)/MCL (ref 0.1–1.3)
MONOCYTES NFR BLD AUTO: 13 %
NEUTROPHILS # BLD AUTO: 1.39 X10(3)/MCL (ref 2.1–9.2)
NEUTROPHILS NFR BLD AUTO: 39 %
PLATELET # BLD AUTO: 214 X10(3)/MCL (ref 130–400)
PMV BLD AUTO: 9.3 FL (ref 7.4–10.4)
POTASSIUM SERPL-SCNC: 3.3 MMOL/L (ref 3.5–5.1)
PROT SERPL-MCNC: 7.7 GM/DL (ref 6.4–8.2)
PROT UR STRIP-MCNC: 20.6 MG/DL
RBC # BLD AUTO: 3.87 X10(6)/MCL (ref 4.5–5.9)
SODIUM SERPL-SCNC: 136 MMOL/L (ref 136–145)
TIBC SERPL-MCNC: 292 MCG/DL (ref 250–450)
TRANSFERRIN SERPL-MCNC: 222 MG/DL (ref 200–360)
WBC # SPEC AUTO: 3.6 X10(3)/MCL (ref 4.5–11)

## 2020-06-17 ENCOUNTER — HISTORICAL (OUTPATIENT)
Dept: INFUSION THERAPY | Facility: HOSPITAL | Age: 40
End: 2020-06-17

## 2020-06-21 ENCOUNTER — HOSPITAL ENCOUNTER (OUTPATIENT)
Dept: INTENSIVE CARE | Facility: HOSPITAL | Age: 40
End: 2020-06-23
Attending: INTERNAL MEDICINE | Admitting: INTERNAL MEDICINE

## 2020-06-21 LAB
ABS NEUT (OLG): 0.32 X10(3)/MCL (ref 2.1–9.2)
ALBUMIN SERPL-MCNC: 3.1 GM/DL (ref 3.4–5)
ALBUMIN/GLOB SERPL: 0.7 RATIO (ref 1.1–2)
ALP SERPL-CCNC: 152 UNIT/L (ref 45–117)
ALT SERPL-CCNC: 443 UNIT/L (ref 12–78)
AMYLASE SERPL-CCNC: 72 UNIT/L (ref 25–115)
ANISOCYTOSIS BLD QL SMEAR: ABNORMAL
APPEARANCE, UA: CLEAR
AST SERPL-CCNC: 246 UNIT/L (ref 15–37)
BACTERIA #/AREA URNS AUTO: ABNORMAL /HPF
BASOPHILS NFR BLD MANUAL: 0 %
BILIRUB SERPL-MCNC: 2.4 MG/DL (ref 0.2–1)
BILIRUB UR QL STRIP: 0.5 MG/DL
BILIRUBIN DIRECT+TOT PNL SERPL-MCNC: 1.2 MG/DL
BILIRUBIN DIRECT+TOT PNL SERPL-MCNC: 1.2 MG/DL (ref 0–0.2)
BUN SERPL-MCNC: 13 MG/DL (ref 7–18)
C DIFF INTERP: NEGATIVE
CALCIUM SERPL-MCNC: 8.9 MG/DL (ref 8.5–10.1)
CHLORIDE SERPL-SCNC: 91 MMOL/L (ref 98–107)
CO2 SERPL-SCNC: 27 MMOL/L (ref 21–32)
COLOR UR: YELLOW
CREAT SERPL-MCNC: 1.2 MG/DL (ref 0.6–1.3)
EOSINOPHIL NFR BLD MANUAL: 0 %
ERYTHROCYTE [DISTWIDTH] IN BLOOD BY AUTOMATED COUNT: 15.5 % (ref 11.5–14.5)
EST. AVERAGE GLUCOSE BLD GHB EST-MCNC: 114 MG/DL
GLOBULIN SER-MCNC: 4.5 GM/ML (ref 2.3–3.5)
GLUCOSE (UA): NEGATIVE
GLUCOSE SERPL-MCNC: 127 MG/DL (ref 74–106)
GRANULOCYTES NFR BLD MANUAL: 8 % (ref 43–75)
HBA1C MFR BLD: 5.6 % (ref 4.2–6.3)
HCT VFR BLD AUTO: 34 % (ref 40–51)
HGB BLD-MCNC: 11.2 GM/DL (ref 13.5–17.5)
HGB UR QL STRIP: NEGATIVE
HYALINE CASTS #/AREA URNS LPF: ABNORMAL /LPF
KETONES UR QL STRIP: 20 MG/DL
LACTATE SERPL-SCNC: 1.7 MMOL/L (ref 0.4–2)
LACTATE SERPL-SCNC: 2.6 MMOL/L (ref 0.4–2)
LEUKOCYTE ESTERASE UR QL STRIP: NEGATIVE
LIPASE SERPL-CCNC: 120 UNIT/L (ref 73–393)
LYMPHOCYTES NFR BLD MANUAL: 4 %
LYMPHOCYTES NFR BLD MANUAL: 60 % (ref 20.5–51.1)
MAGNESIUM SERPL-MCNC: 1.7 MG/DL (ref 1.6–2.6)
MCH RBC QN AUTO: 27.8 PG (ref 26–34)
MCHC RBC AUTO-ENTMCNC: 32.9 GM/DL (ref 31–37)
MCV RBC AUTO: 84.4 FL (ref 80–100)
MONOCYTES NFR BLD MANUAL: 4 % (ref 2–9)
NEUTS BAND NFR BLD MANUAL: 24 % (ref 0–10)
NITRITE UR QL STRIP: NEGATIVE
OVALOCYTES BLD QL SMEAR: ABNORMAL
PH UR STRIP: 6 [PH] (ref 4.5–8)
PHOSPHATE SERPL-MCNC: 2.8 MG/DL (ref 2.5–4.9)
PLATELET # BLD AUTO: 175 X10(3)/MCL (ref 130–400)
PLATELET # BLD EST: ADEQUATE 10*3/UL
PMV BLD AUTO: 10.9 FL (ref 7.4–10.4)
POLYCHROMASIA BLD QL SMEAR: ABNORMAL
POTASSIUM SERPL-SCNC: 2.8 MMOL/L (ref 3.5–5.1)
PROT SERPL-MCNC: 7.6 GM/DL (ref 6.4–8.2)
PROT UR QL STRIP: 100 MG/DL
RBC # BLD AUTO: 4.03 X10(6)/MCL (ref 4.5–5.9)
RBC #/AREA URNS AUTO: ABNORMAL /HPF
RBC MORPH BLD: ABNORMAL
SARS-COV-2 AG RESP QL IA.RAPID: NEGATIVE
SODIUM SERPL-SCNC: 129 MMOL/L (ref 136–145)
SP GR UR STRIP: 1.02 (ref 1–1.03)
SQUAMOUS #/AREA URNS LPF: ABNORMAL /LPF
T4 FREE SERPL-MCNC: 1.15 NG/DL (ref 0.76–1.46)
TSH SERPL-ACNC: 8.36 MIU/L (ref 0.36–3.74)
UROBILINOGEN UR STRIP-ACNC: 3 MG/DL
WBC # SPEC AUTO: 1 X10(3)/MCL (ref 4.5–11)
WBC #/AREA URNS AUTO: ABNORMAL /HPF

## 2020-06-22 LAB
ABS NEUT (OLG): 0.5 X10(3)/MCL (ref 2.1–9.2)
ALBUMIN SERPL-MCNC: 2.7 GM/DL (ref 3.4–5)
ALBUMIN/GLOB SERPL: 0.7 RATIO (ref 1.1–2)
ALP SERPL-CCNC: 126 UNIT/L (ref 45–117)
ALT SERPL-CCNC: 370 UNIT/L (ref 12–78)
ANISOCYTOSIS BLD QL SMEAR: ABNORMAL
APPEARANCE, UA: CLEAR
AST SERPL-CCNC: 153 UNIT/L (ref 15–37)
BACTERIA #/AREA URNS AUTO: ABNORMAL /HPF
BASOPHILS NFR BLD MANUAL: 2 %
BILIRUB SERPL-MCNC: 1.2 MG/DL (ref 0.2–1)
BILIRUB UR QL STRIP: NEGATIVE
BILIRUBIN DIRECT+TOT PNL SERPL-MCNC: 0.5 MG/DL (ref 0–0.2)
BILIRUBIN DIRECT+TOT PNL SERPL-MCNC: 0.7 MG/DL
BUN SERPL-MCNC: 10 MG/DL (ref 7–18)
CALCIUM SERPL-MCNC: 8.5 MG/DL (ref 8.5–10.1)
CHLORIDE SERPL-SCNC: 99 MMOL/L (ref 98–107)
CO2 SERPL-SCNC: 25 MMOL/L (ref 21–32)
COLOR UR: YELLOW
CREAT SERPL-MCNC: 1 MG/DL (ref 0.6–1.3)
EOSINOPHIL NFR BLD MANUAL: 4 %
ERYTHROCYTE [DISTWIDTH] IN BLOOD BY AUTOMATED COUNT: 15.5 % (ref 11.5–14.5)
GLOBULIN SER-MCNC: 4 GM/ML (ref 2.3–3.5)
GLUCOSE (UA): NEGATIVE
GLUCOSE SERPL-MCNC: 104 MG/DL (ref 74–106)
GRANULOCYTES NFR BLD MANUAL: 23 % (ref 43–75)
HAV IGM SERPL QL IA: NONREACTIVE
HBV CORE IGM SERPL QL IA: NONREACTIVE
HBV SURFACE AG SERPL QL IA: NONREACTIVE
HCT VFR BLD AUTO: 30.6 % (ref 40–51)
HCV AB SERPL QL IA: NONREACTIVE
HGB BLD-MCNC: 10.1 GM/DL (ref 13.5–17.5)
HGB UR QL STRIP: NEGATIVE
HIV 1+2 AB+HIV1 P24 AG SERPL QL IA: NONREACTIVE
HYALINE CASTS #/AREA URNS LPF: ABNORMAL /LPF
KETONES UR QL STRIP: 10 MG/DL
LEUKOCYTE ESTERASE UR QL STRIP: NEGATIVE
LYMPHOCYTES NFR BLD MANUAL: 49 % (ref 20.5–51.1)
MAGNESIUM SERPL-MCNC: 2 MG/DL (ref 1.6–2.6)
MCH RBC QN AUTO: 28.2 PG (ref 26–34)
MCHC RBC AUTO-ENTMCNC: 33 GM/DL (ref 31–37)
MCV RBC AUTO: 85.5 FL (ref 80–100)
MONOCYTES NFR BLD MANUAL: 9 % (ref 2–9)
NEUTS BAND NFR BLD MANUAL: 13 % (ref 0–10)
NITRITE UR QL STRIP: NEGATIVE
OVALOCYTES BLD QL SMEAR: ABNORMAL
PH UR STRIP: 6 [PH] (ref 4.5–8)
PHOSPHATE SERPL-MCNC: 1.4 MG/DL (ref 2.5–4.9)
PLATELET # BLD AUTO: 131 X10(3)/MCL (ref 130–400)
PLATELET # BLD EST: ADEQUATE 10*3/UL
PMV BLD AUTO: 10.9 FL (ref 7.4–10.4)
POIKILOCYTOSIS BLD QL SMEAR: ABNORMAL
POTASSIUM SERPL-SCNC: 3.2 MMOL/L (ref 3.5–5.1)
PROT SERPL-MCNC: 6.7 GM/DL (ref 6.4–8.2)
PROT UR QL STRIP: 100 MG/DL
RBC # BLD AUTO: 3.58 X10(6)/MCL (ref 4.5–5.9)
RBC #/AREA URNS AUTO: ABNORMAL /HPF
RBC MORPH BLD: ABNORMAL
SODIUM SERPL-SCNC: 134 MMOL/L (ref 136–145)
SP GR UR STRIP: 1.03 (ref 1–1.03)
SQUAMOUS #/AREA URNS LPF: ABNORMAL /LPF
UROBILINOGEN UR STRIP-ACNC: NORMAL
WBC # SPEC AUTO: 1.3 X10(3)/MCL (ref 4.5–11)
WBC #/AREA URNS AUTO: ABNORMAL /HPF

## 2020-06-23 LAB
ABS NEUT (OLG): 0.52 X10(3)/MCL (ref 2.1–9.2)
ALBUMIN SERPL-MCNC: 2.3 GM/DL (ref 3.4–5)
ALBUMIN/GLOB SERPL: 0.6 RATIO (ref 1.1–2)
ALP SERPL-CCNC: 98 UNIT/L (ref 45–117)
ALT SERPL-CCNC: 276 UNIT/L (ref 12–78)
ANISOCYTOSIS BLD QL SMEAR: ABNORMAL
AST SERPL-CCNC: 85 UNIT/L (ref 15–37)
BASOPHILS NFR BLD MANUAL: 4 %
BILIRUB SERPL-MCNC: 0.6 MG/DL (ref 0.2–1)
BILIRUBIN DIRECT+TOT PNL SERPL-MCNC: 0.3 MG/DL
BILIRUBIN DIRECT+TOT PNL SERPL-MCNC: 0.3 MG/DL (ref 0–0.2)
BUN SERPL-MCNC: 5 MG/DL (ref 7–18)
BUN SERPL-MCNC: 6 MG/DL (ref 7–18)
CALCIUM SERPL-MCNC: 7.8 MG/DL (ref 8.5–10.1)
CALCIUM SERPL-MCNC: 8.2 MG/DL (ref 8.5–10.1)
CHLORIDE SERPL-SCNC: 104 MMOL/L (ref 98–107)
CHLORIDE SERPL-SCNC: 104 MMOL/L (ref 98–107)
CO2 SERPL-SCNC: 25 MMOL/L (ref 21–32)
CO2 SERPL-SCNC: 26 MMOL/L (ref 21–32)
CREAT SERPL-MCNC: 0.9 MG/DL (ref 0.6–1.3)
CREAT SERPL-MCNC: 1 MG/DL (ref 0.6–1.3)
CREAT/UREA NIT SERPL: 5
EOSINOPHIL NFR BLD MANUAL: 2 %
ERYTHROCYTE [DISTWIDTH] IN BLOOD BY AUTOMATED COUNT: 15.6 % (ref 11.5–14.5)
GLOBULIN SER-MCNC: 3.7 GM/ML (ref 2.3–3.5)
GLUCOSE SERPL-MCNC: 105 MG/DL (ref 74–106)
GLUCOSE SERPL-MCNC: 94 MG/DL (ref 74–106)
GRANULOCYTES NFR BLD MANUAL: 20 % (ref 43–75)
HCT VFR BLD AUTO: 28.3 % (ref 40–51)
HGB BLD-MCNC: 9.3 GM/DL (ref 13.5–17.5)
HYPOCHROMIA BLD QL SMEAR: ABNORMAL
LYMPHOCYTES NFR BLD MANUAL: 68 % (ref 20.5–51.1)
MAGNESIUM SERPL-MCNC: 1.9 MG/DL (ref 1.6–2.6)
MAGNESIUM SERPL-MCNC: 2.6 MG/DL (ref 1.6–2.6)
MCH RBC QN AUTO: 28.3 PG (ref 26–34)
MCHC RBC AUTO-ENTMCNC: 32.9 GM/DL (ref 31–37)
MCV RBC AUTO: 86 FL (ref 80–100)
MICROCYTES BLD QL SMEAR: ABNORMAL
MONOCYTES NFR BLD MANUAL: 4 % (ref 2–9)
NEUTS BAND NFR BLD MANUAL: 2 % (ref 0–10)
PHOSPHATE SERPL-MCNC: 1.1 MG/DL (ref 2.5–4.9)
PHOSPHATE SERPL-MCNC: 1.4 MG/DL (ref 2.5–4.9)
PLATELET # BLD AUTO: 118 X10(3)/MCL (ref 130–400)
PLATELET # BLD EST: ABNORMAL 10*3/UL
PMV BLD AUTO: 11 FL (ref 7.4–10.4)
POIKILOCYTOSIS BLD QL SMEAR: ABNORMAL
POTASSIUM SERPL-SCNC: 2.8 MMOL/L (ref 3.5–5.1)
POTASSIUM SERPL-SCNC: 3.4 MMOL/L (ref 3.5–5.1)
PROT SERPL-MCNC: 6 GM/DL (ref 6.4–8.2)
RBC # BLD AUTO: 3.29 X10(6)/MCL (ref 4.5–5.9)
RBC MORPH BLD: ABNORMAL
SCHISTOCYTES BLD QL AUTO: ABNORMAL
SODIUM SERPL-SCNC: 134 MMOL/L (ref 136–145)
SODIUM SERPL-SCNC: 136 MMOL/L (ref 136–145)
VANCOMYCIN SERPL-MCNC: 18.4 MCG/ML
WBC # SPEC AUTO: 1.8 X10(3)/MCL (ref 4.5–11)

## 2020-06-24 LAB — FINAL CULTURE: NORMAL

## 2020-06-25 LAB — FINAL CULTURE: NO GROWTH

## 2020-06-26 LAB — FINAL CULTURE: NORMAL

## 2020-06-29 ENCOUNTER — HISTORICAL (OUTPATIENT)
Dept: ADMINISTRATIVE | Facility: HOSPITAL | Age: 40
End: 2020-06-29

## 2020-06-29 LAB
ABS NEUT (OLG): 0.59 X10(3)/MCL (ref 2.1–9.2)
ALBUMIN SERPL-MCNC: 2.9 GM/DL (ref 3.4–5)
ALBUMIN/GLOB SERPL: 0.8 RATIO (ref 1.1–2)
ALP SERPL-CCNC: 96 UNIT/L (ref 45–117)
ALT SERPL-CCNC: 98 UNIT/L (ref 12–78)
AST SERPL-CCNC: 53 UNIT/L (ref 15–37)
BASOPHILS # BLD AUTO: 0 X10(3)/MCL (ref 0–0.2)
BASOPHILS NFR BLD AUTO: 0 %
BILIRUB SERPL-MCNC: 0.5 MG/DL (ref 0.2–1)
BILIRUBIN DIRECT+TOT PNL SERPL-MCNC: 0.2 MG/DL (ref 0–0.2)
BILIRUBIN DIRECT+TOT PNL SERPL-MCNC: 0.3 MG/DL
BUN SERPL-MCNC: 4 MG/DL (ref 7–18)
CALCIUM SERPL-MCNC: 8 MG/DL (ref 8.5–10.1)
CHLORIDE SERPL-SCNC: 108 MMOL/L (ref 98–107)
CO2 SERPL-SCNC: 27 MMOL/L (ref 21–32)
CREAT SERPL-MCNC: 1.2 MG/DL (ref 0.6–1.3)
EOSINOPHIL # BLD AUTO: 0 X10(3)/MCL (ref 0–0.9)
EOSINOPHIL NFR BLD AUTO: 2 %
ERYTHROCYTE [DISTWIDTH] IN BLOOD BY AUTOMATED COUNT: 17.3 % (ref 11.5–14.5)
GLOBULIN SER-MCNC: 3.5 GM/ML (ref 2.3–3.5)
GLUCOSE SERPL-MCNC: 100 MG/DL (ref 74–106)
HCT VFR BLD AUTO: 30.1 % (ref 40–51)
HGB BLD-MCNC: 9.5 GM/DL (ref 13.5–17.5)
IMM GRANULOCYTES # BLD AUTO: 0.01 10*3/UL
IMM GRANULOCYTES NFR BLD AUTO: 0 %
LYMPHOCYTES # BLD AUTO: 1.5 X10(3)/MCL (ref 0.6–4.6)
LYMPHOCYTES NFR BLD AUTO: 58 %
MAGNESIUM SERPL-MCNC: 1.6 MG/DL (ref 1.6–2.6)
MCH RBC QN AUTO: 27.9 PG (ref 26–34)
MCHC RBC AUTO-ENTMCNC: 31.6 GM/DL (ref 31–37)
MCV RBC AUTO: 88.3 FL (ref 80–100)
MONOCYTES # BLD AUTO: 0.4 X10(3)/MCL (ref 0.1–1.3)
MONOCYTES NFR BLD AUTO: 16 %
NEUTROPHILS # BLD AUTO: 0.59 X10(3)/MCL (ref 2.1–9.2)
NEUTROPHILS NFR BLD AUTO: 24 %
PLATELET # BLD AUTO: 189 X10(3)/MCL (ref 130–400)
PMV BLD AUTO: 9.8 FL (ref 7.4–10.4)
POTASSIUM SERPL-SCNC: 3.4 MMOL/L (ref 3.5–5.1)
PROT SERPL-MCNC: 6.4 GM/DL (ref 6.4–8.2)
RBC # BLD AUTO: 3.41 X10(6)/MCL (ref 4.5–5.9)
SODIUM SERPL-SCNC: 141 MMOL/L (ref 136–145)
WBC # SPEC AUTO: 2.5 X10(3)/MCL (ref 4.5–11)

## 2020-07-06 ENCOUNTER — HISTORICAL (OUTPATIENT)
Dept: INFUSION THERAPY | Facility: HOSPITAL | Age: 40
End: 2020-07-06

## 2020-07-06 LAB
ABS NEUT (OLG): 1.87 X10(3)/MCL (ref 2.1–9.2)
ALBUMIN SERPL-MCNC: 2.7 GM/DL (ref 3.4–5)
ALBUMIN/GLOB SERPL: 0.8 RATIO (ref 1.1–2)
ALP SERPL-CCNC: 76 UNIT/L (ref 45–117)
ALT SERPL-CCNC: 40 UNIT/L (ref 12–78)
AST SERPL-CCNC: 28 UNIT/L (ref 15–37)
BASOPHILS # BLD AUTO: 0 X10(3)/MCL (ref 0–0.2)
BASOPHILS NFR BLD AUTO: 1 %
BILIRUB SERPL-MCNC: 0.5 MG/DL (ref 0.2–1)
BILIRUBIN DIRECT+TOT PNL SERPL-MCNC: 0.2 MG/DL (ref 0–0.2)
BILIRUBIN DIRECT+TOT PNL SERPL-MCNC: 0.3 MG/DL
BUN SERPL-MCNC: 5 MG/DL (ref 7–18)
CALCIUM SERPL-MCNC: 7.5 MG/DL (ref 8.5–10.1)
CHLORIDE SERPL-SCNC: 108 MMOL/L (ref 98–107)
CO2 SERPL-SCNC: 27 MMOL/L (ref 21–32)
CREAT SERPL-MCNC: 1.2 MG/DL (ref 0.6–1.3)
EOSINOPHIL # BLD AUTO: 0 X10(3)/MCL (ref 0–0.9)
EOSINOPHIL NFR BLD AUTO: 1 %
ERYTHROCYTE [DISTWIDTH] IN BLOOD BY AUTOMATED COUNT: 19.9 % (ref 11.5–14.5)
GLOBULIN SER-MCNC: 3.3 GM/ML (ref 2.3–3.5)
GLUCOSE SERPL-MCNC: 92 MG/DL (ref 74–106)
HCT VFR BLD AUTO: 28.4 % (ref 40–51)
HGB BLD-MCNC: 8.9 GM/DL (ref 13.5–17.5)
IMM GRANULOCYTES # BLD AUTO: 0.01 10*3/UL
IMM GRANULOCYTES NFR BLD AUTO: 0 %
LYMPHOCYTES # BLD AUTO: 1.3 X10(3)/MCL (ref 0.6–4.6)
LYMPHOCYTES NFR BLD AUTO: 36 %
MAGNESIUM SERPL-MCNC: 1.2 MG/DL (ref 1.6–2.6)
MCH RBC QN AUTO: 28.8 PG (ref 26–34)
MCHC RBC AUTO-ENTMCNC: 31.3 GM/DL (ref 31–37)
MCV RBC AUTO: 91.9 FL (ref 80–100)
MONOCYTES # BLD AUTO: 0.4 X10(3)/MCL (ref 0.1–1.3)
MONOCYTES NFR BLD AUTO: 10 %
NEUTROPHILS # BLD AUTO: 1.87 X10(3)/MCL (ref 2.1–9.2)
NEUTROPHILS NFR BLD AUTO: 51 %
PLATELET # BLD AUTO: 172 X10(3)/MCL (ref 130–400)
PMV BLD AUTO: 9 FL (ref 7.4–10.4)
POTASSIUM SERPL-SCNC: 2.8 MMOL/L (ref 3.5–5.1)
PROT SERPL-MCNC: 6 GM/DL (ref 6.4–8.2)
PROT UR STRIP-MCNC: 25.6 MG/DL
RBC # BLD AUTO: 3.09 X10(6)/MCL (ref 4.5–5.9)
SODIUM SERPL-SCNC: 142 MMOL/L (ref 136–145)
WBC # SPEC AUTO: 3.6 X10(3)/MCL (ref 4.5–11)

## 2020-07-08 ENCOUNTER — HISTORICAL (OUTPATIENT)
Dept: INFUSION THERAPY | Facility: HOSPITAL | Age: 40
End: 2020-07-08

## 2020-07-20 ENCOUNTER — HISTORICAL (OUTPATIENT)
Dept: ADMINISTRATIVE | Facility: HOSPITAL | Age: 40
End: 2020-07-20

## 2020-07-20 LAB
ABS NEUT (OLG): 1.87 X10(3)/MCL (ref 2.1–9.2)
ALBUMIN SERPL-MCNC: 3 GM/DL (ref 3.4–5)
ALBUMIN/GLOB SERPL: 0.9 RATIO (ref 1.1–2)
ALP SERPL-CCNC: 102 UNIT/L (ref 45–117)
ALT SERPL-CCNC: 18 UNIT/L (ref 12–78)
AST SERPL-CCNC: 19 UNIT/L (ref 15–37)
BASOPHILS # BLD AUTO: 0 X10(3)/MCL (ref 0–0.2)
BASOPHILS NFR BLD AUTO: 0 %
BILIRUB SERPL-MCNC: 0.7 MG/DL (ref 0.2–1)
BILIRUBIN DIRECT+TOT PNL SERPL-MCNC: 0.2 MG/DL (ref 0–0.2)
BILIRUBIN DIRECT+TOT PNL SERPL-MCNC: 0.5 MG/DL
BUN SERPL-MCNC: 6 MG/DL (ref 7–18)
CALCIUM SERPL-MCNC: 8.4 MG/DL (ref 8.5–10.1)
CEA SERPL-MCNC: 46.9 NG/ML
CHLORIDE SERPL-SCNC: 106 MMOL/L (ref 98–107)
CO2 SERPL-SCNC: 30 MMOL/L (ref 21–32)
CREAT SERPL-MCNC: 1.5 MG/DL (ref 0.6–1.3)
EOSINOPHIL # BLD AUTO: 0 X10(3)/MCL (ref 0–0.9)
EOSINOPHIL NFR BLD AUTO: 1 %
ERYTHROCYTE [DISTWIDTH] IN BLOOD BY AUTOMATED COUNT: 18.6 % (ref 11.5–14.5)
GLOBULIN SER-MCNC: 3.5 GM/ML (ref 2.3–3.5)
GLUCOSE SERPL-MCNC: 100 MG/DL (ref 74–106)
HCT VFR BLD AUTO: 31 % (ref 40–51)
HGB BLD-MCNC: 9.5 GM/DL (ref 13.5–17.5)
IMM GRANULOCYTES # BLD AUTO: 0.01 10*3/UL
IMM GRANULOCYTES NFR BLD AUTO: 0 %
LYMPHOCYTES # BLD AUTO: 1.4 X10(3)/MCL (ref 0.6–4.6)
LYMPHOCYTES NFR BLD AUTO: 38 %
MAGNESIUM SERPL-MCNC: 1.9 MG/DL (ref 1.6–2.6)
MCH RBC QN AUTO: 28.6 PG (ref 26–34)
MCHC RBC AUTO-ENTMCNC: 30.6 GM/DL (ref 31–37)
MCV RBC AUTO: 93.4 FL (ref 80–100)
MONOCYTES # BLD AUTO: 0.4 X10(3)/MCL (ref 0.1–1.3)
MONOCYTES NFR BLD AUTO: 11 %
NEUTROPHILS # BLD AUTO: 1.87 X10(3)/MCL (ref 2.1–9.2)
NEUTROPHILS NFR BLD AUTO: 49 %
PLATELET # BLD AUTO: 201 X10(3)/MCL (ref 130–400)
PMV BLD AUTO: 10.1 FL (ref 7.4–10.4)
POTASSIUM SERPL-SCNC: 3.3 MMOL/L (ref 3.5–5.1)
PROT SERPL-MCNC: 6.5 GM/DL (ref 6.4–8.2)
PROT UR STRIP-MCNC: 25 MG/DL
RBC # BLD AUTO: 3.32 X10(6)/MCL (ref 4.5–5.9)
SODIUM SERPL-SCNC: 141 MMOL/L (ref 136–145)
WBC # SPEC AUTO: 3.8 X10(3)/MCL (ref 4.5–11)

## 2020-07-27 ENCOUNTER — HISTORICAL (OUTPATIENT)
Dept: INFUSION THERAPY | Facility: HOSPITAL | Age: 40
End: 2020-07-27

## 2020-07-27 LAB
ABS NEUT (OLG): 1.64 X10(3)/MCL (ref 2.1–9.2)
ALBUMIN SERPL-MCNC: 3 GM/DL (ref 3.4–5)
ALBUMIN/GLOB SERPL: 0.8 RATIO (ref 1.1–2)
ALP SERPL-CCNC: 87 UNIT/L (ref 45–117)
ALT SERPL-CCNC: 16 UNIT/L (ref 12–78)
AST SERPL-CCNC: 24 UNIT/L (ref 15–37)
BASOPHILS # BLD AUTO: 0 X10(3)/MCL (ref 0–0.2)
BASOPHILS NFR BLD AUTO: 1 %
BILIRUB SERPL-MCNC: 0.6 MG/DL (ref 0.2–1)
BILIRUBIN DIRECT+TOT PNL SERPL-MCNC: 0.2 MG/DL (ref 0–0.2)
BILIRUBIN DIRECT+TOT PNL SERPL-MCNC: 0.4 MG/DL
BUN SERPL-MCNC: 7 MG/DL (ref 7–18)
CALCIUM SERPL-MCNC: 8.8 MG/DL (ref 8.5–10.1)
CHLORIDE SERPL-SCNC: 106 MMOL/L (ref 98–107)
CO2 SERPL-SCNC: 29 MMOL/L (ref 21–32)
CREAT SERPL-MCNC: 1.8 MG/DL (ref 0.6–1.3)
EOSINOPHIL # BLD AUTO: 0 X10(3)/MCL (ref 0–0.9)
EOSINOPHIL NFR BLD AUTO: 1 %
ERYTHROCYTE [DISTWIDTH] IN BLOOD BY AUTOMATED COUNT: 18.4 % (ref 11.5–14.5)
GLOBULIN SER-MCNC: 3.7 GM/ML (ref 2.3–3.5)
GLUCOSE SERPL-MCNC: 104 MG/DL (ref 74–106)
HCT VFR BLD AUTO: 31.5 % (ref 40–51)
HGB BLD-MCNC: 9.5 GM/DL (ref 13.5–17.5)
IMM GRANULOCYTES # BLD AUTO: 0.01 10*3/UL
IMM GRANULOCYTES NFR BLD AUTO: 0 %
LYMPHOCYTES # BLD AUTO: 1.3 X10(3)/MCL (ref 0.6–4.6)
LYMPHOCYTES NFR BLD AUTO: 38 %
MAGNESIUM SERPL-MCNC: 1.7 MG/DL (ref 1.6–2.6)
MCH RBC QN AUTO: 28.4 PG (ref 26–34)
MCHC RBC AUTO-ENTMCNC: 30.2 GM/DL (ref 31–37)
MCV RBC AUTO: 94.3 FL (ref 80–100)
MONOCYTES # BLD AUTO: 0.5 X10(3)/MCL (ref 0.1–1.3)
MONOCYTES NFR BLD AUTO: 14 %
NEUTROPHILS # BLD AUTO: 1.64 X10(3)/MCL (ref 2.1–9.2)
NEUTROPHILS NFR BLD AUTO: 46 %
PLATELET # BLD AUTO: 143 X10(3)/MCL (ref 130–400)
PMV BLD AUTO: 9.2 FL (ref 7.4–10.4)
POTASSIUM SERPL-SCNC: 3.4 MMOL/L (ref 3.5–5.1)
PROT SERPL-MCNC: 6.7 GM/DL (ref 6.4–8.2)
PROT UR STRIP-MCNC: 36.8 MG/DL
RBC # BLD AUTO: 3.34 X10(6)/MCL (ref 4.5–5.9)
SODIUM SERPL-SCNC: 141 MMOL/L (ref 136–145)
WBC # SPEC AUTO: 3.5 X10(3)/MCL (ref 4.5–11)

## 2020-07-29 ENCOUNTER — HISTORICAL (OUTPATIENT)
Dept: INFUSION THERAPY | Facility: HOSPITAL | Age: 40
End: 2020-07-29

## 2020-08-10 ENCOUNTER — HISTORICAL (OUTPATIENT)
Dept: ADMINISTRATIVE | Facility: HOSPITAL | Age: 40
End: 2020-08-10

## 2020-08-10 LAB
ABS NEUT (OLG): 1.01 X10(3)/MCL (ref 2.1–9.2)
ALBUMIN SERPL-MCNC: 3.1 GM/DL (ref 3.4–5)
ALBUMIN/GLOB SERPL: 0.8 RATIO (ref 1.1–2)
ALP SERPL-CCNC: 83 UNIT/L (ref 45–117)
ALT SERPL-CCNC: 16 UNIT/L (ref 12–78)
AST SERPL-CCNC: 22 UNIT/L (ref 15–37)
BASOPHILS # BLD AUTO: 0 X10(3)/MCL (ref 0–0.2)
BASOPHILS NFR BLD AUTO: 0 %
BILIRUB SERPL-MCNC: 0.4 MG/DL (ref 0.2–1)
BUN SERPL-MCNC: 8 MG/DL (ref 7–18)
CALCIUM SERPL-MCNC: 8.5 MG/DL (ref 8.5–10.1)
CEA SERPL-MCNC: 43.1 NG/ML
CHLORIDE SERPL-SCNC: 107 MMOL/L (ref 98–107)
CO2 SERPL-SCNC: 28 MMOL/L (ref 21–32)
CREAT SERPL-MCNC: 1.8 MG/DL (ref 0.6–1.3)
EOSINOPHIL # BLD AUTO: 0 X10(3)/MCL (ref 0–0.9)
EOSINOPHIL NFR BLD AUTO: 1 %
ERYTHROCYTE [DISTWIDTH] IN BLOOD BY AUTOMATED COUNT: 16.9 % (ref 11.5–14.5)
GLOBULIN SER-MCNC: 3.8 GM/ML (ref 2.3–3.5)
GLUCOSE SERPL-MCNC: 79 MG/DL (ref 74–106)
HCT VFR BLD AUTO: 31.4 % (ref 40–51)
HGB BLD-MCNC: 9.7 GM/DL (ref 13.5–17.5)
LYMPHOCYTES # BLD AUTO: 1.2 X10(3)/MCL (ref 0.6–4.6)
LYMPHOCYTES NFR BLD AUTO: 49 %
MAGNESIUM SERPL-MCNC: 2 MG/DL (ref 1.6–2.6)
MCH RBC QN AUTO: 28.2 PG (ref 26–34)
MCHC RBC AUTO-ENTMCNC: 30.9 GM/DL (ref 31–37)
MCV RBC AUTO: 91.3 FL (ref 80–100)
MONOCYTES # BLD AUTO: 0.2 X10(3)/MCL (ref 0.1–1.3)
MONOCYTES NFR BLD AUTO: 9 %
NEUTROPHILS # BLD AUTO: 1.01 X10(3)/MCL (ref 2.1–9.2)
NEUTROPHILS NFR BLD AUTO: 41 %
PLATELET # BLD AUTO: 182 X10(3)/MCL (ref 130–400)
PMV BLD AUTO: 10.8 FL (ref 7.4–10.4)
POTASSIUM SERPL-SCNC: 3.4 MMOL/L (ref 3.5–5.1)
PROT SERPL-MCNC: 6.9 GM/DL (ref 6.4–8.2)
PROT UR STRIP-MCNC: 64.8 MG/DL
RBC # BLD AUTO: 3.44 X10(6)/MCL (ref 4.5–5.9)
SODIUM SERPL-SCNC: 141 MMOL/L (ref 136–145)
WBC # SPEC AUTO: 2.5 X10(3)/MCL (ref 4.5–11)

## 2020-08-17 ENCOUNTER — HISTORICAL (OUTPATIENT)
Dept: INFUSION THERAPY | Facility: HOSPITAL | Age: 40
End: 2020-08-17

## 2020-08-17 LAB
ABS NEUT (OLG): 0.91 X10(3)/MCL (ref 2.1–9.2)
ALBUMIN SERPL-MCNC: 2.9 GM/DL (ref 3.4–5)
ALBUMIN/GLOB SERPL: 0.8 RATIO (ref 1.1–2)
ALP SERPL-CCNC: 75 UNIT/L (ref 45–117)
ALT SERPL-CCNC: 19 UNIT/L (ref 12–78)
AST SERPL-CCNC: 27 UNIT/L (ref 15–37)
BASOPHILS # BLD AUTO: 0 X10(3)/MCL (ref 0–0.2)
BASOPHILS NFR BLD AUTO: 1 %
BILIRUB SERPL-MCNC: 0.7 MG/DL (ref 0.2–1)
BUN SERPL-MCNC: 8 MG/DL (ref 7–18)
CALCIUM SERPL-MCNC: 8.2 MG/DL (ref 8.5–10.1)
CEA SERPL-MCNC: 49.4 NG/ML
CHLORIDE SERPL-SCNC: 110 MMOL/L (ref 98–107)
CO2 SERPL-SCNC: 27 MMOL/L (ref 21–32)
CREAT SERPL-MCNC: 1.8 MG/DL (ref 0.6–1.3)
EOSINOPHIL # BLD AUTO: 0 X10(3)/MCL (ref 0–0.9)
EOSINOPHIL NFR BLD AUTO: 1 %
ERYTHROCYTE [DISTWIDTH] IN BLOOD BY AUTOMATED COUNT: 17.6 % (ref 11.5–14.5)
GLOBULIN SER-MCNC: 3.6 GM/ML (ref 2.3–3.5)
GLUCOSE SERPL-MCNC: 107 MG/DL (ref 74–106)
HCT VFR BLD AUTO: 30.5 % (ref 40–51)
HGB BLD-MCNC: 9.3 GM/DL (ref 13.5–17.5)
IMM GRANULOCYTES # BLD AUTO: 0.01 10*3/UL
IMM GRANULOCYTES NFR BLD AUTO: 0 %
LYMPHOCYTES # BLD AUTO: 1.4 X10(3)/MCL (ref 0.6–4.6)
LYMPHOCYTES NFR BLD AUTO: 52 %
MAGNESIUM SERPL-MCNC: 1.4 MG/DL (ref 1.6–2.6)
MCH RBC QN AUTO: 28 PG (ref 26–34)
MCHC RBC AUTO-ENTMCNC: 30.5 GM/DL (ref 31–37)
MCV RBC AUTO: 91.9 FL (ref 80–100)
MONOCYTES # BLD AUTO: 0.3 X10(3)/MCL (ref 0.1–1.3)
MONOCYTES NFR BLD AUTO: 12 %
NEUTROPHILS # BLD AUTO: 0.91 X10(3)/MCL (ref 2.1–9.2)
NEUTROPHILS NFR BLD AUTO: 33 %
PLATELET # BLD AUTO: 149 X10(3)/MCL (ref 130–400)
PMV BLD AUTO: 9.7 FL (ref 7.4–10.4)
POTASSIUM SERPL-SCNC: 3.5 MMOL/L (ref 3.5–5.1)
PROT SERPL-MCNC: 6.5 GM/DL (ref 6.4–8.2)
PROT UR STRIP-MCNC: 71 MG/DL
RBC # BLD AUTO: 3.32 X10(6)/MCL (ref 4.5–5.9)
SODIUM SERPL-SCNC: 143 MMOL/L (ref 136–145)
WBC # SPEC AUTO: 2.7 X10(3)/MCL (ref 4.5–11)

## 2020-08-26 ENCOUNTER — HISTORICAL (OUTPATIENT)
Dept: RADIOLOGY | Facility: HOSPITAL | Age: 40
End: 2020-08-26

## 2020-08-31 ENCOUNTER — HISTORICAL (OUTPATIENT)
Dept: ADMINISTRATIVE | Facility: HOSPITAL | Age: 40
End: 2020-08-31

## 2020-08-31 LAB
ABS NEUT (OLG): 1.55 X10(3)/MCL (ref 2.1–9.2)
ALBUMIN SERPL-MCNC: 3.2 GM/DL (ref 3.4–5)
ALBUMIN/GLOB SERPL: 0.9 RATIO (ref 1.1–2)
ALP SERPL-CCNC: 82 UNIT/L (ref 45–117)
ALT SERPL-CCNC: 11 UNIT/L (ref 12–78)
AST SERPL-CCNC: 23 UNIT/L (ref 15–37)
BASOPHILS # BLD AUTO: 0 X10(3)/MCL (ref 0–0.2)
BASOPHILS NFR BLD AUTO: 1 %
BILIRUB SERPL-MCNC: 0.8 MG/DL (ref 0.2–1)
BILIRUBIN DIRECT+TOT PNL SERPL-MCNC: 0.2 MG/DL (ref 0–0.2)
BILIRUBIN DIRECT+TOT PNL SERPL-MCNC: 0.6 MG/DL
BUN SERPL-MCNC: 2 MG/DL (ref 7–18)
CALCIUM SERPL-MCNC: 9 MG/DL (ref 8.5–10.1)
CEA SERPL-MCNC: 66.6 NG/ML
CHLORIDE SERPL-SCNC: 104 MMOL/L (ref 98–107)
CO2 SERPL-SCNC: 28 MMOL/L (ref 21–32)
CREAT SERPL-MCNC: 1.7 MG/DL (ref 0.6–1.3)
EOSINOPHIL # BLD AUTO: 0 X10(3)/MCL (ref 0–0.9)
EOSINOPHIL NFR BLD AUTO: 1 %
ERYTHROCYTE [DISTWIDTH] IN BLOOD BY AUTOMATED COUNT: 17.2 % (ref 11.5–14.5)
GLOBULIN SER-MCNC: 3.7 GM/ML (ref 2.3–3.5)
GLUCOSE SERPL-MCNC: 96 MG/DL (ref 74–106)
HCT VFR BLD AUTO: 34.5 % (ref 40–51)
HGB BLD-MCNC: 10.6 GM/DL (ref 13.5–17.5)
IMM GRANULOCYTES # BLD AUTO: 0.01 10*3/UL
IMM GRANULOCYTES NFR BLD AUTO: 0 %
LYMPHOCYTES # BLD AUTO: 1.4 X10(3)/MCL (ref 0.6–4.6)
LYMPHOCYTES NFR BLD AUTO: 41 %
MAGNESIUM SERPL-MCNC: 1.8 MG/DL (ref 1.6–2.6)
MCH RBC QN AUTO: 27.9 PG (ref 26–34)
MCHC RBC AUTO-ENTMCNC: 30.7 GM/DL (ref 31–37)
MCV RBC AUTO: 90.8 FL (ref 80–100)
MONOCYTES # BLD AUTO: 0.3 X10(3)/MCL (ref 0.1–1.3)
MONOCYTES NFR BLD AUTO: 10 %
NEUTROPHILS # BLD AUTO: 1.55 X10(3)/MCL (ref 2.1–9.2)
NEUTROPHILS NFR BLD AUTO: 46 %
PLATELET # BLD AUTO: 186 X10(3)/MCL (ref 130–400)
PMV BLD AUTO: 9.3 FL (ref 7.4–10.4)
POTASSIUM SERPL-SCNC: 3.4 MMOL/L (ref 3.5–5.1)
PROT SERPL-MCNC: 6.9 GM/DL (ref 6.4–8.2)
PROT UR STRIP-MCNC: 49.7 MG/DL
RBC # BLD AUTO: 3.8 X10(6)/MCL (ref 4.5–5.9)
SODIUM SERPL-SCNC: 141 MMOL/L (ref 136–145)
WBC # SPEC AUTO: 3.4 X10(3)/MCL (ref 4.5–11)

## 2020-09-17 ENCOUNTER — HISTORICAL (OUTPATIENT)
Dept: ADMINISTRATIVE | Facility: HOSPITAL | Age: 40
End: 2020-09-17

## 2020-09-17 LAB
ABS NEUT (OLG): 2.81 X10(3)/MCL (ref 2.1–9.2)
ALBUMIN SERPL-MCNC: 3.5 GM/DL (ref 3.4–5)
ALBUMIN/GLOB SERPL: 0.9 RATIO (ref 1.1–2)
ALP SERPL-CCNC: 80 UNIT/L (ref 45–117)
ALT SERPL-CCNC: 14 UNIT/L (ref 12–78)
AST SERPL-CCNC: 21 UNIT/L (ref 15–37)
BASOPHILS # BLD AUTO: 0 X10(3)/MCL (ref 0–0.2)
BASOPHILS NFR BLD AUTO: 1 %
BILIRUB SERPL-MCNC: 0.8 MG/DL (ref 0.2–1)
BILIRUBIN DIRECT+TOT PNL SERPL-MCNC: 0.2 MG/DL (ref 0–0.2)
BILIRUBIN DIRECT+TOT PNL SERPL-MCNC: 0.6 MG/DL
BUN SERPL-MCNC: 5 MG/DL (ref 7–18)
CALCIUM SERPL-MCNC: 9.4 MG/DL (ref 8.5–10.1)
CHLORIDE SERPL-SCNC: 105 MMOL/L (ref 98–107)
CO2 SERPL-SCNC: 31 MMOL/L (ref 21–32)
CREAT SERPL-MCNC: 1.8 MG/DL (ref 0.6–1.3)
EOSINOPHIL # BLD AUTO: 0.1 X10(3)/MCL (ref 0–0.9)
EOSINOPHIL NFR BLD AUTO: 2 %
ERYTHROCYTE [DISTWIDTH] IN BLOOD BY AUTOMATED COUNT: 15.9 % (ref 11.5–14.5)
GLOBULIN SER-MCNC: 3.7 GM/ML (ref 2.3–3.5)
GLUCOSE SERPL-MCNC: 103 MG/DL (ref 74–106)
HCT VFR BLD AUTO: 37.2 % (ref 40–51)
HGB BLD-MCNC: 11.4 GM/DL (ref 13.5–17.5)
LYMPHOCYTES # BLD AUTO: 1.2 X10(3)/MCL (ref 0.6–4.6)
LYMPHOCYTES NFR BLD AUTO: 28 %
MAGNESIUM SERPL-MCNC: 2 MG/DL (ref 1.6–2.6)
MCH RBC QN AUTO: 27.5 PG (ref 26–34)
MCHC RBC AUTO-ENTMCNC: 30.6 GM/DL (ref 31–37)
MCV RBC AUTO: 89.9 FL (ref 80–100)
MONOCYTES # BLD AUTO: 0.4 X10(3)/MCL (ref 0.1–1.3)
MONOCYTES NFR BLD AUTO: 8 %
NEUTROPHILS # BLD AUTO: 2.81 X10(3)/MCL (ref 2.1–9.2)
NEUTROPHILS NFR BLD AUTO: 62 %
PLATELET # BLD AUTO: 144 X10(3)/MCL (ref 130–400)
PMV BLD AUTO: 9.7 FL (ref 7.4–10.4)
POTASSIUM SERPL-SCNC: 3.7 MMOL/L (ref 3.5–5.1)
PROT SERPL-MCNC: 7.2 GM/DL (ref 6.4–8.2)
PROT UR STRIP-MCNC: 167.5 MG/DL
RBC # BLD AUTO: 4.14 X10(6)/MCL (ref 4.5–5.9)
SODIUM SERPL-SCNC: 142 MMOL/L (ref 136–145)
T4 FREE SERPL-MCNC: 0.97 NG/DL (ref 0.76–1.46)
TSH SERPL-ACNC: 8.15 MIU/L (ref 0.36–3.74)
WBC # SPEC AUTO: 4.5 X10(3)/MCL (ref 4.5–11)

## 2020-09-24 ENCOUNTER — HISTORICAL (OUTPATIENT)
Dept: ADMINISTRATIVE | Facility: HOSPITAL | Age: 40
End: 2020-09-24

## 2020-09-24 LAB
ABS NEUT (OLG): 1.62 X10(3)/MCL (ref 2.1–9.2)
ALBUMIN SERPL-MCNC: 3.8 GM/DL (ref 3.4–5)
ALBUMIN/GLOB SERPL: 0.9 RATIO (ref 1.1–2)
ALP SERPL-CCNC: 82 UNIT/L (ref 45–117)
ALT SERPL-CCNC: 13 UNIT/L (ref 12–78)
AST SERPL-CCNC: 17 UNIT/L (ref 15–37)
BASOPHILS # BLD AUTO: 0 X10(3)/MCL (ref 0–0.2)
BASOPHILS NFR BLD AUTO: 1 %
BILIRUB SERPL-MCNC: 0.8 MG/DL (ref 0.2–1)
BILIRUBIN DIRECT+TOT PNL SERPL-MCNC: 0.2 MG/DL (ref 0–0.2)
BILIRUBIN DIRECT+TOT PNL SERPL-MCNC: 0.6 MG/DL
BUN SERPL-MCNC: 6 MG/DL (ref 7–18)
CALCIUM SERPL-MCNC: 9.8 MG/DL (ref 8.5–10.1)
CEA SERPL-MCNC: 75.9 NG/ML
CHLORIDE SERPL-SCNC: 99 MMOL/L (ref 98–107)
CO2 SERPL-SCNC: 33 MMOL/L (ref 21–32)
CREAT SERPL-MCNC: 2.1 MG/DL (ref 0.6–1.3)
EOSINOPHIL # BLD AUTO: 0.1 X10(3)/MCL (ref 0–0.9)
EOSINOPHIL NFR BLD AUTO: 2 %
ERYTHROCYTE [DISTWIDTH] IN BLOOD BY AUTOMATED COUNT: 15.4 % (ref 11.5–14.5)
GLOBULIN SER-MCNC: 4.3 GM/ML (ref 2.3–3.5)
GLUCOSE SERPL-MCNC: 99 MG/DL (ref 74–106)
HCT VFR BLD AUTO: 40.8 % (ref 40–51)
HGB BLD-MCNC: 12.6 GM/DL (ref 13.5–17.5)
IMM GRANULOCYTES # BLD AUTO: 0.01 10*3/UL
IMM GRANULOCYTES NFR BLD AUTO: 0 %
LYMPHOCYTES # BLD AUTO: 1.8 X10(3)/MCL (ref 0.6–4.6)
LYMPHOCYTES NFR BLD AUTO: 45 %
MAGNESIUM SERPL-MCNC: 2.3 MG/DL (ref 1.6–2.6)
MCH RBC QN AUTO: 27.2 PG (ref 26–34)
MCHC RBC AUTO-ENTMCNC: 30.9 GM/DL (ref 31–37)
MCV RBC AUTO: 88.1 FL (ref 80–100)
MONOCYTES # BLD AUTO: 0.4 X10(3)/MCL (ref 0.1–1.3)
MONOCYTES NFR BLD AUTO: 11 %
NEUTROPHILS # BLD AUTO: 1.62 X10(3)/MCL (ref 2.1–9.2)
NEUTROPHILS NFR BLD AUTO: 41 %
PLATELET # BLD AUTO: 183 X10(3)/MCL (ref 130–400)
PMV BLD AUTO: 9 FL (ref 7.4–10.4)
POTASSIUM SERPL-SCNC: 3.7 MMOL/L (ref 3.5–5.1)
PROT SERPL-MCNC: 8.1 GM/DL (ref 6.4–8.2)
PROT UR STRIP-MCNC: 136 MG/DL
RBC # BLD AUTO: 4.63 X10(6)/MCL (ref 4.5–5.9)
SODIUM SERPL-SCNC: 140 MMOL/L (ref 136–145)
WBC # SPEC AUTO: 4 X10(3)/MCL (ref 4.5–11)

## 2020-10-08 ENCOUNTER — HISTORICAL (OUTPATIENT)
Dept: INFUSION THERAPY | Facility: HOSPITAL | Age: 40
End: 2020-10-08

## 2020-10-08 LAB
ABS NEUT (OLG): 0.93 X10(3)/MCL (ref 2.1–9.2)
ALBUMIN SERPL-MCNC: 3.6 GM/DL (ref 3.4–5)
ALBUMIN/GLOB SERPL: 0.8 RATIO (ref 1.1–2)
ALP SERPL-CCNC: 101 UNIT/L (ref 45–117)
ALT SERPL-CCNC: 19 UNIT/L (ref 12–78)
AST SERPL-CCNC: 30 UNIT/L (ref 15–37)
BASOPHILS # BLD AUTO: 0 X10(3)/MCL (ref 0–0.2)
BASOPHILS NFR BLD AUTO: 1 %
BILIRUB SERPL-MCNC: 1.3 MG/DL (ref 0.2–1)
BILIRUBIN DIRECT+TOT PNL SERPL-MCNC: 0.3 MG/DL (ref 0–0.2)
BILIRUBIN DIRECT+TOT PNL SERPL-MCNC: 1 MG/DL
BUN SERPL-MCNC: 14 MG/DL (ref 7–18)
CALCIUM SERPL-MCNC: 9 MG/DL (ref 8.5–10.1)
CEA SERPL-MCNC: 113.8 NG/ML
CHLORIDE SERPL-SCNC: 102 MMOL/L (ref 98–107)
CO2 SERPL-SCNC: 28 MMOL/L (ref 21–32)
CREAT SERPL-MCNC: 2.2 MG/DL (ref 0.6–1.3)
EOSINOPHIL # BLD AUTO: 0 X10(3)/MCL (ref 0–0.9)
EOSINOPHIL NFR BLD AUTO: 0 %
ERYTHROCYTE [DISTWIDTH] IN BLOOD BY AUTOMATED COUNT: 14 % (ref 11.5–14.5)
GLOBULIN SER-MCNC: 4.3 GM/ML (ref 2.3–3.5)
GLUCOSE SERPL-MCNC: 109 MG/DL (ref 74–106)
HCT VFR BLD AUTO: 42 % (ref 40–51)
HGB BLD-MCNC: 13.5 GM/DL (ref 13.5–17.5)
IMM GRANULOCYTES # BLD AUTO: 0.01 10*3/UL
IMM GRANULOCYTES NFR BLD AUTO: 0 %
LYMPHOCYTES # BLD AUTO: 1.2 X10(3)/MCL (ref 0.6–4.6)
LYMPHOCYTES NFR BLD AUTO: 48 %
MAGNESIUM SERPL-MCNC: 2.2 MG/DL (ref 1.6–2.6)
MCH RBC QN AUTO: 27 PG (ref 26–34)
MCHC RBC AUTO-ENTMCNC: 32.1 GM/DL (ref 31–37)
MCV RBC AUTO: 84 FL (ref 80–100)
MONOCYTES # BLD AUTO: 0.4 X10(3)/MCL (ref 0.1–1.3)
MONOCYTES NFR BLD AUTO: 14 %
NEUTROPHILS # BLD AUTO: 0.93 X10(3)/MCL (ref 2.1–9.2)
NEUTROPHILS NFR BLD AUTO: 36 %
PLATELET # BLD AUTO: 134 X10(3)/MCL (ref 130–400)
PMV BLD AUTO: 9.4 FL (ref 7.4–10.4)
POTASSIUM SERPL-SCNC: 4 MMOL/L (ref 3.5–5.1)
PROT SERPL-MCNC: 7.9 GM/DL (ref 6.4–8.2)
RBC # BLD AUTO: 5 X10(6)/MCL (ref 4.5–5.9)
SODIUM SERPL-SCNC: 135 MMOL/L (ref 136–145)
WBC # SPEC AUTO: 2.6 X10(3)/MCL (ref 4.5–11)

## 2020-10-20 ENCOUNTER — HISTORICAL (OUTPATIENT)
Dept: HEMATOLOGY/ONCOLOGY | Facility: CLINIC | Age: 40
End: 2020-10-20

## 2020-10-20 LAB
ALBUMIN SERPL-MCNC: 3.9 GM/DL (ref 3.5–5)
ALBUMIN/GLOB SERPL: 1 RATIO (ref 1.1–2)
ALP SERPL-CCNC: 95 UNIT/L (ref 40–150)
ALT SERPL-CCNC: 12 UNIT/L (ref 0–55)
ANISOCYTOSIS BLD QL SMEAR: ABNORMAL
AST SERPL-CCNC: 31 UNIT/L (ref 5–34)
BASOPHILS NFR BLD MANUAL: 0 %
BILIRUB SERPL-MCNC: 1.5 MG/DL
BILIRUBIN DIRECT+TOT PNL SERPL-MCNC: 0.5 MG/DL (ref 0–0.5)
BILIRUBIN DIRECT+TOT PNL SERPL-MCNC: 1 MG/DL (ref 0–0.8)
BUN SERPL-MCNC: 15 MG/DL (ref 8.9–20.6)
CALCIUM SERPL-MCNC: 9.7 MG/DL (ref 8.4–10.2)
CEA SERPL-MCNC: 154.74 NG/ML (ref 0–3)
CHLORIDE SERPL-SCNC: 102 MMOL/L (ref 98–107)
CO2 SERPL-SCNC: 28 MMOL/L (ref 22–29)
CREAT SERPL-MCNC: 1.95 MG/DL (ref 0.73–1.18)
EOSINOPHIL NFR BLD MANUAL: 1 %
ERYTHROCYTE [DISTWIDTH] IN BLOOD BY AUTOMATED COUNT: 14 % (ref 11.5–14.5)
GLOBULIN SER-MCNC: 3.8 GM/DL (ref 2.4–3.5)
GLUCOSE SERPL-MCNC: 88 MG/DL (ref 74–100)
GRANULOCYTES NFR BLD MANUAL: 36 % (ref 43–75)
HCT VFR BLD AUTO: 40.9 % (ref 40–51)
HGB BLD-MCNC: 12.8 GM/DL (ref 13.5–17.5)
LYMPHOCYTES NFR BLD MANUAL: 1 %
LYMPHOCYTES NFR BLD MANUAL: 51 % (ref 20.5–51.1)
MAGNESIUM SERPL-MCNC: 1.86 MG/DL (ref 1.6–2.6)
MCH RBC QN AUTO: 25.7 PG (ref 26–34)
MCHC RBC AUTO-ENTMCNC: 31.3 GM/DL (ref 31–37)
MCV RBC AUTO: 82.1 FL (ref 80–100)
MONOCYTES NFR BLD MANUAL: 11 % (ref 2–9)
OVALOCYTES BLD QL SMEAR: ABNORMAL
PLATELET # BLD AUTO: 110 X10(3)/MCL (ref 130–400)
PLATELET # BLD EST: ABNORMAL 10*3/UL
PMV BLD AUTO: 9.7 FL (ref 7.4–10.4)
POIKILOCYTOSIS BLD QL SMEAR: ABNORMAL
POTASSIUM SERPL-SCNC: 4.1 MMOL/L (ref 3.5–5.1)
PROT SERPL-MCNC: 7.7 GM/DL (ref 6.4–8.3)
RBC # BLD AUTO: 4.98 X10(6)/MCL (ref 4.5–5.9)
RBC MORPH BLD: ABNORMAL
SODIUM SERPL-SCNC: 139 MMOL/L (ref 136–145)
WBC # SPEC AUTO: 2.5 X10(3)/MCL (ref 4.5–11)

## 2020-10-26 ENCOUNTER — HISTORICAL (OUTPATIENT)
Dept: HEMATOLOGY/ONCOLOGY | Facility: CLINIC | Age: 40
End: 2020-10-26

## 2020-10-26 LAB
ABS NEUT (OLG): 1.2 X10(3)/MCL (ref 2.1–9.2)
ALBUMIN SERPL-MCNC: 3.8 GM/DL (ref 3.5–5)
ALBUMIN/GLOB SERPL: 1 RATIO (ref 1.1–2)
ALP SERPL-CCNC: 88 UNIT/L (ref 40–150)
ALT SERPL-CCNC: 6 UNIT/L (ref 0–55)
AST SERPL-CCNC: 21 UNIT/L (ref 5–34)
BASOPHILS # BLD AUTO: 0 X10(3)/MCL (ref 0–0.2)
BASOPHILS NFR BLD AUTO: 1 %
BILIRUB SERPL-MCNC: 1.3 MG/DL
BILIRUBIN DIRECT+TOT PNL SERPL-MCNC: 0.5 MG/DL (ref 0–0.5)
BILIRUBIN DIRECT+TOT PNL SERPL-MCNC: 0.8 MG/DL (ref 0–0.8)
BUN SERPL-MCNC: 16 MG/DL (ref 8.9–20.6)
CALCIUM SERPL-MCNC: 9.3 MG/DL (ref 8.4–10.2)
CHLORIDE SERPL-SCNC: 100 MMOL/L (ref 98–107)
CO2 SERPL-SCNC: 29 MMOL/L (ref 22–29)
CREAT SERPL-MCNC: 2.25 MG/DL (ref 0.73–1.18)
EOSINOPHIL # BLD AUTO: 0 X10(3)/MCL (ref 0–0.9)
EOSINOPHIL NFR BLD AUTO: 1 %
ERYTHROCYTE [DISTWIDTH] IN BLOOD BY AUTOMATED COUNT: 14.3 % (ref 11.5–14.5)
GLOBULIN SER-MCNC: 3.8 GM/DL (ref 2.4–3.5)
GLUCOSE SERPL-MCNC: 93 MG/DL (ref 74–100)
HCT VFR BLD AUTO: 38.5 % (ref 40–51)
HGB BLD-MCNC: 12.3 GM/DL (ref 13.5–17.5)
LYMPHOCYTES # BLD AUTO: 1.3 X10(3)/MCL (ref 0.6–4.6)
LYMPHOCYTES NFR BLD AUTO: 47 %
MAGNESIUM SERPL-MCNC: 1.93 MG/DL (ref 1.6–2.6)
MCH RBC QN AUTO: 26.3 PG (ref 26–34)
MCHC RBC AUTO-ENTMCNC: 31.9 GM/DL (ref 31–37)
MCV RBC AUTO: 82.3 FL (ref 80–100)
MONOCYTES # BLD AUTO: 0.2 X10(3)/MCL (ref 0.1–1.3)
MONOCYTES NFR BLD AUTO: 9 %
NEUTROPHILS # BLD AUTO: 1.2 X10(3)/MCL (ref 2.1–9.2)
NEUTROPHILS NFR BLD AUTO: 42 %
PLATELET # BLD AUTO: 134 X10(3)/MCL (ref 130–400)
PMV BLD AUTO: 9.1 FL (ref 7.4–10.4)
POTASSIUM SERPL-SCNC: 3.5 MMOL/L (ref 3.5–5.1)
PROT SERPL-MCNC: 7.6 GM/DL (ref 6.4–8.3)
RBC # BLD AUTO: 4.68 X10(6)/MCL (ref 4.5–5.9)
SODIUM SERPL-SCNC: 136 MMOL/L (ref 136–145)
WBC # SPEC AUTO: 2.8 X10(3)/MCL (ref 4.5–11)

## 2020-11-04 ENCOUNTER — HISTORICAL (OUTPATIENT)
Dept: HEMATOLOGY/ONCOLOGY | Facility: CLINIC | Age: 40
End: 2020-11-04

## 2020-11-04 LAB
ABS NEUT (OLG): 1.84 X10(3)/MCL (ref 2.1–9.2)
ALBUMIN SERPL-MCNC: 3.7 GM/DL (ref 3.5–5)
ALBUMIN/GLOB SERPL: 1 RATIO (ref 1.1–2)
ALP SERPL-CCNC: 77 UNIT/L (ref 40–150)
ALT SERPL-CCNC: 6 UNIT/L (ref 0–55)
AST SERPL-CCNC: 17 UNIT/L (ref 5–34)
BASOPHILS # BLD AUTO: 0 X10(3)/MCL (ref 0–0.2)
BASOPHILS NFR BLD AUTO: 0 %
BILIRUB SERPL-MCNC: 0.9 MG/DL
BILIRUBIN DIRECT+TOT PNL SERPL-MCNC: 0.3 MG/DL (ref 0–0.5)
BILIRUBIN DIRECT+TOT PNL SERPL-MCNC: 0.6 MG/DL (ref 0–0.8)
BUN SERPL-MCNC: 8 MG/DL (ref 8.9–20.6)
CALCIUM SERPL-MCNC: 9.5 MG/DL (ref 8.4–10.2)
CHLORIDE SERPL-SCNC: 104 MMOL/L (ref 98–107)
CO2 SERPL-SCNC: 27 MMOL/L (ref 22–29)
CREAT SERPL-MCNC: 2.03 MG/DL (ref 0.73–1.18)
EOSINOPHIL # BLD AUTO: 0 X10(3)/MCL (ref 0–0.9)
EOSINOPHIL NFR BLD AUTO: 1 %
ERYTHROCYTE [DISTWIDTH] IN BLOOD BY AUTOMATED COUNT: 15.2 % (ref 11.5–14.5)
GLOBULIN SER-MCNC: 3.7 GM/DL (ref 2.4–3.5)
GLUCOSE SERPL-MCNC: 93 MG/DL (ref 74–100)
HCT VFR BLD AUTO: 35.2 % (ref 40–51)
HGB BLD-MCNC: 11 GM/DL (ref 13.5–17.5)
IMM GRANULOCYTES # BLD AUTO: 0.01 10*3/UL
IMM GRANULOCYTES NFR BLD AUTO: 0 %
LYMPHOCYTES # BLD AUTO: 1.5 X10(3)/MCL (ref 0.6–4.6)
LYMPHOCYTES NFR BLD AUTO: 40 %
MCH RBC QN AUTO: 25.9 PG (ref 26–34)
MCHC RBC AUTO-ENTMCNC: 31.3 GM/DL (ref 31–37)
MCV RBC AUTO: 83 FL (ref 80–100)
MONOCYTES # BLD AUTO: 0.4 X10(3)/MCL (ref 0.1–1.3)
MONOCYTES NFR BLD AUTO: 9 %
NEUTROPHILS # BLD AUTO: 1.84 X10(3)/MCL (ref 2.1–9.2)
NEUTROPHILS NFR BLD AUTO: 49 %
PLATELET # BLD AUTO: 150 X10(3)/MCL (ref 130–400)
PMV BLD AUTO: 9.2 FL (ref 7.4–10.4)
POTASSIUM SERPL-SCNC: 3.8 MMOL/L (ref 3.5–5.1)
PROT SERPL-MCNC: 7.4 GM/DL (ref 6.4–8.3)
RBC # BLD AUTO: 4.24 X10(6)/MCL (ref 4.5–5.9)
SODIUM SERPL-SCNC: 141 MMOL/L (ref 136–145)
WBC # SPEC AUTO: 3.8 X10(3)/MCL (ref 4.5–11)

## 2020-11-18 ENCOUNTER — HISTORICAL (OUTPATIENT)
Dept: ADMINISTRATIVE | Facility: HOSPITAL | Age: 40
End: 2020-11-18

## 2020-11-18 LAB
ABS NEUT (OLG): 2.7 X10(3)/MCL (ref 2.1–9.2)
ALBUMIN SERPL-MCNC: 3.4 GM/DL (ref 3.5–5)
ALBUMIN/GLOB SERPL: 1 RATIO (ref 1.1–2)
ALP SERPL-CCNC: 74 UNIT/L (ref 40–150)
ALT SERPL-CCNC: 13 UNIT/L (ref 0–55)
AST SERPL-CCNC: 29 UNIT/L (ref 5–34)
BASOPHILS # BLD AUTO: 0 X10(3)/MCL (ref 0–0.2)
BASOPHILS NFR BLD AUTO: 1 %
BILIRUB SERPL-MCNC: 1.1 MG/DL
BILIRUBIN DIRECT+TOT PNL SERPL-MCNC: 0.4 MG/DL (ref 0–0.5)
BILIRUBIN DIRECT+TOT PNL SERPL-MCNC: 0.7 MG/DL (ref 0–0.8)
BUN SERPL-MCNC: 7 MG/DL (ref 8.9–20.6)
CALCIUM SERPL-MCNC: 8.6 MG/DL (ref 8.4–10.2)
CEA SERPL-MCNC: 242.46 NG/ML (ref 0–3)
CHLORIDE SERPL-SCNC: 105 MMOL/L (ref 98–107)
CO2 SERPL-SCNC: 26 MMOL/L (ref 22–29)
CREAT SERPL-MCNC: 1.68 MG/DL (ref 0.73–1.18)
EOSINOPHIL # BLD AUTO: 0 X10(3)/MCL (ref 0–0.9)
EOSINOPHIL NFR BLD AUTO: 1 %
ERYTHROCYTE [DISTWIDTH] IN BLOOD BY AUTOMATED COUNT: 15.8 % (ref 11.5–14.5)
GLOBULIN SER-MCNC: 3.4 GM/DL (ref 2.4–3.5)
GLUCOSE SERPL-MCNC: 87 MG/DL (ref 74–100)
HCT VFR BLD AUTO: 36.7 % (ref 40–51)
HGB BLD-MCNC: 11.5 GM/DL (ref 13.5–17.5)
IMM GRANULOCYTES # BLD AUTO: 0.01 10*3/UL
IMM GRANULOCYTES NFR BLD AUTO: 0 %
LYMPHOCYTES # BLD AUTO: 1.6 X10(3)/MCL (ref 0.6–4.6)
LYMPHOCYTES NFR BLD AUTO: 33 %
MCH RBC QN AUTO: 26 PG (ref 26–34)
MCHC RBC AUTO-ENTMCNC: 31.3 GM/DL (ref 31–37)
MCV RBC AUTO: 83 FL (ref 80–100)
MONOCYTES # BLD AUTO: 0.4 X10(3)/MCL (ref 0.1–1.3)
MONOCYTES NFR BLD AUTO: 8 %
NEUTROPHILS # BLD AUTO: 2.7 X10(3)/MCL (ref 2.1–9.2)
NEUTROPHILS NFR BLD AUTO: 57 %
PLATELET # BLD AUTO: 168 X10(3)/MCL (ref 130–400)
PMV BLD AUTO: 9.6 FL (ref 7.4–10.4)
POTASSIUM SERPL-SCNC: 3.8 MMOL/L (ref 3.5–5.1)
PROT SERPL-MCNC: 6.8 GM/DL (ref 6.4–8.3)
RBC # BLD AUTO: 4.42 X10(6)/MCL (ref 4.5–5.9)
SODIUM SERPL-SCNC: 139 MMOL/L (ref 136–145)
T4 FREE SERPL-MCNC: 0.79 NG/DL (ref 0.7–1.48)
TSH SERPL-ACNC: 19.65 UIU/ML (ref 0.35–4.94)
WBC # SPEC AUTO: 4.8 X10(3)/MCL (ref 4.5–11)

## 2020-11-30 ENCOUNTER — HISTORICAL (OUTPATIENT)
Dept: RADIOLOGY | Facility: HOSPITAL | Age: 40
End: 2020-11-30

## 2020-12-03 ENCOUNTER — HISTORICAL (OUTPATIENT)
Dept: INFUSION THERAPY | Facility: HOSPITAL | Age: 40
End: 2020-12-03

## 2020-12-03 LAB
ABS NEUT (OLG): 2.06 X10(3)/MCL (ref 2.1–9.2)
ALBUMIN SERPL-MCNC: 3.4 GM/DL (ref 3.5–5)
ALBUMIN/GLOB SERPL: 1 RATIO (ref 1.1–2)
ALP SERPL-CCNC: 70 UNIT/L (ref 40–150)
ALT SERPL-CCNC: 6 UNIT/L (ref 0–55)
AST SERPL-CCNC: 14 UNIT/L (ref 5–34)
BASOPHILS # BLD AUTO: 0 X10(3)/MCL (ref 0–0.2)
BASOPHILS NFR BLD AUTO: 1 %
BILIRUB SERPL-MCNC: 1 MG/DL
BILIRUBIN DIRECT+TOT PNL SERPL-MCNC: 0.4 MG/DL (ref 0–0.5)
BILIRUBIN DIRECT+TOT PNL SERPL-MCNC: 0.6 MG/DL (ref 0–0.8)
BUN SERPL-MCNC: 5 MG/DL (ref 8.9–20.6)
CALCIUM SERPL-MCNC: 8.8 MG/DL (ref 8.4–10.2)
CEA SERPL-MCNC: 328.29 NG/ML (ref 0–3)
CHLORIDE SERPL-SCNC: 103 MMOL/L (ref 98–107)
CO2 SERPL-SCNC: 28 MMOL/L (ref 22–29)
CREAT SERPL-MCNC: 1.6 MG/DL (ref 0.73–1.18)
EOSINOPHIL # BLD AUTO: 0 X10(3)/MCL (ref 0–0.9)
EOSINOPHIL NFR BLD AUTO: 1 %
ERYTHROCYTE [DISTWIDTH] IN BLOOD BY AUTOMATED COUNT: 16.8 % (ref 11.5–14.5)
GLOBULIN SER-MCNC: 3.3 GM/DL (ref 2.4–3.5)
GLUCOSE SERPL-MCNC: 117 MG/DL (ref 74–100)
HCT VFR BLD AUTO: 36.7 % (ref 40–51)
HGB BLD-MCNC: 11 GM/DL (ref 13.5–17.5)
IMM GRANULOCYTES # BLD AUTO: 0.01 10*3/UL
IMM GRANULOCYTES NFR BLD AUTO: 0 %
LYMPHOCYTES # BLD AUTO: 1.1 X10(3)/MCL (ref 0.6–4.6)
LYMPHOCYTES NFR BLD AUTO: 30 %
MAGNESIUM SERPL-MCNC: 1.62 MG/DL (ref 1.6–2.6)
MCH RBC QN AUTO: 25.2 PG (ref 26–34)
MCHC RBC AUTO-ENTMCNC: 30 GM/DL (ref 31–37)
MCV RBC AUTO: 84 FL (ref 80–100)
MONOCYTES # BLD AUTO: 0.4 X10(3)/MCL (ref 0.1–1.3)
MONOCYTES NFR BLD AUTO: 11 %
NEUTROPHILS # BLD AUTO: 2.06 X10(3)/MCL (ref 2.1–9.2)
NEUTROPHILS NFR BLD AUTO: 58 %
PLATELET # BLD AUTO: 127 X10(3)/MCL (ref 130–400)
PMV BLD AUTO: 9.4 FL (ref 7.4–10.4)
POTASSIUM SERPL-SCNC: 3.5 MMOL/L (ref 3.5–5.1)
PROT SERPL-MCNC: 6.7 GM/DL (ref 6.4–8.3)
RBC # BLD AUTO: 4.37 X10(6)/MCL (ref 4.5–5.9)
SODIUM SERPL-SCNC: 138 MMOL/L (ref 136–145)
WBC # SPEC AUTO: 3.6 X10(3)/MCL (ref 4.5–11)

## 2020-12-16 ENCOUNTER — HISTORICAL (OUTPATIENT)
Dept: ADMINISTRATIVE | Facility: HOSPITAL | Age: 40
End: 2020-12-16

## 2020-12-16 LAB
ABS NEUT (OLG): 2.32 X10(3)/MCL (ref 2.1–9.2)
ALBUMIN SERPL-MCNC: 3.7 GM/DL (ref 3.5–5)
ALBUMIN/GLOB SERPL: 0.9 RATIO (ref 1.1–2)
ALP SERPL-CCNC: 88 UNIT/L (ref 40–150)
ALT SERPL-CCNC: <5 UNIT/L (ref 0–55)
AST SERPL-CCNC: 18 UNIT/L (ref 5–34)
BASOPHILS # BLD AUTO: 0 X10(3)/MCL (ref 0–0.2)
BASOPHILS NFR BLD AUTO: 1 %
BILIRUB SERPL-MCNC: 1.1 MG/DL
BILIRUBIN DIRECT+TOT PNL SERPL-MCNC: 0.5 MG/DL (ref 0–0.5)
BILIRUBIN DIRECT+TOT PNL SERPL-MCNC: 0.6 MG/DL (ref 0–0.8)
BUN SERPL-MCNC: 7 MG/DL (ref 8.9–20.6)
CALCIUM SERPL-MCNC: 9.7 MG/DL (ref 8.4–10.2)
CEA SERPL-MCNC: 346.44 NG/ML (ref 0–3)
CHLORIDE SERPL-SCNC: 101 MMOL/L (ref 98–107)
CO2 SERPL-SCNC: 28 MMOL/L (ref 22–29)
CREAT SERPL-MCNC: 1.73 MG/DL (ref 0.73–1.18)
EOSINOPHIL # BLD AUTO: 0 X10(3)/MCL (ref 0–0.9)
EOSINOPHIL NFR BLD AUTO: 1 %
ERYTHROCYTE [DISTWIDTH] IN BLOOD BY AUTOMATED COUNT: 16.8 % (ref 11.5–14.5)
GLOBULIN SER-MCNC: 3.9 GM/DL (ref 2.4–3.5)
GLUCOSE SERPL-MCNC: 84 MG/DL (ref 74–100)
HCT VFR BLD AUTO: 39.2 % (ref 40–51)
HGB BLD-MCNC: 11.9 GM/DL (ref 13.5–17.5)
IMM GRANULOCYTES # BLD AUTO: 0.01 10*3/UL
IMM GRANULOCYTES NFR BLD AUTO: 0 %
LYMPHOCYTES # BLD AUTO: 1.5 X10(3)/MCL (ref 0.6–4.6)
LYMPHOCYTES NFR BLD AUTO: 34 %
MCH RBC QN AUTO: 25 PG (ref 26–34)
MCHC RBC AUTO-ENTMCNC: 30.4 GM/DL (ref 31–37)
MCV RBC AUTO: 82.4 FL (ref 80–100)
MONOCYTES # BLD AUTO: 0.4 X10(3)/MCL (ref 0.1–1.3)
MONOCYTES NFR BLD AUTO: 10 %
NEUTROPHILS # BLD AUTO: 2.32 X10(3)/MCL (ref 2.1–9.2)
NEUTROPHILS NFR BLD AUTO: 54 %
PLATELET # BLD AUTO: 197 X10(3)/MCL (ref 130–400)
PMV BLD AUTO: 9.1 FL (ref 7.4–10.4)
POTASSIUM SERPL-SCNC: 4.2 MMOL/L (ref 3.5–5.1)
PROT SERPL-MCNC: 7.6 GM/DL (ref 6.4–8.3)
RBC # BLD AUTO: 4.76 X10(6)/MCL (ref 4.5–5.9)
SODIUM SERPL-SCNC: 141 MMOL/L (ref 136–145)
WBC # SPEC AUTO: 4.3 X10(3)/MCL (ref 4.5–11)

## 2020-12-30 ENCOUNTER — HISTORICAL (OUTPATIENT)
Dept: HEMATOLOGY/ONCOLOGY | Facility: CLINIC | Age: 40
End: 2020-12-30

## 2020-12-30 LAB
ABS NEUT (OLG): 1.79 X10(3)/MCL (ref 2.1–9.2)
ALBUMIN SERPL-MCNC: 3.8 GM/DL (ref 3.5–5)
ALBUMIN/GLOB SERPL: 1.1 RATIO (ref 1.1–2)
ALP SERPL-CCNC: 94 UNIT/L (ref 40–150)
ALT SERPL-CCNC: 14 UNIT/L (ref 0–55)
AST SERPL-CCNC: 19 UNIT/L (ref 5–34)
BASOPHILS # BLD AUTO: 0 X10(3)/MCL (ref 0–0.2)
BASOPHILS NFR BLD AUTO: 1 %
BILIRUB SERPL-MCNC: 1.1 MG/DL
BILIRUBIN DIRECT+TOT PNL SERPL-MCNC: 0.5 MG/DL (ref 0–0.5)
BILIRUBIN DIRECT+TOT PNL SERPL-MCNC: 0.6 MG/DL (ref 0–0.8)
BUN SERPL-MCNC: 8 MG/DL (ref 8.9–20.6)
CALCIUM SERPL-MCNC: 9.8 MG/DL (ref 8.4–10.2)
CHLORIDE SERPL-SCNC: 98 MMOL/L (ref 98–107)
CO2 SERPL-SCNC: 29 MMOL/L (ref 22–29)
CREAT SERPL-MCNC: 1.98 MG/DL (ref 0.73–1.18)
EOSINOPHIL # BLD AUTO: 0 X10(3)/MCL (ref 0–0.9)
EOSINOPHIL NFR BLD AUTO: 1 %
ERYTHROCYTE [DISTWIDTH] IN BLOOD BY AUTOMATED COUNT: 16.4 % (ref 11.5–14.5)
GLOBULIN SER-MCNC: 3.6 GM/DL (ref 2.4–3.5)
GLUCOSE SERPL-MCNC: 95 MG/DL (ref 74–100)
HCT VFR BLD AUTO: 37.5 % (ref 40–51)
HGB BLD-MCNC: 11.4 GM/DL (ref 13.5–17.5)
LYMPHOCYTES # BLD AUTO: 1.2 X10(3)/MCL (ref 0.6–4.6)
LYMPHOCYTES NFR BLD AUTO: 35 %
MCH RBC QN AUTO: 25.5 PG (ref 26–34)
MCHC RBC AUTO-ENTMCNC: 30.4 GM/DL (ref 31–37)
MCV RBC AUTO: 83.9 FL (ref 80–100)
MONOCYTES # BLD AUTO: 0.4 X10(3)/MCL (ref 0.1–1.3)
MONOCYTES NFR BLD AUTO: 12 %
NEUTROPHILS # BLD AUTO: 1.79 X10(3)/MCL (ref 2.1–9.2)
NEUTROPHILS NFR BLD AUTO: 52 %
PLATELET # BLD AUTO: 204 X10(3)/MCL (ref 130–400)
PMV BLD AUTO: 9.5 FL (ref 7.4–10.4)
POTASSIUM SERPL-SCNC: 4.3 MMOL/L (ref 3.5–5.1)
PROT SERPL-MCNC: 7.4 GM/DL (ref 6.4–8.3)
RBC # BLD AUTO: 4.47 X10(6)/MCL (ref 4.5–5.9)
SODIUM SERPL-SCNC: 139 MMOL/L (ref 136–145)
WBC # SPEC AUTO: 3.4 X10(3)/MCL (ref 4.5–11)

## 2021-01-20 ENCOUNTER — DOCUMENTATION ONLY (OUTPATIENT)
Dept: HEMATOLOGY/ONCOLOGY | Facility: CLINIC | Age: 41
End: 2021-01-20

## 2021-01-25 ENCOUNTER — OFFICE VISIT (OUTPATIENT)
Dept: HEMATOLOGY/ONCOLOGY | Facility: CLINIC | Age: 41
End: 2021-01-25
Payer: MEDICARE

## 2021-01-25 VITALS
HEIGHT: 72 IN | HEART RATE: 90 BPM | OXYGEN SATURATION: 99 % | BODY MASS INDEX: 24.85 KG/M2 | WEIGHT: 183.44 LBS | DIASTOLIC BLOOD PRESSURE: 92 MMHG | TEMPERATURE: 98 F | SYSTOLIC BLOOD PRESSURE: 129 MMHG

## 2021-01-25 DIAGNOSIS — D64.9 ANEMIA, UNSPECIFIED TYPE: Primary | ICD-10-CM

## 2021-01-25 DIAGNOSIS — C18.9 COLON ADENOCARCINOMA: ICD-10-CM

## 2021-01-25 PROCEDURE — 99999 PR PBB SHADOW E&M-EST. PATIENT-LVL III: CPT | Mod: PBBFAC,,, | Performed by: INTERNAL MEDICINE

## 2021-01-25 PROCEDURE — 99999 PR PBB SHADOW E&M-EST. PATIENT-LVL III: ICD-10-PCS | Mod: PBBFAC,,, | Performed by: INTERNAL MEDICINE

## 2021-01-25 PROCEDURE — 99205 OFFICE O/P NEW HI 60 MIN: CPT | Mod: S$PBB,,, | Performed by: INTERNAL MEDICINE

## 2021-01-25 PROCEDURE — 99213 OFFICE O/P EST LOW 20 MIN: CPT | Mod: PBBFAC | Performed by: INTERNAL MEDICINE

## 2021-01-25 PROCEDURE — 99205 PR OFFICE/OUTPT VISIT, NEW, LEVL V, 60-74 MIN: ICD-10-PCS | Mod: S$PBB,,, | Performed by: INTERNAL MEDICINE

## 2021-01-28 ENCOUNTER — HISTORICAL (OUTPATIENT)
Dept: INFUSION THERAPY | Facility: HOSPITAL | Age: 41
End: 2021-01-28

## 2021-02-22 ENCOUNTER — HISTORICAL (OUTPATIENT)
Dept: ADMINISTRATIVE | Facility: HOSPITAL | Age: 41
End: 2021-02-22

## 2021-02-22 LAB
ABS NEUT (OLG): 5.96 X10(3)/MCL (ref 2.1–9.2)
ALBUMIN SERPL-MCNC: 2.7 GM/DL (ref 3.5–5)
ALBUMIN/GLOB SERPL: 0.6 RATIO (ref 1.1–2)
ALP SERPL-CCNC: 156 UNIT/L (ref 40–150)
ALT SERPL-CCNC: 25 UNIT/L (ref 0–55)
ANISOCYTOSIS BLD QL SMEAR: ABNORMAL
AST SERPL-CCNC: 28 UNIT/L (ref 5–34)
BASOPHILS NFR BLD MANUAL: 1 %
BILIRUB SERPL-MCNC: 0.7 MG/DL
BILIRUBIN DIRECT+TOT PNL SERPL-MCNC: 0.2 MG/DL (ref 0–0.8)
BILIRUBIN DIRECT+TOT PNL SERPL-MCNC: 0.5 MG/DL (ref 0–0.5)
BUN SERPL-MCNC: 6.7 MG/DL (ref 8.9–20.6)
CALCIUM SERPL-MCNC: 9 MG/DL (ref 8.4–10.2)
CHLORIDE SERPL-SCNC: 104 MMOL/L (ref 98–107)
CO2 SERPL-SCNC: 28 MMOL/L (ref 22–29)
CREAT SERPL-MCNC: 1.51 MG/DL (ref 0.73–1.18)
EOSINOPHIL NFR BLD MANUAL: 0 %
ERYTHROCYTE [DISTWIDTH] IN BLOOD BY AUTOMATED COUNT: 16.5 % (ref 11.5–14.5)
GLOBULIN SER-MCNC: 4.2 GM/DL (ref 2.4–3.5)
GLUCOSE SERPL-MCNC: 96 MG/DL (ref 74–100)
GRANULOCYTES NFR BLD MANUAL: 54 % (ref 43–75)
HCT VFR BLD AUTO: 31.5 % (ref 40–51)
HGB BLD-MCNC: 9.8 GM/DL (ref 13.5–17.5)
HYPOCHROMIA BLD QL SMEAR: ABNORMAL
LYMPHOCYTES NFR BLD MANUAL: 29 % (ref 20.5–51.1)
MAGNESIUM SERPL-MCNC: 1.6 MG/DL (ref 1.6–2.6)
MCH RBC QN AUTO: 26.8 PG (ref 26–34)
MCHC RBC AUTO-ENTMCNC: 31.1 GM/DL (ref 31–37)
MCV RBC AUTO: 86.1 FL (ref 80–100)
METAMYELOCYTES NFR BLD MANUAL: 5 %
MONOCYTES NFR BLD MANUAL: 10 % (ref 2–9)
MYELOCYTES NFR BLD MANUAL: 1 %
PHOSPHATE SERPL-MCNC: 2.8 MG/DL (ref 2.3–4.7)
PLATELET # BLD AUTO: 263 X10(3)/MCL (ref 130–400)
PLATELET # BLD EST: ADEQUATE 10*3/UL
PMV BLD AUTO: 9.3 FL (ref 7.4–10.4)
POLYCHROMASIA BLD QL SMEAR: ABNORMAL
POTASSIUM SERPL-SCNC: 3.8 MMOL/L (ref 3.5–5.1)
PROT SERPL-MCNC: 6.9 GM/DL (ref 6.4–8.3)
RBC # BLD AUTO: 3.66 X10(6)/MCL (ref 4.5–5.9)
RBC MORPH BLD: ABNORMAL
SCHISTOCYTES BLD QL AUTO: ABNORMAL
SODIUM SERPL-SCNC: 141 MMOL/L (ref 136–145)
WBC # SPEC AUTO: 9.8 X10(3)/MCL (ref 4.5–11)

## 2021-03-04 ENCOUNTER — HISTORICAL (OUTPATIENT)
Dept: ADMINISTRATIVE | Facility: HOSPITAL | Age: 41
End: 2021-03-04

## 2021-03-04 LAB
ABS NEUT (OLG): 3.69 X10(3)/MCL (ref 2.1–9.2)
ALBUMIN SERPL-MCNC: 2.8 GM/DL (ref 3.5–5)
ALBUMIN/GLOB SERPL: 0.7 RATIO (ref 1.1–2)
ALP SERPL-CCNC: 104 UNIT/L (ref 40–150)
ALT SERPL-CCNC: 7 UNIT/L (ref 0–55)
AST SERPL-CCNC: 15 UNIT/L (ref 5–34)
BASOPHILS # BLD AUTO: 0.1 X10(3)/MCL (ref 0–0.2)
BASOPHILS NFR BLD AUTO: 2 %
BILIRUB SERPL-MCNC: 0.9 MG/DL
BILIRUBIN DIRECT+TOT PNL SERPL-MCNC: 0.4 MG/DL (ref 0–0.8)
BILIRUBIN DIRECT+TOT PNL SERPL-MCNC: 0.5 MG/DL (ref 0–0.5)
BUN SERPL-MCNC: 3.5 MG/DL (ref 8.9–20.6)
CALCIUM SERPL-MCNC: 8.7 MG/DL (ref 8.4–10.2)
CEA SERPL-MCNC: 667.95 NG/ML (ref 0–3)
CHLORIDE SERPL-SCNC: 102 MMOL/L (ref 98–107)
CO2 SERPL-SCNC: 29 MMOL/L (ref 22–29)
CREAT SERPL-MCNC: 1.3 MG/DL (ref 0.73–1.18)
EOSINOPHIL # BLD AUTO: 0 X10(3)/MCL (ref 0–0.9)
EOSINOPHIL NFR BLD AUTO: 1 %
ERYTHROCYTE [DISTWIDTH] IN BLOOD BY AUTOMATED COUNT: 17.1 % (ref 11.5–14.5)
GLOBULIN SER-MCNC: 3.8 GM/DL (ref 2.4–3.5)
GLUCOSE SERPL-MCNC: 96 MG/DL (ref 74–100)
HCT VFR BLD AUTO: 29.4 % (ref 40–51)
HGB BLD-MCNC: 9.1 GM/DL (ref 13.5–17.5)
IMM GRANULOCYTES # BLD AUTO: 0.01 10*3/UL
IMM GRANULOCYTES NFR BLD AUTO: 0 %
LYMPHOCYTES # BLD AUTO: 1.7 X10(3)/MCL (ref 0.6–4.6)
LYMPHOCYTES NFR BLD AUTO: 28 %
MCH RBC QN AUTO: 26.8 PG (ref 26–34)
MCHC RBC AUTO-ENTMCNC: 31 GM/DL (ref 31–37)
MCV RBC AUTO: 86.7 FL (ref 80–100)
MONOCYTES # BLD AUTO: 0.6 X10(3)/MCL (ref 0.1–1.3)
MONOCYTES NFR BLD AUTO: 9 %
NEUTROPHILS # BLD AUTO: 3.69 X10(3)/MCL (ref 2.1–9.2)
NEUTROPHILS NFR BLD AUTO: 60 %
PLATELET # BLD AUTO: 208 X10(3)/MCL (ref 130–400)
PMV BLD AUTO: 9 FL (ref 7.4–10.4)
POTASSIUM SERPL-SCNC: 4.3 MMOL/L (ref 3.5–5.1)
PROT SERPL-MCNC: 6.6 GM/DL (ref 6.4–8.3)
RBC # BLD AUTO: 3.39 X10(6)/MCL (ref 4.5–5.9)
SODIUM SERPL-SCNC: 142 MMOL/L (ref 136–145)
WBC # SPEC AUTO: 6.1 X10(3)/MCL (ref 4.5–11)

## 2021-03-18 ENCOUNTER — HISTORICAL (OUTPATIENT)
Dept: HEMATOLOGY/ONCOLOGY | Facility: CLINIC | Age: 41
End: 2021-03-18

## 2021-03-18 LAB
ABS NEUT (OLG): 4.56 X10(3)/MCL (ref 2.1–9.2)
ALBUMIN SERPL-MCNC: 3.2 GM/DL (ref 3.5–5)
ALBUMIN/GLOB SERPL: 0.7 RATIO (ref 1.1–2)
ALP SERPL-CCNC: 115 UNIT/L (ref 40–150)
ALT SERPL-CCNC: 6 UNIT/L (ref 0–55)
AST SERPL-CCNC: 17 UNIT/L (ref 5–34)
BASOPHILS # BLD AUTO: 0 X10(3)/MCL (ref 0–0.2)
BASOPHILS NFR BLD AUTO: 1 %
BILIRUB SERPL-MCNC: 1.2 MG/DL
BILIRUBIN DIRECT+TOT PNL SERPL-MCNC: 0.5 MG/DL (ref 0–0.5)
BILIRUBIN DIRECT+TOT PNL SERPL-MCNC: 0.7 MG/DL (ref 0–0.8)
BUN SERPL-MCNC: 5.9 MG/DL (ref 8.9–20.6)
CALCIUM SERPL-MCNC: 9.2 MG/DL (ref 8.4–10.2)
CHLORIDE SERPL-SCNC: 99 MMOL/L (ref 98–107)
CO2 SERPL-SCNC: 30 MMOL/L (ref 22–29)
CREAT SERPL-MCNC: 1.42 MG/DL (ref 0.73–1.18)
EOSINOPHIL # BLD AUTO: 0.1 X10(3)/MCL (ref 0–0.9)
EOSINOPHIL NFR BLD AUTO: 1 %
ERYTHROCYTE [DISTWIDTH] IN BLOOD BY AUTOMATED COUNT: 18.6 % (ref 11.5–14.5)
GLOBULIN SER-MCNC: 4.5 GM/DL (ref 2.4–3.5)
GLUCOSE SERPL-MCNC: 82 MG/DL (ref 74–100)
HCT VFR BLD AUTO: 30.5 % (ref 40–51)
HGB BLD-MCNC: 9.5 GM/DL (ref 13.5–17.5)
IMM GRANULOCYTES # BLD AUTO: 0.02 10*3/UL
IMM GRANULOCYTES NFR BLD AUTO: 0 %
LYMPHOCYTES # BLD AUTO: 1.3 X10(3)/MCL (ref 0.6–4.6)
LYMPHOCYTES NFR BLD AUTO: 20 %
MCH RBC QN AUTO: 27.2 PG (ref 26–34)
MCHC RBC AUTO-ENTMCNC: 31.1 GM/DL (ref 31–37)
MCV RBC AUTO: 87.4 FL (ref 80–100)
MONOCYTES # BLD AUTO: 0.6 X10(3)/MCL (ref 0.1–1.3)
MONOCYTES NFR BLD AUTO: 8 %
NEUTROPHILS # BLD AUTO: 4.56 X10(3)/MCL (ref 2.1–9.2)
NEUTROPHILS NFR BLD AUTO: 70 %
PLATELET # BLD AUTO: 247 X10(3)/MCL (ref 130–400)
PMV BLD AUTO: 9.2 FL (ref 7.4–10.4)
POTASSIUM SERPL-SCNC: 3.9 MMOL/L (ref 3.5–5.1)
PROT SERPL-MCNC: 7.6 GM/DL (ref 6.4–8.3)
RBC # BLD AUTO: 3.49 X10(6)/MCL (ref 4.5–5.9)
SODIUM SERPL-SCNC: 140 MMOL/L (ref 136–145)
WBC # SPEC AUTO: 6.6 X10(3)/MCL (ref 4.5–11)

## 2021-03-31 ENCOUNTER — HISTORICAL (OUTPATIENT)
Dept: HEMATOLOGY/ONCOLOGY | Facility: CLINIC | Age: 41
End: 2021-03-31

## 2021-03-31 LAB
ABS NEUT (OLG): 4.16 X10(3)/MCL (ref 2.1–9.2)
ALBUMIN SERPL-MCNC: 3 GM/DL (ref 3.5–5)
ALBUMIN/GLOB SERPL: 0.7 RATIO (ref 1.1–2)
ALP SERPL-CCNC: 148 UNIT/L (ref 40–150)
ALT SERPL-CCNC: 6 UNIT/L (ref 0–55)
AST SERPL-CCNC: 20 UNIT/L (ref 5–34)
BASOPHILS # BLD AUTO: 0 X10(3)/MCL (ref 0–0.2)
BASOPHILS NFR BLD AUTO: 0 %
BILIRUB SERPL-MCNC: 0.8 MG/DL
BILIRUBIN DIRECT+TOT PNL SERPL-MCNC: 0.4 MG/DL (ref 0–0.5)
BILIRUBIN DIRECT+TOT PNL SERPL-MCNC: 0.4 MG/DL (ref 0–0.8)
BUN SERPL-MCNC: 7 MG/DL (ref 8.9–20.6)
CALCIUM SERPL-MCNC: 8.9 MG/DL (ref 8.4–10.2)
CEA SERPL-MCNC: 622.82 NG/ML (ref 0–3)
CHLORIDE SERPL-SCNC: 98 MMOL/L (ref 98–107)
CO2 SERPL-SCNC: 29 MMOL/L (ref 22–29)
CREAT SERPL-MCNC: 1.53 MG/DL (ref 0.73–1.18)
EOSINOPHIL # BLD AUTO: 0 X10(3)/MCL (ref 0–0.9)
EOSINOPHIL NFR BLD AUTO: 1 %
ERYTHROCYTE [DISTWIDTH] IN BLOOD BY AUTOMATED COUNT: 16.9 % (ref 11.5–14.5)
GLOBULIN SER-MCNC: 4.4 GM/DL (ref 2.4–3.5)
GLUCOSE SERPL-MCNC: 100 MG/DL (ref 74–100)
HCT VFR BLD AUTO: 31.6 % (ref 40–51)
HGB BLD-MCNC: 9.7 GM/DL (ref 13.5–17.5)
IMM GRANULOCYTES # BLD AUTO: 0.02 10*3/UL
IMM GRANULOCYTES NFR BLD AUTO: 0 %
LYMPHOCYTES # BLD AUTO: 1.3 X10(3)/MCL (ref 0.6–4.6)
LYMPHOCYTES NFR BLD AUTO: 21 %
MCH RBC QN AUTO: 26.8 PG (ref 26–34)
MCHC RBC AUTO-ENTMCNC: 30.7 GM/DL (ref 31–37)
MCV RBC AUTO: 87.3 FL (ref 80–100)
MONOCYTES # BLD AUTO: 0.6 X10(3)/MCL (ref 0.1–1.3)
MONOCYTES NFR BLD AUTO: 9 %
NEUTROPHILS # BLD AUTO: 4.16 X10(3)/MCL (ref 2.1–9.2)
NEUTROPHILS NFR BLD AUTO: 68 %
PLATELET # BLD AUTO: 193 X10(3)/MCL (ref 130–400)
PMV BLD AUTO: 9.8 FL (ref 7.4–10.4)
POTASSIUM SERPL-SCNC: 3.8 MMOL/L (ref 3.5–5.1)
PROT SERPL-MCNC: 7.4 GM/DL (ref 6.4–8.3)
RBC # BLD AUTO: 3.62 X10(6)/MCL (ref 4.5–5.9)
SODIUM SERPL-SCNC: 137 MMOL/L (ref 136–145)
WBC # SPEC AUTO: 6.1 X10(3)/MCL (ref 4.5–11)

## 2021-04-07 ENCOUNTER — HISTORICAL (OUTPATIENT)
Dept: INFUSION THERAPY | Facility: HOSPITAL | Age: 41
End: 2021-04-07

## 2021-04-19 ENCOUNTER — HISTORICAL (OUTPATIENT)
Dept: RADIOLOGY | Facility: HOSPITAL | Age: 41
End: 2021-04-19

## 2021-05-11 ENCOUNTER — HISTORICAL (OUTPATIENT)
Dept: LAB | Facility: HOSPITAL | Age: 41
End: 2021-05-11

## 2021-05-11 LAB
ALBUMIN SERPL-MCNC: 2.3 GM/DL (ref 3.5–5)
ALBUMIN/GLOB SERPL: 0.4 RATIO (ref 1.1–2)
ALP SERPL-CCNC: 334 UNIT/L (ref 40–150)
ALT SERPL-CCNC: 37 UNIT/L (ref 0–55)
AMYLASE SERPL-CCNC: 34 UNIT/L (ref 25–125)
AST SERPL-CCNC: 62 UNIT/L (ref 5–34)
BILIRUB SERPL-MCNC: 1.5 MG/DL
BILIRUBIN DIRECT+TOT PNL SERPL-MCNC: 0.5 MG/DL
BILIRUBIN DIRECT+TOT PNL SERPL-MCNC: 1 MG/DL (ref 0–0.5)
BUN SERPL-MCNC: 18 MG/DL (ref 8.9–20.6)
CALCIUM SERPL-MCNC: 8.9 MG/DL (ref 8.4–10.2)
CHLORIDE SERPL-SCNC: 98 MMOL/L (ref 98–107)
CHOLEST SERPL-MCNC: 242 MG/DL
CHOLEST/HDLC SERPL: 10 {RATIO} (ref 0–5)
CK SERPL-CCNC: 620 U/L (ref 30–200)
CO2 SERPL-SCNC: 24 MEQ/L (ref 22–29)
CREAT SERPL-MCNC: 1.31 MG/DL (ref 0.73–1.18)
GLOBULIN SER-MCNC: 5.2 GM/DL (ref 2.4–3.5)
GLUCOSE SERPL-MCNC: 106 MG/DL (ref 74–100)
HDLC SERPL-MCNC: 24 MG/DL (ref 35–60)
LDLC SERPL CALC-MCNC: 185 MG/DL (ref 50–140)
LIPASE SERPL-CCNC: 9 U/L
MAGNESIUM SERPL-MCNC: 2.2 MG/DL (ref 1.6–2.6)
PHOSPHATE SERPL-MCNC: 2.6 MG/DL (ref 2.3–4.7)
POTASSIUM SERPL-SCNC: 4.1 MMOL/L (ref 3.5–5.1)
PROT SERPL-MCNC: 7.5 GM/DL (ref 6.4–8.3)
SODIUM SERPL-SCNC: 134 MMOL/L (ref 136–145)
TRIGL SERPL-MCNC: 165 MG/DL (ref 34–140)
URATE SERPL-MCNC: 9.6 MG/DL (ref 3.8–7)
VLDLC SERPL CALC-MCNC: 33 MG/DL

## 2021-05-18 ENCOUNTER — HISTORICAL (OUTPATIENT)
Dept: INFUSION THERAPY | Facility: HOSPITAL | Age: 41
End: 2021-05-18

## 2021-05-24 ENCOUNTER — HISTORICAL (OUTPATIENT)
Dept: LAB | Facility: HOSPITAL | Age: 41
End: 2021-05-24

## 2021-05-24 LAB
ALBUMIN SERPL-MCNC: 2.2 GM/DL (ref 3.5–5)
ALBUMIN/GLOB SERPL: 0.4 RATIO (ref 1.1–2)
ALP SERPL-CCNC: 449 UNIT/L (ref 40–150)
ALT SERPL-CCNC: 101 UNIT/L (ref 0–55)
AMYLASE SERPL-CCNC: 43 UNIT/L (ref 25–125)
AST SERPL-CCNC: 129 UNIT/L (ref 5–34)
BILIRUB SERPL-MCNC: 2.7 MG/DL
BILIRUBIN DIRECT+TOT PNL SERPL-MCNC: 0.7 MG/DL
BILIRUBIN DIRECT+TOT PNL SERPL-MCNC: 2 MG/DL (ref 0–0.5)
BUN SERPL-MCNC: 47 MG/DL (ref 8.9–20.6)
CALCIUM SERPL-MCNC: 9 MG/DL (ref 8.4–10.2)
CHLORIDE SERPL-SCNC: 100 MMOL/L (ref 98–107)
CHOLEST SERPL-MCNC: 217 MG/DL
CHOLEST/HDLC SERPL: 14 {RATIO} (ref 0–5)
CK SERPL-CCNC: 1183 U/L (ref 30–200)
CO2 SERPL-SCNC: 22 MEQ/L (ref 22–29)
CREAT SERPL-MCNC: 2.25 MG/DL (ref 0.73–1.18)
GLOBULIN SER-MCNC: 5 GM/DL (ref 2.4–3.5)
GLUCOSE SERPL-MCNC: 89 MG/DL (ref 74–100)
HDLC SERPL-MCNC: 15 MG/DL (ref 35–60)
LDLC SERPL CALC-MCNC: 169 MG/DL (ref 50–140)
LIPASE SERPL-CCNC: 24 U/L
MAGNESIUM SERPL-MCNC: 2.3 MG/DL (ref 1.6–2.6)
PHOSPHATE SERPL-MCNC: 3.6 MG/DL (ref 2.3–4.7)
POTASSIUM SERPL-SCNC: 5 MMOL/L (ref 3.5–5.1)
PROT SERPL-MCNC: 7.2 GM/DL (ref 6.4–8.3)
SODIUM SERPL-SCNC: 137 MMOL/L (ref 136–145)
TRIGL SERPL-MCNC: 165 MG/DL (ref 34–140)
URATE SERPL-MCNC: 11.9 MG/DL (ref 3.8–7)
VLDLC SERPL CALC-MCNC: 33 MG/DL

## 2022-04-10 ENCOUNTER — HISTORICAL (OUTPATIENT)
Dept: ADMINISTRATIVE | Facility: HOSPITAL | Age: 42
End: 2022-04-10
Payer: MEDICARE

## 2022-04-24 VITALS
BODY MASS INDEX: 24.43 KG/M2 | DIASTOLIC BLOOD PRESSURE: 89 MMHG | OXYGEN SATURATION: 98 % | HEIGHT: 73 IN | WEIGHT: 184.31 LBS | SYSTOLIC BLOOD PRESSURE: 126 MMHG

## 2022-04-30 NOTE — OP NOTE
DATE OF SURGERY:        SURGEON:  Freddie Nelson MD    attending physician:  Freddie Nelson MD    PREOPERATIVE DIAGNOSIS:  Personal history of colon cancer.    POSTOPERATIVE DIAGNOSIS:  Personal history of colon cancer.    PROCEDURE:  Colonoscopy with snare cautery polypectomy.    COMPLICATIONS:  None.    BLOOD LOSS:  Minimal.    FINDINGS:  Widely patent anastomosis.  There was a large polyp in the transverse colon, probably 3 cm in size on a stalk.  Luckily, I was able to hot snare it and then tattoo the location.  The polyp did not look malignant, but it was large enough to where if malignancy is present within the polyp I would not be surprised.  Recommendations would depend on pathology.    DETAILS OF THE PROCEDURE:  After the risks and benefits of the procedure were explained to the patient, informed consent was obtained and he was taken to endoscopy on 09/27/2018.  He was placed on the endoscopy table in the left lateral decubitus position.  Over the course of the procedure, he was administered propofol by Anesthesiology.  I performed a rectal examination which was negative.  I then inserted a colonoscope all the way to the anastomosis which was between the small bowel and the proximal transverse colon.  It was widely patent.  I centered the scope and slowly began to retract it looking around in a 360-degree fashion.  In the middle of the transverse colon, there was a large pedunculated polyp.  We had to use the largest snare that we had to get around it to remove it.  I only attempted this because I knew that it was on a stalk.  We were able to burn it off the stalk with no bleeding.  The polyp was removed and it looked to be about 3 cm in size.  I placed the scope back into the rectum and advanced it all the way back to the anastomosis site and retracted it again.  The previous polypectomy site looked good.  There was no bleeding.  I injected it in 2 different locations with Zahida ink.  I  then began to retract the scope again to see the rest of the colon and it was clear.  I did not see any other abnormalities, no masses, no polyps.  The scope was removed.  He tolerated the procedure well and was taken to the recovery room in stable condition.      RECOMMENDATIONS:  If this is a benign polyp, he will need to be re-scoped in a year.  If this polyp has any cancer in it, then he will need partial colectomy, timing per Oncology.        ______________________________  MD MEGAN Lin/CLAIR  DD:  09/27/2018  Time:  02:59PM  DT:  09/27/2018  Time:  03:53PM  Job #:  [id number]

## 2022-04-30 NOTE — ED PROVIDER NOTES
"   Patient:   Heron Ceballos             MRN: [id number]            FIN: [id number]               Age:   39 years     Sex:  Male     :  1980   Associated Diagnoses:   Abdominal pain, acute; Acquired neutropenia; Acute vomiting; Acute diarrhea   Author:   Gely Conte PA-C      Basic Information   Time seen: Immediately upon arrival.   History source: Patient.   Arrival mode: Private vehicle.   History limitation: None.   Additional information: Chief Complaint from Nursing Triage Note : Chief Complaint   2020 13:59 CDT      Chief Complaint           c/o abdominal cramping with vomiting since last pm. has colon ca. taking chemo at present. stated took a home remedy otc for nausea. after that he started with pain  .      History of Present Illness   The patient presents with vomiting.  The onset was 1  days ago.  The course/duration of symptoms is constant.  The character of symptoms is unknown.  The degree at present is minimal.  The exacerbating factor is eating.  The relieving factor is none.  Risk factors consist of recent chemotherapy.  Therapy today: none.  Associated symptoms: abdominal pain, diarrhea, denies fever, denies chills, denies chest pain, denies shortness of breath, denies headache and denies blood in stool.  Additional history: 38 y/o AAM presents to the ED with complaints of abdominal pain, nausea, and vomiting x last night. Has colon cancer. Last chemotherapy was 1 week ago. States his stomach got upset last night and his sister gave him an otc stomach medication which he thinks is called "settle stomach". He states the medication tasted like peppermint and it upset his stomach even more. Complaints of 2 episodes of vomiting and diarrhea today. Denies blood in the stool, sick contacts, and fever. .        Review of Systems   Constitutional symptoms:  Negative except as documented in HPI.   Skin symptoms:  Negative except as documented in HPI.   Respiratory symptoms:  " Negative except as documented in HPI.   Cardiovascular symptoms:  Negative except as documented in HPI.   Gastrointestinal symptoms:  Moderate, epigastric.    Genitourinary symptoms:  Negative except as documented in HPI.   Musculoskeletal symptoms:  Negative except as documented in HPI.   Neurologic symptoms:  Negative except as documented in HPI.   Psychiatric symptoms:  Negative except as documented in HPI.      Health Status   Allergies:    Allergic Reactions (All)  Severity Not Documented  DilTIAZem- Headache.  Norvasc- Headache.  Canceled/Inactive Reactions (All)  No Known Allergies,    Allergies (2) Active Reaction  dilTIAZem Headache  Norvasc Headache  .   Medications:  (Selected)   Inpatient Medications  Ordered  Avastin (for IVPB): 715 mg, IV, Once-chemo, first dose 10/15/18 9:27:00 CDT, stop date 10/15/18 9:27:00 CDT, Days 1  Heparin Flush 100 U/mL - 5 mL: 500 units, form: Injection, IV Push, Once-chemo, first dose 12/19/18 9:23:00 CST, stop date 12/19/18 9:23:00 CST, Routine, Days 3  IVF Normal Saline NS Bolus 1000ml 1,000 mL: 1,000 mL, 1,000 mL, IV, Bolus, order duration: 1 dose(s), start date 06/21/20 14:19:00 CDT, 2.27, m2  Phenergan: 12.5 mg, form: Injection, IM, Once, first dose 06/27/18 8:00:00 CDT, stop date 06/27/18 8:00:00 CDT, Give with first dose of IV Morphine Sulfate.  Zofran (for IVPB) 16 mg + dexamethasone 10 mg/mL injectable solution 10 mg: form: Injection, IV Piggyback, Once-chemo, Infuse over: 20 minute(s), first dose 02/26/20 8:27:00 CST, stop date 02/26/20 8:27:00 CST, Routine, Days 1  Zofran (for IVPB) 16 mg + dexamethasone 10 mg/mL injectable solution 10 mg: form: Injection, IV Piggyback, Once-chemo, Infuse over: 20 minute(s), first dose 03/25/20 8:57:00 CDT, stop date 03/25/20 8:57:00 CDT, Routine, Days 1  dexamethasone (for IVPB): 10 mg, IV Piggyback, Once-chemo, Infuse over: 20 minute(s), first dose 04/23/19 21:44:00 CDT, stop date 04/23/19 21:44:00 CDT, Days 1  dexamethasone  (for IVPB): 10 mg, IV Piggyback, Once-chemo, Infuse over: 20 minute(s), first dose 07/10/18 10:15:00 CDT, stop date 07/10/18 10:15:00 CDT, Days 1  dexamethasone (for IVPB): 10 mg, IV Piggyback, Once-chemo, Infuse over: 20 minute(s), first dose 07/23/19 9:39:00 CDT, stop date 07/23/19 9:39:00 CDT, Days 1  dexamethasone (for IVPB): 10 mg, IV Piggyback, Once-chemo, Infuse over: 20 minute(s), first dose 09/18/19 8:24:00 CDT, stop date 09/18/19 8:24:00 CDT, Days 1  dexamethasone (for IVPB): 10 mg, IV Piggyback, Once-chemo, Infuse over: 20 minute(s), first dose 10/02/19 8:47:00 CDT, stop date 10/02/19 8:47:00 CDT, Days 1  fluorouracil (for IVPB) -CCA: 1,028 mg, IV Piggyback, Once-chemo, Infuse over: 30 minute(s), first dose 09/05/18 11:59:00 CDT, stop date 09/05/18 11:59:00 CDT, Days 1  hydrALAZINE: 5 mg, form: Injection, IV Push, Once, first dose 06/27/18 8:00:00 CDT, stop date 06/27/18 8:00:00 CDT, MAY REPEAT Q 10 MINUTES UP TO 20 MG  meperidine 25 mg/mL injectable solution: 25 mg, form: Injection, IM, Once, first dose 06/27/18 8:00:00 CDT, stop date 06/27/18 8:00:00 CDT, Give with first dose of IV Morphine Sulfate.  Prescriptions  Prescribed  levothyroxine 75 mcg (0.075 mg) oral tablet: 75 mcg = 1 tab(s), Oral, Daily, # 30 tab(s), 3 Refill(s), Pharmacy: [facility], 184, cm, Height/Length Dosing, 03/10/20 10:39:00 CDT, 135.9, kg, Weight Dosing, 03/10/20 10:39:00 CDT  megestrol 40 mg/mL oral suspension: 800 mg = 20 mL, Oral, Daily, # 600 mL, 1 Refill(s), Pharmacy: [facility], 184, cm, Height/Length Dosing, 03/25/20 8:53:00 CDT, 136.3, kg, Weight Dosing, 03/25/20 8:53:00 CDT  potassium chloride 10 mEq oral CAPSULE extended release: 10 mEq = 1 cap(s), Oral, Daily, # 30 cap(s), 0 Refill(s), Pharmacy: [facility], 182, cm, Height/Length Dosing, 05/20/20 9:08:00 CDT, 114.6, kg, Weight Dosing, 05/20/20 9:08:00 CDT  potassium chloride 20 mEq oral TABLET extended release: 20 mEq = 1 tab(s),  Oral, BID, # 14 tab(s), 0 Refill(s), Pharmacy: [facility], 182, cm, Height/Length Dosing, 06/15/20 9:10:00 CDT, 111.2, kg, Weight Dosing, 06/15/20 9:10:00 CDT  Documented Medications  Documented  hydrochlorothiazide-lisinopril 12.5 mg-20 mg oral tablet: 2 tab(s), Oral, Daily.      Past Medical/ Family/ Social History   Medical history:    Active  CA - Cancer of colon ([id number]), Reviewed as documented in chart.   Surgical history:    Colonoscopy (None) on 2019 at 39 Years.  Comments:  2019 9:34 THOMAS - Stefani OLIVARES, Amy BRADY  auto-populated from documented surgical case  Colonoscopy (None) on 2018 at 37 Years.  Comments:  2018 15:15 Lanette Aquino RN  auto-populated from documented surgical case  Colon Tattoo (None) on 2018 at 37 Years.  Comments:  2018 15:15 Lanette Aquino RN  auto-populated from documented surgical case  Polypectomy (None) on 2018 at 37 Years.  Comments:  2018 15:15 Lanette Aquino RN  auto-populated from documented surgical case  Catheter Insertion Mediport (None) on 2018 at 37 Years.  Comments:  2018 8:04 Hamida Islas RN  auto-populated from documented surgical case  Appendectomy Laparoscopic (None) on 2018 at 37 Years.  Comments:  2018 9:05 Hamida Islas RN  auto-populated from documented surgical case  colon resection., Reviewed as documented in chart.   Family history:    Primary malignant neoplasm of prostate  Father  Heart failure.  Brother ()  Renal failure syndrome.  Mother  , Reviewed as documented in chart.   Social history:    Social & Psychosocial Habits    Alcohol  2018  Use: Never    Employment/School  2018  Status: Employed    Exercise  10/31/2019  Exercise type: Walking    Home/Environment  2018  Lives with: Children, Spouse    Nutrition/Health  2018  Type of diet: Regular    Sexual  01/10/2019  Do you think of your sexual orientation as:  Straight or heterosexual    What is your current gender identity? (Check all that apply) Identifies as male    Substance Use  11/12/2018  Use: Never    Tobacco  06/15/2020  Use: Never (less than 100 in l    Patient Wants Consult For Cessation Counseling N/A    Concerns about tobacco use in household: No    Smokeless tobacco use: Never    06/17/2020  Use: Never (less than 100 in l    Patient Wants Consult For Cessation Counseling N/A    06/21/2020  Use: Never (less than 100 in l    Patient Wants Consult For Cessation Counseling No    Abuse/Neglect  06/15/2020  SHX Any signs of abuse or neglect No    Feels unsafe at home: No    Safe place to go: Yes    06/17/2020  SHX Any signs of abuse or neglect No    06/21/2020  SHX Any signs of abuse or neglect No    Feels unsafe at home: No    Safe place to go: Yes    Spiritual/Cultural  10/31/2019  Christian Preference Rastafari  , Reviewed as documented in chart.   Problem list:    Active Problems (10)  Adenocarcinoma of cecum   CA - Cancer of colon   Chemotherapy-induced peripheral neuropathy   HTN (hypertension)   Hyperglobulinemia   Iron deficiency anemia   Liver metastases   Lung metastases   Morbid obesity   Peritoneal carcinomatosis   .      Physical Examination               Vital Signs   Vital Signs   6/21/2020 13:59 CDT      Temperature Oral          36.6 DegC                             Temperature Oral (calculated)             97.88 DegF                             Peripheral Pulse Rate     115 bpm  HI                             Respiratory Rate          20 br/min                             SpO2                      99 %                             Oxygen Therapy            Room air                             Systolic Blood Pressure   117 mmHg                             Diastolic Blood Pressure  88 mmHg  .      Vital Signs (last 24 hrs)_____  Last Charted___________  Temp Oral     36.6 DegC  (JUN 21 13:59)  Heart Rate Peripheral   H 115bpm  (JUN 21 13:59)  Resp  Rate         20 br/min  (JUN 21 13:59)  SBP      117 mmHg  (JUN 21 13:59)  DBP      88 mmHg  (JUN 21 13:59)  SpO2      99 %  (JUN 21 13:59)  Weight      105.7 kg  (JUN 21 13:59)  .   Measurements   6/21/2020 13:59 CDT      Weight Dosing             105.7 kg                             Weight Measured           105.7 kg                             Weight Measured and Calculated in Lbs     233.03 lb  .   Basic Oxygen Information   6/21/2020 13:59 CDT      SpO2                      99 %                             Oxygen Therapy            Room air  .   General:  Alert, mild distress.    Skin:  Warm, dry, normal for ethnicity.    Head:  Normocephalic, atraumatic.    Neck:  Supple, no tenderness.    Eye:  Pupils are equal, round and reactive to light.   Cardiovascular:  Regular rate and rhythm, No murmur, Normal peripheral perfusion, No edema.    Respiratory:  Lungs are clear to auscultation, respirations are non-labored, breath sounds are equal, Symmetrical chest wall expansion.    Gastrointestinal:  Soft, Non distended, Normal bowel sounds, Tenderness: Mild, epigastric, Guarding: Negative, Rebound: Negative, Organomegaly: Negative, Trauma: Negative.    Back:  Nontender, Normal range of motion.    Neurological:  Alert and oriented to person, place, time, and situation, No focal neurological deficit observed.    Psychiatric:  Cooperative, appropriate mood & affect, normal judgment, non-suicidal.       Medical Decision Making   Documents reviewed:  Emergency department nurses' notes, emergency department records, prior records.    Results review:  Lab results : Lab View   6/21/2020 19:39 CDT      Lactic Acid Lvl           1.7 mmol/L    6/21/2020 19:15 CDT      COVID-19 Rapid            NEGATIVE    6/21/2020 17:30 CDT      UA Appear                 Clear                             UA Color                  YELLOW                             UA Spec Grav              1.018                             UA Bili                    0.5 mg/dL                             UA pH                     6.0                             UA Urobilinogen           3 mg/dL                             UA Blood                  Negative                             UA Glucose                Negative                             UA Ketones                20 mg/dL                             UA Protein                100 mg/dL                             UA Nitrite                Negative                             UA Leuk Est               Negative                             UA WBC Interp             3-5 /HPF                             UA RBC Interp             0-2 /HPF                             UA Bact Interp            None Seen /HPF                             UA Squam Epi Interp        /LPF                             UA Hyal Cast Interp       3-5 /LPF    6/21/2020 16:18 CDT      Lactic Acid Lvl           2.6 mmol/L  CRIT    6/21/2020 14:43 CDT      Sodium Lvl                129 mmol/L  LOW                             Potassium Lvl             2.8 mmol/L  CRIT                             Chloride                  91 mmol/L  LOW                             CO2                       27 mmol/L                             Calcium Lvl               8.9 mg/dL                             Glucose Lvl               127 mg/dL  HI                             BUN                       13 mg/dL                             Creatinine                1.20 mg/dL                             eGFR-AA                   87 mL/min  LOW                             eGFR-SHANT                  72 mL/min  LOW                             Amylase Lvl               72 unit/L                             Bili Total                2.4 mg/dL  HI                             Bili Direct               1.2 mg/dL  HI                             Bili Indirect             1.2 mg/dL                             AST                       246 unit/L  HI                             ALT                        443 unit/L  HI                             Alk Phos                  152 unit/L  HI                             Total Protein             7.6 gm/dL                             Albumin Lvl               3.1 gm/dL  LOW                             Globulin                  4.50 gm/mL  HI                             A/G Ratio                 0.7 ratio  LOW                             Lipase Lvl                120 unit/L                             WBC                       1.0 x10(3)/mcL  LOW                             RBC                       4.03 x10(6)/mcL  LOW                             Hgb                       11.2 gm/dL  LOW                             Hct                       34.0 %  LOW                             Platelet                  175 x10(3)/mcL                             MCV                       84.4 fL                             MCH                       27.8 pg                             MCHC                      32.9 gm/dL                             RDW                       15.5 %  HI                             MPV                       10.9 fL  HI                             Abs Neut                  0.32 x10(3)/mcL  LOW                             Segs Man                  8 %  LOW                             Band Man                  24 %  HI                             Lymph Man                 60.0 %  HI                             Monocyte Man              4 %                             Eos Man                   0 %                             Basophil Man              0 %                             Abn Lymph Man             4 %  HI                             Platelet Est              Adequate                             Anisocyte                 1+                             Polychrom                 1+                             RBC Morph                 Abnormal                             Ovalocytes                1+    ,    No qualifying data available.     Radiology results:  Rad Results (ST)  < 12 hrs   Accession: [id number]  Order: CT Abdomen and Pelvis W Contrast  Report Dt/Tm: 06/21/2020 21:14  Report:      CT abdomen pelvis with contrast     Technique: Images of the abdomen and pelvis were obtained with  contrast.     Total DLP: 1536.88 mGy cm      Indication: Abdominal pain.     Comparison: CT of the abdomen and pelvis 6/12/2020.     Findings:   There is an unchanged 6 nodule in the medial basilar segment of the  right lower lobe.. The heart is normal in size.     The hypodense nodule in the posterior right hepatic lobe is unchanged.  The portal vein is patent. The gallbladder, spleen, pancreas, and  adrenal glands are normal. There is a simple cyst in the left kidney.  The right kidney is normal. There is no hydronephrosis or  nephrolithiasis.     There are postsurgical changes in the terminal ileum. There is  circumferential wall thickening, hyperemia, and surrounding  inflammatory stranding involving the distal ileum just proximal to the  anastomosis. There is no extraluminal air. There is no abscess. This  was not present on the prior exam. There is mild wall thickening and  apparent stricturing in the rectosigmoid. There is no pelvic or  retroperitoneal adenopathy. The aorta is nonaneurysmal. There is no  lytic or blastic osseous lesion. The mesenteric soft tissue nodule  just right of midline best visualized on series 2 image 43 is  unchanged. The midline epigastric nodule is unchanged and best  visualized on image 36.     Impression:     1. Circumferential wall thickening, hyperemia, and surrounding  inflammatory stranding involving the distal ileum just proximal to the  ileocolonic anastomosis. Additionally there is minimal stricturing in  the rectosigmoid. These findings suggest an inflammatory colitis such  as Crohn's.  2. Unchanged hepatic, pulmonary, and mesenteric metastatic disease.    Accession: [id number]  Order: US Abdomen Limited  Report  Dt/Tm: 06/21/2020 18:42  Report:   Clinical History  Epigastric pain.     Technique  Limited abdominal Ultrasound Images obtained in grayscale and color.     Comparison  CT of the abdomen and pelvis 6/12/2020.     Findings  The LIVER is normal in size and contour at 14.6 cm (sagittal right  lobe). Hepatic parenchyma has normal echogenicity with normal  delineation of the periportal triads. No definite intrahepatic masses  are noted. The portal vein is patent with hepatopetal flow. There is a  hypoechoic well-circumscribed lesion in the right upper quadrant  measuring 6.4 x 8.9 x 5.3 cm with low-level internal echoes.     The BILIARY SYSTEM is normal in caliber; the common hepatic duct  measures 3 mm. There are no  stones seen in the GALL BLADDER. There is  no evidence of acute cholecystitis.      The PANCREATIC head and body are partially visualized but grossly  normal in appearance. The pancreatic duct is not dilated.     The RIGHT KIDNEY is normal in size at 9.5 x 6.0 x 5.2 cm and  echogenicity/texture. No stones or hydronephrosis seen. No solid  masses are noted.      The AORTA and IVC are partially visualized and unremarkable.     Impression  Well-circumscribed hypoechoic lesion in the right upper quadrant  measuring up to 8.9 cm with low-level internal echoes. An abscess  cannot be excluded. Recommend CT of the abdomen and pelvis with IV  contrast.         Accession: [id number]  Order: XR Abdomen Flat and Erect  Report Dt/Tm: 06/21/2020 15:18  Report:   XR Abdomen Flat and Erect     REASON FOR STUDY: Abdominal Pain     TECHNIQUE: Supine and upright radiographs of the abdomen     COMPARISON: CT from 6/12/2020     FINDINGS: Clear lung bases. Right-sided ileocolonic anastomosis. No  dilated bowel appreciated. No significant air-fluid levels or  definitive findings for pneumoperitoneum. Small volume stool.     IMPRESSION: Nonobstructive bowel gas pattern.      .      Reexamination/ Reevaluation   Vital signs    Basic Oxygen Information   6/21/2020 13:59 CDT      SpO2                      99 %                             Oxygen Therapy            Room air     Course: unchanged.   Pain status: decreased.   Assessment: exam unchanged.   Notes: Reassessed patient after CT scan. He is laying comfortably in bed and states he is feeling much better. Discussed treatment plan with him and his wife (on the phone).  He verbalized understanding. He is stable for admission. .      Impression and Plan   Diagnosis   Abdominal pain, acute (OYU46-DW R10.9)   Acquired neutropenia (ZSH84-MQ D70.9)   Acute vomiting (CFR52-GN R11.10)   Acute diarrhea (KBS70-HS R19.7)      Calls-Consults   -  6/21/2020 21:48:00 , Internal Medicine.    Plan   Condition: Stable.    Disposition: Admit time  6/21/2020 21:52:00, Admit to Inpatient Unit.    Counseled: Patient, Regarding diagnosis, Regarding diagnostic results, Regarding treatment plan, Regarding prescription, Patient indicated understanding of instructions.

## 2022-04-30 NOTE — OP NOTE
Patient:   Heron Ceballos             MRN: [id number]            FIN: [id number]               Age:   37 years     Sex:  Male     :  1980   Associated Diagnoses:   Colon cancer stage 1   Author:   Agustin Tiwari MD      Operative Note   Operative Information   Date/ Time:  2018 08:03:00.     Procedures Performed: Mediport insertion.     Preoperative Diagnosis: Colon cancer stage 1 (WDU18-MS C18.9).     Postoperative Diagnosis: Colon cancer stage 1 (UVP56-OW C18.9).     Surgeon: Agustin Tiwari MD.     Assistant: Dharmesh Hartman MD     Anesthesia: LMA.     Medications: 1% lidocaine.     Esimated blood loss: loss  5  cc.     Complications: None.     Notes: After risks and benefits were discussed, the patient was taken to the operating room, placed supine, put under general anesthesia with LMA.The left subclavian vein was chosen as a target. The patient's neck, chest, and shoulders were prepped and draped in usual sterile fashion. The left subclavian vein was cannulated and the wire was passed, which was in good position under fluoro, using Seldinger Technique. A pocket above the fascia below the left clavicle using scalpel and electrocautery, and a MediPort was placed into the pocket snugly. A tunneler was then used to pass a single lumen catheter from the port site to the cannulation site. The catheter was measured and trimmed to size. A dilator and introducer were then passed over the wire under fluoroscopy, dilator was removed, and the catheter was passed through the introducer. After confirming good position under fluoro, a Turk needle was inserted. The port withdrew and flushed well. 1% lidocaine was then administered around both incision sites. The chest pocket was then closed using 3-0 Vicryl, 4-0 PDS for the skin, and dressed with dermabond. Cannulation site was closed with a single 4-0 PDS stitch. The case was terminated, and the patient was transferred to PACU, where confimation of  placement was acheived with CXR.     Dr. Hartman was present for the procedure    Agustin Tiwari MD  [facility], PGY-1   .

## 2022-04-30 NOTE — H&P
Patient:   Heron Ceballos             MRN: [id number]            FIN: [id number]               Age:   37 years     Sex:  Male     :  1980   Associated Diagnoses:   None   Author:   Agustin Tiwari MD      [facility] H&P Update     HPI:  Patient is a 38yo man 3-1/2 weeks status post right hemicolectomy for T4 invasive colorectal cancer. Patient is doing well from a postoperative standpoint tolerating a diet and having normal bowel movements. He says that his appetite is increased over the last week. Denies any fevers or chills or shortness of breath. Seen by oncology and starting chemotherapy on July 10  PMH: None  PSH: R hemicolectomy,   Allergies: NKDA  Meds: Norco  Social: non-smoker, denies alcohol, denies illicits     Objective:  AFVSS  Physical Exam:  Gen: NAD, alert  CV: RRR  Chest: CTAB  Abd: s/nt/nd. Midline incision healing well  2+pulses distally     Labs:   WBC: 3.9  H/H: 8.6/30.8    Imaging: No new     Assessment/Plan:  Patient is a 37yo man with metastatic colon cancer  - Proceed to OR for mediport placement  - Consents obtained     Agustin Tiwari MD  [facility], PGY-I

## 2022-05-04 NOTE — HISTORICAL [facility]ERNER
This is a historical note converted from [employer]. Formatting and pictures may have been removed.  Please reference [employer] for original formatting and attached multimedia. History of Present Illness  -Prescription for Lonsurf written.  -Dose: 80 mg (based on trifluridine component), i.e., 4 tablets of 20 mg each, twice daily, with food.  -Obtain blood counts prior to starting each cycle and on day 15 of each cycle  -Do not initiate a cycle until ANC >1500/mm? or febrile neutropenia is resolved, platelets are >75,000/mm?, and/or grade 3 or 4 nonhematologic reactions are <grade 1  -Moderate emetic potential; administer Zofran 4 mg p.o. an hour before Lonsurf   Problem List/Past Medical History  Ongoing  Adenocarcinoma of cecum  CA - Cancer of colon  Chemotherapy-induced peripheral neuropathy  HTN (hypertension)  Hyperglobulinemia  Iron deficiency anemia  Liver metastases  Lung metastases  Morbid obesity  Peritoneal carcinomatosis  Historical  No qualifying data  Procedure/Surgical History  Colonoscopy (None) (11/14/2019)  Colonoscopy, flexible; diagnostic, including collection of specimen(s) by brushing or washing, when performed (separate procedure) (11/14/2019)  Inspection of Lower Intestinal Tract, Via Natural or Artificial Opening Endoscopic (11/14/2019)  Colon Tattoo (None) (09/27/2018)  Colonoscopy (None) (09/27/2018)  Colonoscopy, flexible; with directed submucosal injection(s), any substance (09/27/2018)  Colonoscopy, flexible; with removal of tumor(s), polyp(s), or other lesion(s) by snare technique (09/27/2018)  Excision of Transverse Colon, Via Natural or Artificial Opening Endoscopic, Diagnostic (09/27/2018)  Introduction of Other Therapeutic Substance into Lower GI, Via Natural or Artificial Opening Endoscopic (09/27/2018)  Polypectomy (None) (09/27/2018)  Catheter Insertion Mediport (None) (06/27/2018)  Fluoroscopy of Superior Vena Cava using Low Osmolar Contrast, Guidance (06/27/2018)  Insertion of  Infusion Device into Superior Vena Cava, Percutaneous Approach (06/27/2018)  Insertion of tunneled centrally inserted central venous access device, with subcutaneous port; age 5 years or older (06/27/2018)  Appendectomy Laparoscopic (None) (05/31/2018)  Inspection of Lower Intestinal Tract, Percutaneous Endoscopic Approach (05/31/2018)  Resection of Appendix, Open Approach (05/31/2018)  Resection of Right Large Intestine, Open Approach (05/31/2018)  colon resection   Medications  Avastin (for IVPB), 715 mg, 5 mg/kg, IV, Once-chemo  dexamethasone (for IVPB)  dexamethasone (for IVPB)  dexamethasone (for IVPB)  dexamethasone (for IVPB)  dexamethasone (for IVPB)  fluorouracil (for IVPB) -CCA  Heparin Flush 100 U/mL - 5 mL, 500 units= 5 mL, IV Push, Once-chemo  hydrALAZINE, 5 mg= 0.25 mL, IV Push, Once  hydrochlorothiazide-lisinopril 12.5 mg-20 mg oral tablet, 2 tab(s), Oral, Daily, 3 refills  levothyroxine 112 mcg (0.112 mg) oral tablet, 112 mcg= 1 tab(s), Oral, Daily, 2 refills  Lonsurf 20 mg-8.19 mg oral tablet, See Instructions, 4 refills  megestrol 40 mg/mL oral suspension, 800 mg= 20 mL, Oral, Daily, 1 refills  meperidine 25 mg/mL injectable solution, 25 mg= 1 mL, IM, Once  ondansetron 8 mg oral tablet, 8 mg= 1 tab(s), Oral, TID  Phenergan, 12.5 mg= 0.5 mL, IM, Once  potassium chloride 20 mEq oral TABLET extended release, 20 mEq= 1 tab(s), Oral, Daily, 3 refills  Zofran (for IVPB) 16 mg + dexamethasone 10 mg/mL injectable solution 10 mg  Zofran (for IVPB) 16 mg + dexamethasone 10 mg/mL injectable solution 10 mg  Allergies  Norvasc?(Headache)  dilTIAZem?(Headache)  Social History  Abuse/Neglect  No, No, Yes, 12/03/2020  Alcohol  Never, 11/19/2020  Employment/School  Employed, 06/12/2018  Exercise  Exercise type: Walking., 10/31/2019  Home/Environment  Lives with Children, Spouse., 06/12/2018  Nutrition/Health  Regular, 06/12/2018  Sexual  Sexual orientation: Straight or heterosexual. Gender Identity Identifies as  male., 01/10/2019  Spiritual/Cultural  Taoism, 10/31/2019  Substance Use  Never, 11/19/2020  Tobacco  Never (less than 100 in lifetime), No, 12/03/2020  Family History  Alcoholism.: Negative: Father.  Heart failure.: Brother.  Primary malignant neoplasm of prostate: Father.  Renal failure syndrome.: Mother.  Immunizations  Vaccine Date Status   influenza virus vaccine, inactivated 12/03/2020 Given   influenza virus vaccine, inactivated 11/19/2019 Given   influenza virus vaccine, inactivated 12/17/2018 Given

## 2022-05-04 NOTE — HISTORICAL [facility]ER
This is a historical note converted from [place]. Formatting and pictures may have been removed.  Please reference [place] for original formatting and attached multimedia. History of Present Illness  Problem list  1. 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 ?Perforated cecal mass with peritonitis. ?All margins negative. ?Perineural invasion positive. ?13 out of 21 lymph nodes positive. ?Omentum positive for metastatic adenocarcinoma.?????  ?   Past medical history:?Obesity.  Social history:?. ?Has 2 children. ?Lives in [place], [place]. ?Drives trucks for living. ?Denies tobacco, alcohol, or illicit drug abuse.  Family history:?Father had colon cancer at age 50.  ?   Reason for visit:  Follow-up visit for?metastatic?adenocarcinoma of cecum?  ?   ?  History of present illness:  Patient is being followed for metastatic adenocarcinoma of cecum.? He also has iron deficiency anemia, hypertension, oxaliplatin induced neuropathy, and has required chemotherapy dose modifications.  ?   For details, see my last note dated 02/11/2020.? Also refer to assessment and plan section.  ?  ?   Interval History?  ?   08/31/2020:  -Cycle #6 of Avastin/FOLFIRI 06/15/2020-06/17/2020  -Hospitalized [facility] 06/21/2020-06/23/2020: Positive blood culture.? Acute colitis.? Acquired neutropenia.? Immunosuppressed.? Chronic diarrhea secondary to chemotherapy.? Admitted with nausea, vomiting, diarrhea, and abdominal pain.? Hypokalemic.? ANC 0.32.  -Cycle #7 of Avastin/FOLFIRI 07/27/2020-07/29/2020  -Irinotecan dose decreased from 180 mg/m? to 144 mg/m? from sixth cycle of chemotherapy onwards (from 07/06/2020) onwards  -Eighth cycle of chemotherapy head on 08/10/2020 due to neutropenia (ANC 1.01)  -Eighth cycle of chemotherapy held on 08/17/2020 due to  neutropenia (ANC 0.91)  -08/26/2020: Restaging CT C/A/P with contrast (comparison: 06/21/2020): Interval increase in size of 2 metastatic peritoneal implants (1.8 cm, previously 1.6 cm; 1.7 cm, previously 1.46 cm); new mild right hydronephrosis, likely secondary to peritoneal metastatic disease, with decreased renal enhancement; minimal interval increase in size of a single pulmonary nodule with otherwise stable lung nodules; stable hepatic metastatic disease; mild diffuse bladder wall thickening; interval resolution of colonic wall thickening)  -CEA level rising (49.4 on 08/17/2020)  Presents for follow-up visit. ?Doing well.? No acute symptoms.? Does experience some nausea for a few days after chemotherapy?but that is self-limiting. ?Appetite is fair.? Denies abdominal pain. ?No undue weakness or fatigue. ?ECOG 1. ?No fevers or chills.? No GI bleeding.? No unusual headaches, focal neurological symptoms, chest pain, cough, dyspnea, etc.  ?  12/03/2020:  -Regorafenib started 09/28/2020  -Regorafenib dose decreased to 120 mg 10/08/2020 (hand-foot syndrome)  -11/30/2020: Restaging CT C/A/P with contrast (comparison: 08/26/2020): Interval progressive metastatic disease above and below the diaphragm (right lung nodules have slightly increased in size; left lung subpleural nodule enlarged; new metastasis in both liver lobes; right liver lobe metastasis enlarged; right peritoneal presumed metastasis persists, causing right hydronephrosis as well as asymmetrical decreased right renal function/enhancement; additional indistinct peritoneal implants in the anterior right upper quadrant have enlarged 5 cm x 27 mm)  -CEA level: 242.46 (11/18/2020), rising (was 154.74 on 10/20/2020; 113.8 on 10/08/2020; 75.9 on 09/24/2020, etc.).  -12/03/2020: CMP essentially unremarkable.? WBC 3.6.? Hemoglobin 11.0.? Platelets 127,000/mm?.? ANC 2.06.  Presents for follow-up visit. ?Doing well.? Hand-foot syndrome is subsiding.? He has been off  regorafenib for 2 weeks.? Has some blotchy erythematous areas?on the palms of both hands; no blisters and no bleeding.? Applying moisturizing lotions.? Says that symptoms are better.? Absolutely denies abdominal pain, nausea, vomiting, GI bleeding,?fevers, chills, jaundice,?anorexia,?chest pain, cough, dyspnea, unusual headaches,?focal neurological symptoms, etc.? We discussed?scans and labs which indicated worsening metastatic disease.  ?  ?  Review of Systems  A complete 12 point ROS was performed in full with pertinent positives as described in interval history. Remainder of ROS done in full and are negative.?  ?  ?  Physical Exam:  VITAL SIGNS: ?Reviewed. ? ?  GENERAL: ?In no apparent distress. ?  HEAD: ?No signs of head trauma.  EYES: ?Pupils are equal. ?Extraocular motions intact. ?  EARS: ?Hearing grossly intact.  MOUTH: ?Oropharynx is normal.?  NECK: ?No adenopathy, no JVD. ??  CHEST: ?Chest with clear breath sounds bilaterally. ?No wheezes, rales, or rhonchi. ?  CARDIAC: ?Regular rate and rhythm. ?S1 and S2, without murmurs, gallops, or rubs.  VASCULAR: ?No Edema. ?Peripheral pulses normal and equal in all extremities.  ABDOMEN: ?Soft, without detectable tenderness. ?No sign of distention. ?No ? rebound or guarding, and no masses palpated. ? Bowel Sounds normal.  MUSCULOSKELETAL: ?Good range of motion of all major joints. Extremities without clubbing, cyanosis or edema. ?  NEUROLOGIC EXAM: ?Alert and oriented x 3. ?No focal sensory or strength deficits. ? Speech normal. ?Follows commands.  PSYCHIATRIC: ?Mood normal.  SKIN: ?No rash or lesions.  12/03/2020:?Patchy erythematous areas of the palms of both hands,?residua of hand-foot syndrome?which has improved?with regorafenib on hold for 2 weeks; no blisters;?no fissures: No bleeding  ?  ?  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 ?Perforated cecal mass with peritonitis. ?All margins negative. ?Perineural invasion positive. ?13 out of 21 lymph nodes positive. ?Omentum positive for metastatic adenocarcinoma.  -CEA level 30.4 on 06/01/2018 (1 day postop)  -MMR expression preserved. KRAS positive, NRAS negative, BRAF negative.  -NTRK gene fusion negative  ?-7/2/18: CT chest: No evidence of metastatic disease in the chest  ?-FOLFOX started 07/10/2018  ?-5-FU bolus discontinued after first cycle of FOLFOX because of neutropenia  ?-Avastin added to FOLFOX from third cycle onwards (allowing at least 8 weeks of postoperative recovery)  -New liver metastasis, improved mesenteric lymph nodes?(CTs: 09/11/2018); CEA dropping  -->?No change in treatment  -Colonoscopy (09/27/2018)?(because preoperative colonoscopy was not feasible):?Tubulovillous adenoma?in transverse colon, 3 cm, on a stalk  (Repeat colonoscopy in 1 year)  -Positive response to treatment?(CTs: 12/05/2018)?(post 9 cycles of chemotherapy)  -Continued positive response?(CTs: 03/11/2019)?(post 15 cycles of Avastin/FOLFOX)  -Stable?(CTs: 06/24/2019)?(post 23 cycles of Avastin/FOLFOX)  -Stable?(CT scan 08/2019)?(post?30 cycles of Avastin/FOLFOX)  -Surveillance colonoscopy (11/14/2019): Normal colonoscopy?(repeat in 3 years)  -Disease progression (CTs: 02/03/2020); same treatment continued  ?  >>>  Progression?on Avastin?+?FOLFOX:  -Disease progression post?40 cycles of Avastin/FOLFOX?(CTs:?04/09/2020)  -Avastin/FOLFIRI started 04/13/2020  -Restaging CTs?post 4 cycles (06/12/2020):?1?new mesenteric soft tissue nodule?(1 cm);?rest, stable  -Status post a bout of?diarrhea induced DACIA  -Cycle #6 of Avastin/FOLFIRI 06/15/2020-06/17/2020  -Hospitalized [facility] 06/21/2020-06/23/2020: Positive blood culture. ?Acute colitis.  ?Acquired neutropenia. ?Immunosuppressed. ?Chronic diarrhea secondary to chemotherapy. ?Admitted with nausea, vomiting, diarrhea, and abdominal pain. ?Hypokalemic. ?ANC 0.32.  -Cycle #7 of Avastin/FOLFIRI 07/27/2020-07/29/2020  -Irinotecan dose decreased from 180 mg/m??to 144 mg/m??from sixth cycle of chemotherapy onwards (from 07/06/2020) onwards  -Eighth cycle of chemotherapy head on 08/10/2020 due to neutropenia (ANC 1.01)  -Eighth cycle of chemotherapy held on 08/17/2020 due to neutropenia (ANC 0.91)  -08/26/2020: Restaging CT C/A/P with contrast (comparison: 06/21/2020): Interval increase in size of 2 metastatic peritoneal implants (1.8 cm, previously 1.6 cm; 1.7 cm, previously 1.46 cm); new mild right hydronephrosis, likely secondary to peritoneal metastatic disease, with decreased renal enhancement; minimal interval increase in size of a single pulmonary nodule with otherwise stable lung nodules; stable hepatic metastatic disease; mild diffuse bladder wall thickening; interval resolution of colonic wall thickening)  -CEA level rising (49.4 on 08/17/2020)  -->  Severe neutropenia post cycle #6, requiring hospitalization  Irinotecan dose decreased from seventh cycle of chemotherapy onwards  To recall,?5-FU bolus discontinued and 5-FU infusion?decreased by 20%?many months back?  At least 3 weeks?delay?starting eighth cycle of chemotherapy due to persistent neutropenia  Disease progression?on CTs (08/26/2020)  Avastin/FOLFIRI?stopped after 7 cycles?because of intolerance?(cycle #7 completed 07/29/2020)  ?  >>>  Avastin/FOLFIRI stopped after 7 cycles because of?intolerance  (Severe,?persistent neutropenia despite dose modifications  -Regorafenib started 09/28/2020  -Regorafenib dose decreased to 120 mg?(10/08/2020) secondary to hand-foot syndrome  -Disease progression on restaging CTs (11/30/2020)  -As of 11/18/2020, CEA level 242.46, rising  ?  >>>  Disease progression on?regorafenib:  -Disease progression on  restaging CTs (11/30/2020)  -As of 11/18/2020,?CEA level 242.46, rising?  ?  ?  #Sites of disease:  Cecum?(status post hemicolectomy);?omentum; liver; mesenteric lymph nodes  ?  ?  #Molecular markers:  KRAS mutation positive.? NRAS mutation negative.? BRAF mutation negative.  MMR expression preserved  NTRK gene fusion negative  ?  ?  2.?Chemotherapy dose modifications:  -5-FU bolus discontinued after first cycle of FOLFOX?secondary to neutropenia  -5-FU infusion dose decreased by 20% from 10th cycle of chemotherapy onwards (from 12/17/2018 onwards)  -Oxaliplatin dose reduced from 85 mg/m??to 65 mg/m??from 35th cycle of chemotherapy onwards (from 01/15/2020 onwards)  ?  ????????  ?3. Iron Deficiency Anemia (labs 08/16/2018, ferritin 5.2)  ?-Received Feraheme 510 mg??2 doses, on 08/24/2018 and 08/31/2018, respectively.  -05/06/2019: Iron stores normal.? Ferritin 64.4.? Transferrin saturation 18.6%.? Iron and TIBC normal.  ????????  ?4. Hypertension,?controlled?with compliance  -On?lisinopril 20mg/ [id number] po daily  ?  5. Oxaliplatin induced neuropathy  Cymbalta started 02/21/2019  ?  ?  Plan:  -Failed Avastin/FOLFOX  -Avastin/FOLFIRI stopped after 7 cycles because of severe, prolonged cytopenias?despite dose modifications, subsequently with disease progression  -Has failed regorafenib  ?  Discontinue regorafenib  ?  Hand-foot syndrome symptoms are improving?with regorafenib on hold for 2 weeks  -Continue to apply moisturizing lotions  ?  -Only option left: Trifluridine/tipiracil  We discussed this in detail.  Discussed potential side effects?of?trifluridine/tipiracil.  Gave him educational materials from [employer].  Clearly discussed?that apart from trifluridine/tipiracil, there is no?other standard?treatment option at this time.  Made him aware of the option of further?oncology opinions if he so desires.  He will let us know by tomorrow if he wishes to start trifluridine/tipiracil.  ?  -Prognosis extremely guarded  at this point  ?  -Surveillance colonoscopy November 22  ?  Follow-up in 2 weeks, with CBC and CMP  ?  Above discussed with him.? All questions answered.?  His wife was?also present during the conversation?via video?on?his phone.  He understands and agrees with this plan.  ------------  ?  ?  Discussion:  Since preoperative colonoscopy was not feasible, therefore,?surveillance colonoscopy was performed 09/27/2018,?revealing a tubulovillous adenoma?in transverse colon.  1-year?surveillance colonoscopy (11/14/2019) was normal  Next surveillance colonoscopy will be due in 3 years (11/2022)  ?  -KRAS mutation positive; therefore,?not a candidate for treatment with cetuximab or Panitumumab.  -MMR expression preserved; therefore,?not a candidate for treatment with?nivolumab or pembrolizumab.  -NTRK gene fusion negative; therefore,?not a candidate for treatment with larotrectinib or entrectinib  ?  ?  Trifluridine/tipiracil:  Dose:  Round each dose to nearest 5 mg increments?  Obtain blood counts prior to starting each cycle and on day 15 of each cycle.  Do not initiate a cycle until ANC >1500/mm? or febrile neutropenia has resolved, platelets are >75,000/mm?, and/or grade 3 or 4 nonhematologic reactions are <grade 1  Moderate, differential; antiemetics are recommended to prevent nausea and vomiting.  ?  Colorectal cancer, metastatic: Oral: 35 mg/m? (based on trifluridine component) twice daily on days 1-5 and days 8-12 offered 28-day cycle (maximum per dose: Trifluridine 80 mg); continue until disease progression or unacceptable toxicity.  ?  Dose adjusted according to renal function.  Dose adjust according to hepatic function.  ?  Dose adjustments for toxicity:  Hematologic  Nonhematologic  ?  Oral: Administer with food; school attended school  ?  Warnings/precautions:  1.? Bone marrow suppression: Severe and life-threatening (grade 3 or 4) bone marrow suppression.? Monitor blood counts prior to the start of each cycle  as well as on day 15, or more frequently if clinically necessary.? May require therapy intervention and/or close reduction  2.? Gastrointestinal toxicity: Moderate emetic potential.? Nausea, vomiting, diarrhea, abdominal pain.? Stomatitis.  ?  Adverse reactions:  >10%:  Fatigue, nausea, anorexia, diarrhea, vomiting, abdominal pain, bone marrow suppression, asthenia, fever, etc.  Physical Exam  Vitals & Measurements  T:?37.0? ?C (Oral)? HR:?99(Peripheral)? BP:?156/105? SpO2:?100%?  HT:?182.00?cm? WT:?99.600?kg? BMI:?30.07?   Problem List/Past Medical History  Ongoing  Adenocarcinoma of cecum  CA - Cancer of colon  Chemotherapy-induced peripheral neuropathy  HTN (hypertension)  Hyperglobulinemia  Iron deficiency anemia  Liver metastases  Lung metastases  Morbid obesity  Peritoneal carcinomatosis  Historical  No qualifying data  Procedure/Surgical History  Colonoscopy (None) (11/14/2019)  Colonoscopy, flexible; diagnostic, including collection of specimen(s) by brushing or washing, when performed (separate procedure) (11/14/2019)  Inspection of Lower Intestinal Tract, Via Natural or Artificial Opening Endoscopic (11/14/2019)  Colon Tattoo (None) (09/27/2018)  Colonoscopy (None) (09/27/2018)  Colonoscopy, flexible; with directed submucosal injection(s), any substance (09/27/2018)  Colonoscopy, flexible; with removal of tumor(s), polyp(s), or other lesion(s) by snare technique (09/27/2018)  Excision of Transverse Colon, Via Natural or Artificial Opening Endoscopic, Diagnostic (09/27/2018)  Introduction of Other Therapeutic Substance into Lower GI, Via Natural or Artificial Opening Endoscopic (09/27/2018)  Polypectomy (None) (09/27/2018)  Catheter Insertion Mediport (None) (06/27/2018)  Fluoroscopy of Superior Vena Cava using Low Osmolar Contrast, Guidance (06/27/2018)  Insertion of Infusion Device into Superior Vena Cava, Percutaneous Approach (06/27/2018)  Insertion of tunneled centrally inserted central venous access  device, with subcutaneous port; age 5 years or older (06/27/2018)  Appendectomy Laparoscopic (None) (05/31/2018)  Inspection of Lower Intestinal Tract, Percutaneous Endoscopic Approach (05/31/2018)  Resection of Appendix, Open Approach (05/31/2018)  Resection of Right Large Intestine, Open Approach (05/31/2018)  colon resection   Medications  Avastin (for IVPB), 715 mg, 5 mg/kg, IV, Once-chemo  dexamethasone (for IVPB)  dexamethasone (for IVPB)  dexamethasone (for IVPB)  dexamethasone (for IVPB)  dexamethasone (for IVPB)  fluorouracil (for IVPB) -CCA  Heparin Flush 100 U/mL - 5 mL, 500 units= 5 mL, IV Push, Once-chemo  hydrALAZINE, 5 mg= 0.25 mL, IV Push, Once  hydrochlorothiazide-lisinopril 12.5 mg-20 mg oral tablet, 2 tab(s), Oral, Daily, 3 refills  levothyroxine 112 mcg (0.112 mg) oral tablet, 112 mcg= 1 tab(s), Oral, Daily, 2 refills  megestrol 40 mg/mL oral suspension, 800 mg= 20 mL, Oral, Daily, 1 refills  meperidine 25 mg/mL injectable solution, 25 mg= 1 mL, IM, Once  ondansetron 8 mg oral tablet, 8 mg= 1 tab(s), Oral, TID  Phenergan, 12.5 mg= 0.5 mL, IM, Once  potassium chloride 20 mEq oral TABLET extended release, 20 mEq= 1 tab(s), Oral, Daily, 3 refills  Zofran (for IVPB) 16 mg + dexamethasone 10 mg/mL injectable solution 10 mg  Zofran (for IVPB) 16 mg + dexamethasone 10 mg/mL injectable solution 10 mg  Allergies  Norvasc?(Headache)  dilTIAZem?(Headache)  Social History  Abuse/Neglect  No, No, Yes, 11/19/2020  Alcohol  Never, 11/19/2020  Employment/School  Employed, 06/12/2018  Exercise  Exercise type: Walking., 10/31/2019  Home/Environment  Lives with Children, Spouse., 06/12/2018  Nutrition/Health  Regular, 06/12/2018  Sexual  Sexual orientation: Straight or heterosexual. Gender Identity Identifies as male., 01/10/2019  Spiritual/Cultural  Islam, 10/31/2019  Substance Use  Never, 11/19/2020  Tobacco  Never (less than 100 in lifetime), No, 11/19/2020  Family History  Alcoholism.: Negative:  Father.  Heart failure.: Brother.  Primary malignant neoplasm of prostate: Father.  Renal failure syndrome.: Mother.  Immunizations  Vaccine Date Status   influenza virus vaccine, inactivated 11/19/2019 Given   influenza virus vaccine, inactivated 12/17/2018 Given

## 2022-05-04 NOTE — HISTORICAL [facility]ER
This is a historical note converted from [employer]. Formatting and pictures may have been removed.  Please reference [employer] for original formatting and attached multimedia. Chief Complaint  c/o abdominal cramping with vomiting since last pm. has colon ca. taking chemo at present. stated took a home remedy otc for nausea. after that he started with pain  Reason for Consultation  Positive blood culture  History of Present Illness  Mr Ceballos is a 39 yr old BM admitted for c/o abdominal cramps and N/V/D.? Pt has a history of adenocarcinoma of the cecum and is currently on chemo.? Last chemo was on 6/15/20.? Pt states usually gets the diarrhea and N/V, but this time it was different d/t severe abdominal cramps.? Denies fever, chills, night sweats, etc.? Upon lab imaging, pt was found to have enterocolitis.? Labs showed pt with neutropenia.? He is in reverse isolation.? One of blood cultures was positive for bacillus species in 1 of 2 aerobic bottles.? ID has been consulted for this.? 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  Review of Systems  Constitutional: no fever, fatigue, weakness  Eye: no vision loss, eye redness, drainage, or pain, no yellowing of the eyes  ENMT: no sore throat, ear pain, sinus pain/congestion, nasal congestion/drainage  Respiratory: no cough, no wheezing, no shortness of breath  Cardiovascular: no chest pain, no palpitations, no edema  Gastrointestinal: no nausea or?vomiting,?+ diarrhea.?Previous abdominal pain, no avis colored stools  Genitourinary: no dysuria, no urinary frequency or urgency, no hematuria  Hema/Lymph: no abnormal bruising or bleeding  Endocrine: no heat or cold intolerance, no excessive thirst or excessive urination  Musculoskeletal: no muscle or joint pain, no joint swelling  Integumentary: no skin rash or abnormal  lesion, no jaundice  Neurologic: no headache, no dizziness, no numbness?  Physical Exam  Vitals & Measurements  T:?36.1? ?C (Oral)? TMIN:?36.1? ?C (Oral)? TMAX:?37.1? ?C (Oral)? HR:?97(Peripheral)? RR:?22? BP:?120/93? SpO2:?100%?  General:?Well-developed well-nourished BM?in no acute distress  Eye: PERRLA, EOMI, clear conjunctiva, eyelids normal  HENT:?oropharynx without erythema/exudate, oropharynx and nasal mucosal surfaces moist, no thrush  Neck: full range of motion, no thyromegaly or lymphadenopathy  Respiratory:?Breathing unlabored. Lungs clear to auscultation bilaterally  Cardiovascular:?regular rate and rhythm without murmurs, gallops or rubs  Gastrointestinal:?soft, non-tender, non-distended with normal bowel sounds, without masses to palpation  Genitourinary: no CVA tenderness to palpation  Musculoskeletal:?full range of motion of all extremities/spine without limitation or discomfort  Integumentary: no rashes or skin lesions present  Neurologic: cranial nerves grossly intact  Assessment/Plan  1.?Positive blood culture?R78.81  2.?Acute colitis?K52.9  3.?Acquired neutropenia?D70.9  4.?Immunosuppression?D89.9  ?  6/21/20 Blood culture with Bacillus species?(1 of 2 aerobic bottles) - This organism is commonly considered a contaminant. ?No further processing will be done.  ?  ?  At this juncture, pt looks really good.? He has no complaints and is comfortable.? He has been afebrile throughout hospitalization.? No leukocytosis.? He is leukopenic / neutropenic.? Bacillus species grew in 1 of 2 aerobic bottles.? At this point, feel this is a contaminate.? Thank you for the consult.? Please call for any questions.? Will sign off.   Problem List/Past Medical History  Ongoing  Adenocarcinoma of cecum  CA - Cancer of colon  Chemotherapy-induced peripheral neuropathy  HTN (hypertension)  Hyperglobulinemia  Iron deficiency anemia  Liver metastases  Lung metastases  Morbid obesity  Peritoneal  carcinomatosis  Historical  No qualifying data  Procedure/Surgical History  Colonoscopy (None) (11/14/2019)  Colonoscopy, flexible; diagnostic, including collection of specimen(s) by brushing or washing, when performed (separate procedure) (11/14/2019)  Inspection of Lower Intestinal Tract, Via Natural or Artificial Opening Endoscopic (11/14/2019)  Colon Tattoo (None) (09/27/2018)  Colonoscopy (None) (09/27/2018)  Colonoscopy, flexible; with directed submucosal injection(s), any substance (09/27/2018)  Colonoscopy, flexible; with removal of tumor(s), polyp(s), or other lesion(s) by snare technique (09/27/2018)  Excision of Transverse Colon, Via Natural or Artificial Opening Endoscopic, Diagnostic (09/27/2018)  Introduction of Other Therapeutic Substance into Lower GI, Via Natural or Artificial Opening Endoscopic (09/27/2018)  Polypectomy (None) (09/27/2018)  Catheter Insertion Mediport (None) (06/27/2018)  Fluoroscopy of Superior Vena Cava using Low Osmolar Contrast, Guidance (06/27/2018)  Insertion of Infusion Device into Superior Vena Cava, Percutaneous Approach (06/27/2018)  Insertion of tunneled centrally inserted central venous access device, with subcutaneous port; age 5 years or older (06/27/2018)  Appendectomy Laparoscopic (None) (05/31/2018)  Inspection of Lower Intestinal Tract, Percutaneous Endoscopic Approach (05/31/2018)  Resection of Appendix, Open Approach (05/31/2018)  Resection of Right Large Intestine, Open Approach (05/31/2018)  colon resection   Medications  Inpatient  Avastin (for IVPB), 715 mg, 5 mg/kg, IV, Once-chemo  Compazine, 5 mg= 1 mL, IV Push, q4hr, PRN  dexamethasone (for IVPB)  dexamethasone (for IVPB)  dexamethasone (for IVPB)  dexamethasone (for IVPB)  dexamethasone (for IVPB)  fluorouracil (for IVPB) -CCA  Heparin Flush 100 U/mL - 5 mL, 500 units= 5 mL, IV Push, Once-chemo  hydrALAZINE, 5 mg= 0.25 mL, IV Push, Once  Lactated Ringers Infusion 1,000 mL, 1000 mL, IV  Lovenox, 40 mg=  0.4 mL, Subcutaneous, Daily  Megace, 800 mg= 20 mL, Oral, Daily  meperidine 25 mg/mL injectable solution, 25 mg= 1 mL, IM, Once  meropenem (for IVPB)  Phenergan, 12.5 mg= 0.5 mL, IM, Once  vancomycin  Zofran (for IVPB) 16 mg + dexamethasone 10 mg/mL injectable solution 10 mg  Zofran (for IVPB) 16 mg + dexamethasone 10 mg/mL injectable solution 10 mg  Home  hydrochlorothiazide-lisinopril 12.5 mg-20 mg oral tablet, 2 tab(s), Oral, Daily  levothyroxine 75 mcg (0.075 mg) oral tablet, 75 mcg= 1 tab(s), Oral, Daily, 3 refills  megestrol 40 mg/mL oral suspension, 800 mg= 20 mL, Oral, Daily, 1 refills  potassium chloride 10 mEq oral CAPSULE extended release, 10 mEq= 1 cap(s), Oral, Daily  potassium chloride 20 mEq oral TABLET extended release, 20 mEq= 1 tab(s), Oral, BID  Allergies  Norvasc?(Headache)  dilTIAZem?(Headache)  Social History  Abuse/Neglect  No, 06/22/2020  No, No, Yes, 06/21/2020  No, 06/17/2020  No, No, Yes, 06/15/2020  Alcohol  Never, 05/30/2018  Employment/School  Employed, 06/12/2018  Exercise  Exercise type: Walking., 10/31/2019  Home/Environment  Lives with Children, Spouse., 06/12/2018  Nutrition/Health  Regular, 06/12/2018  Sexual  Sexual orientation: Straight or heterosexual. Gender Identity Identifies as male., 01/10/2019  Spiritual/Cultural  Hoahaoism, 10/31/2019  Substance Use  Never, 11/12/2018  Tobacco  Never (less than 100 in lifetime), N/A, 06/22/2020  Never (less than 100 in lifetime), No, 06/21/2020  Never (less than 100 in lifetime), N/A, 06/17/2020  Never (less than 100 in lifetime), N/A, Household tobacco concerns: No. Smokeless Tobacco Use: Never., 06/15/2020  Family History  Alcoholism.: Negative: Father.  Heart failure.: Brother.  Primary malignant neoplasm of prostate: Father.  Renal failure syndrome.: Mother.  Immunizations  Vaccine Date Status   influenza virus vaccine, inactivated 11/19/2019 Given   influenza virus vaccine, inactivated 12/17/2018 Given   Lab Results  Test Name Test  Result Date/Time Comments   Sodium Lvl 134 mmol/L (Low) 06/23/2020 10:20 CDT    Potassium Lvl 3.4 mmol/L (Low) 06/23/2020 10:20 CDT    Chloride 104 mmol/L 06/23/2020 10:20 CDT    CO2 26 mmol/L 06/23/2020 10:20 CDT    Calcium Lvl 8.2 mg/dL (Low) 06/23/2020 10:20 CDT    Magnesium Lvl 2.6 mg/dL 06/23/2020 10:20 CDT    Glucose Lvl 94 mg/dL 06/23/2020 10:20 CDT    BUN 5 mg/dL (Low) 06/23/2020 10:20 CDT    Creatinine 1.00 mg/dL 06/23/2020 10:20 CDT    eGFR-AA >105 mL/min 06/23/2020 10:20 CDT    Bili Total 0.6 mg/dL 06/23/2020 01:55 CDT    AST 85 unit/L (High) 06/23/2020 01:55 CDT     unit/L (High) 06/23/2020 01:55 CDT    Alk Phos 98 unit/L 06/23/2020 01:55 CDT    Albumin Lvl 2.3 gm/dL (Low) 06/23/2020 01:55 CDT    WBC 1.8 x10(3)/mcL (Low) 06/23/2020 01:55 CDT    Hgb 9.3 gm/dL (Low) 06/23/2020 01:55 CDT    Hct 28.3 % (Low) 06/23/2020 01:55 CDT    Platelet 118 x10(3)/mcL (Low) 06/23/2020 01:55 CDT    Vanc Lvl Random 18.4 mcg/mL 06/23/2020 01:55 CDT Vancomycin, Trough  ?Therapeutic: 10.0 - 20.0 mcg/mL  ?Toxic: >20 mcg/mL  ?  Vancomycin, Peak  ?Therapeutic: 20.0 - 40.0 mcg/mL  ?Toxic: >80.0 mcg/mL   HIV Nonreactive 06/22/2020 08:01 CDT    Hep A IgM Nonreactive 06/22/2020 08:01 CDT    Hep B Core IgM Nonreactive 06/22/2020 08:01 CDT    Hep Bs Ag Nonreactive 06/22/2020 08:01 CDT    Hep C Ab Nonreactive 06/22/2020 08:01 CDT Interpretive Information (HCV):  ? ? ? ? ? ? ? ?Nonreactive: ? ?Antibodies to HCV not detected.  ? ? ? ? ? ? ? ?Reactive: ? ? ? ?A reactive result should be followed by nucleic acid testing for  ? ? ? ? ? ? ? ? ?HCV RNA for identifying current hepatitis C virus(HCV) infection.  ? ? ? ? ? ? ? ?Equivocal: ? ? ?Another specimen should be obtained from patient for further testing.   COVID-19 Rapid NEGATIVE 06/21/2020 19:15 CDT    Diagnostic Results  (06/21/2020 18:38 CDT US Abdomen Limited)  Clinical History  Epigastric pain.  ?  Technique  Limited abdominal Ultrasound Images obtained in grayscale and  color.  ?  Comparison  CT of the abdomen and pelvis 6/12/2020.  ?  Findings  The LIVER is normal in size and contour at 14.6 cm (sagittal right  lobe). Hepatic parenchyma has normal echogenicity with normal  delineation of the periportal triads. No definite intrahepatic masses  are noted. The portal vein is patent with hepatopetal flow. There is a  hypoechoic well-circumscribed lesion in the right upper quadrant  measuring 6.4 x 8.9 x 5.3 cm with low-level internal echoes.  ?  The BILIARY SYSTEM is normal in caliber; the common hepatic duct  measures 3 mm. There are no ?stones seen in the GALL BLADDER. There is  no evidence of acute cholecystitis.?  ?  The PANCREATIC head and body are partially visualized but grossly  normal in appearance. The pancreatic duct is not dilated.  ?  The RIGHT KIDNEY is normal in size at 9.5 x 6.0 x 5.2 cm and  echogenicity/texture. No stones or hydronephrosis seen. No solid  masses are noted.?  ?  The AORTA and IVC are partially visualized and unremarkable.  ?  Impression  Well-circumscribed hypoechoic lesion in the right upper quadrant  measuring up to 8.9 cm with low-level internal echoes. An abscess  cannot be excluded. Recommend CT of the abdomen and pelvis with IV  contrast.  ?   (06/21/2020 21:11 CDT CT Abdomen and Pelvis W Contrast)  CT abdomen pelvis with contrast  ?  Technique: Images of the abdomen and pelvis were obtained with  contrast.  ?  Total DLP: 1536.88 mGy cm?  ?  Indication: Abdominal pain.  ?  Comparison: CT of the abdomen and pelvis 6/12/2020.  ?  Findings:?  There is an unchanged 6 nodule in the medial basilar segment of the  right lower lobe.. The heart is normal in size.  ?  The hypodense nodule in the posterior right hepatic lobe is unchanged.  The portal vein is patent. The gallbladder, spleen, pancreas, and  adrenal glands are normal. There is a simple cyst in the left kidney.  The right kidney is normal. There is no hydronephrosis  or  nephrolithiasis.  ?  There are postsurgical changes in the terminal ileum. There is  circumferential wall thickening, hyperemia, and surrounding  inflammatory stranding involving the distal ileum just proximal to the  anastomosis. There is no extraluminal air. There is no abscess. This  was not present on the prior exam. There is mild wall thickening and  apparent stricturing in the rectosigmoid. There is no pelvic or  retroperitoneal adenopathy. The aorta is nonaneurysmal. There is no  lytic or blastic osseous lesion. The mesenteric soft tissue nodule  just right of midline best visualized on series 2 image 43 is  unchanged. The midline epigastric nodule is unchanged and best  visualized on image 36.  ?  Impression:  ?  1. Circumferential wall thickening, hyperemia, and surrounding  inflammatory stranding involving the distal ileum just proximal to the  ileocolonic anastomosis. Additionally there is minimal stricturing in  the rectosigmoid. These findings suggest an inflammatory colitis such  as Crohns.  2. Unchanged hepatic, pulmonary, and mesenteric metastatic disease.     [1]?CT Abdomen and Pelvis W Contrast; Manuelito RICARDO, [facility] N[facility]I 06/21/2020 21:11 CDT   Patient seen and examined with NP. ?Agree with documented physical exam. ?Treatment plan reviewed and discussed with nurse practitioner and is reasonable and appropriate. ?  ?  ?

## 2022-05-04 NOTE — HISTORICAL [facility] CERNER
This is a historical note converted from [place]. Formatting and pictures may have been removed.  Please reference [place] for original formatting and attached multimedia. Chief Complaint  metastatic colon cancer  History of Present Illness  Problem list:  1. 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 ?Perforated cecal mass with peritonitis. ?All margins negative. ?Perineural invasion positive. ?13 out of 21 lymph nodes positive. ?Omentum positive for metastatic adenocarcinoma.  ?   2. Hyperglobulinemia.  ?????  Current Treatment:  Palliative FOLFIRI + Avastin every 2 weeks until disease progression or unacceptable toxicity  -Started: 04/13/2020  ?   C#3 due: 5/12/2020  ?   Dose Modifications:  -5FU bolus d/grant, irinotecan and infusional 5FU dose reduced by 20%  ?   Treatment History:  1. S/p right hemicolectomy with ileocolonic anastomosis and partial omentectomy 05/31/2018  2. Palliative FOLFOX+ Avastin x40 cycles: 06/2018-03/25/2020, stopped d/t disease progression  ?   History of present illness:  37-year-old gentleman. ?On 5/31/18, presented to the hospital with 2 days of worsening diffuse abdominal pain associated with anorexia, nausea, and vomiting. ?Initially, the pain was intermittent but subsequently became constant and worsened in severity. ?It progressed to the point that any sort of movement caused him sharp abdominal pain. ?Denied fevers, chills, chest pains, dyspnea, hematochezia, hematemesis, or him melena. ?No such prior episodes. ?On examination, abdomen was diffusely tender to palpation but without any peritoneal signs; positive Rovsing sign; negative obturator sign and negative psoas sign.  ?  5/31/18:?CT A/P showed combination of grossly dilated and fluid-filled appendix and surrounding inflammatory changes  consistent with acute appendicitis; wall thickening also identified in the cecum near the base of the appendix most likely representing reactive changes although an underlying mass could not be excluded; the grossly distended and fluid-filled appendix measured up to 1.9 cm; a few locally prominent mesenteric lymph nodes also visible in the right lower quadrant, thought to be reactive.  ?  5/31/18:?Underwent right hemicolectomy with ileocolonic anastomosis and partial omentectomy. ?Perforated cecal mass with peritonitis was noted. ?Laparoscopic appendectomy was converted to open right hemicolectomy. Tumor involved cecum, ileocecal valve, extending into proximal appendix. ?Tumor size 7.2 cm in greatest dimension. ?No macroscopic tumor perforation. ?Moderately differentiated, grade 2. ?Tumor invades the visceral peritoneum (serosal surface). ?All margins negative for invasive carcinoma, high-grade dysplasia, or intramucosal adenocarcinoma. ?Lymphovascular invasion present. ?Perineural invasion not identified. ?Number of tumor deposits 5. ?Largest tumor deposit 1.1 cm. ?Largest lymph node metastasis 1 cm. ?Extracapsular lymph node extension of tumor present. Omentum, segmental resection, positive for metastatic adenocarcinoma; terminal ileum, ascending colon, segmental resection specimen positive for moderately differentiated adenocarcinoma. 13 out of 21 lymph nodes involved.  ?  MMR expression preserved. KRAS positive, NRAS negative, BRAF negative. 7/2/18: CT chest no evidence of metastatic disease in the chest. ?Baseline CEA 30.1?  ?   Interval History  5/12/2020: Patient presents for follow up on FOLFIRI/Avastin; he is scheduled to receive cycle #3 today. He reports having experienced significant diarrhea after his last chemo; he reports 6-7 episodes/day. He did not take Imodium or any OTC antidiarrheal. He reports that every time he drank water, he experienced diarrhea, so he stopped drinking water and only drank  Ensure. He states with this medication, everything tastes spicy so he stopped eating most foods and only eats fruits. Discussed the importance of a well-balanced diet. Discussed eating broiling/baking meats and eating vegetables without seasoning. He experienced syncope over the weekend secondary to dehydration; his wife took him to the ER where he was diagnosed and treated for dehydration and DACIA.?He was discharged yesterday.?Today, Na low at 134; K + low at 3.4. BUN WNL at 15; creatinine elevated but improved at 1.60. eGFR improved to 63 which is normal for him. Alk Phos slightly elevated at 123. CEA improved to 27.2. Urine protein 11.8.; will hold Avastin for 2+ protein in urine and order 24hr urine protein. WBC 5.3; H&H 10.5 & 31.8; plts 196k; ANC 3080.  Review of Systems  A complete 12-point?ROS was performed in full with pertinent positives as described in interval history. Remainder of ROS done in full and are negative.  Physical Exam  Vitals & Measurements  T:?37.1? ?C (Oral)? HR:?98(Peripheral)? RR:?20? BP:?116/82?  HT:?182?cm? WT:?121.3?kg? BMI:?36.62?  General: ?Alert and oriented, No acute distress.??  Appearance: Well-groomed  HEENT: ?Normocephalic, Oral mucosa is moist.?Pupils are equal, round and reactive to light, Extraocular movements are intact, Normal conjunctiva.?  Neck: ?Supple, Non-tender, No lymphadenopathy, No thyromegaly.??  Respiratory: ?Lungs are clear to auscultation, Respirations are non-labored, Breath sounds are equal, Symmetrical chest wall expansion.??  Cardiovascular: ?Normal rate, Regular rhythm, No edema.??  Breast:??Breast exam not performed on todays visit.??  Gastrointestinal: Rounded,?Soft, Non-tender, Non-distended, Normal bowel sounds.??  Musculoskeletal: ?Normal strength.??  Integumentary: ?Warm, dry, intact.??Tattoos to inner aspect of bilateral forearms.  Neurologic: ?Alert, Oriented, No focal deficits, Cranial Nerves II-XII are grossly intact.??  Cognition and Speech:  ?Oriented, Speech clear and coherent.??  Psychiatric: ?Cooperative, Appropriate mood & affect. ?  ECOG Performance Scale: 2 - Capable of all self-care but unable to carry out any work activities. Up and about greater than 50 percent of waking hours.?  Assessment/Plan  1.?Adenocarcinoma of cecum?C18.0  Assessment:  1.?Adenocarcinoma of cecum?C18.0  1.? 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 ?Perforated cecal mass with peritonitis. ?All margins negative. ?Perineural invasion positive. ?13 out of 21 lymph nodes positive. ?Omentum positive for metastatic adenocarcinoma.  -CEA level 30.4 on 06/01/2018 (1 day postop)  -MMR expression preserved. ?KRAS positive, NRAS negative, BRAF negative.  -NTRK gene fusion negative  -7/2/18: CT chest: No evidence of metastatic disease in the chest  -FOLFOX started 07/10/2018  -5-FU bolus discontinued after first cycle of FOLFOX because of neutropenia  -Avastin added to FOLFOX from third cycle onwards (allowing at least 8 weeks of postoperative recovery)  -New liver metastasis, improved mesenteric lymph nodes?(CTs: 09/11/2018); CEA dropping  -->?No change in treatment  -Colonoscopy (09/27/2018)?(because preoperative colonoscopy was not feasible):?Tubulovillous adenoma?in transverse colon, 3 cm, on a stalk  -Positive response to treatment?(CTs: 12/05/2018)?(post 9 cycles of chemotherapy)  -Continued positive response?(CTs: 03/11/2019)?(post 15 cycles of Avastin/FOLFOX)  -Stable?(CTs: 06/24/2019)?(post 23 cycles of Avastin/FOLFOX)  -Stable?(CT scan 08/2019)?(post?30 cycles of Avastin/FOLFOX)  -Surveillance colonoscopy (11/14/2019): Normal colonoscopy  -Disease progression (CTs: 02/03/2020)  -Disease  progression post?40 cycles of Avastin/FOLFOX?(CTs:?04/09/2020)  ?  ?   #Sites of disease:  Cecum?(status post hemicolectomy);?omentum; liver; mesenteric lymph nodes  ?  ?   2.?Chemotherapy dose modifications:  -5-FU bolus discontinued after first cycle of FOLFOX?secondary to neutropenia  -5-FU infusion dose decreased by 20% from 10th cycle of chemotherapy onwards (from 12/17/2018 onwards)  -Oxaliplatin dose reduced from 85 mg/m??to 65 mg/m??from 35th cycle of chemotherapy onwards (from 01/15/2020 onwards)  ?   ?????????  ?3. Iron Deficiency Anemia (labs 08/16/2018, ferritin 5.2)  ?-Received Feraheme 510 mg??2 doses, on 08/24/2018 and 08/31/2018, respectively.  -05/06/2019: Iron stores normal.? Ferritin 64.4.? Transferrin saturation 18.6%.? Iron and TIBC normal.  ?????????  ?4. Hypertension,?controlled?with compliance  -On?lisinopril 20mg/ [id number] po daily  ?   ?????????????????  Plan:  Since preoperative colonoscopy was not feasible, therefore,?surveillance colonoscopy was performed 09/27/2018,?revealing a tubulovillous adenoma?in transverse colon.  1-year?surveillance colonoscopy (11/14/2019) was normal  Next surveillance colonoscopy will be due in 3 years (11/2022)  ?   Some progression of metastatic disease is noted on restaging scans (02/03/2020)  Progression of disease noted?post 40 cycles of chemotherapy?(CTs:?04/09/2020)  -->  Discontinue Avastin/FOLFOX  Switch to Avastin/FOLFIRI?(no 5-FU bolus;?5-FU infusion at 20%?dose reduced level)  ?  Restage with contrast-enhanced CT scans of chest, abdomen, and pelvis?after 4 cycles of Avastin/FOLFIRI?(in 2 months)  ?  -KRAS mutation positive; therefore,?not a candidate for treatment with cetuximab or Panitumumab.  -MMR expression preserved; therefore,?not a candidate for treatment with?nivolumab or pembrolizumab.  -NTRK gene fusion negative; therefore,?not a candidate for treatment with larotrectinib or entrectinib  ?  Continue to monitor CBC and CMP a couple of days  before each cycle of chemotherapy,?to monitor for toxicity  ?  Check urine dipstick?protein before each cycle of?Avastin based chemotherapy, to monitor for proteinuria.  Close BP monitoring?while on Avastin.  ?  Ok to proceed with cycle #3 of?Avastin/FOLFIRI (no 5-FU bolus; 5-FU infusion at 20% reduced level) today.  HOLD Avastin for 2+ protein in urine.  24hr urine protein; start tomorrow morning & return to lab 5/14/2020.  Follow up in 2 weeks with CBC, CMP Mg, urine protein.  Surveillance colonoscopy due 11/2022.  CBC and CMP every 2 weeks. CEA monitoring monthly.  BP monitoring and urine dipstick for proteinuria before each cycle of chemotherapy.  Restage with CT C/A/P with contrast after 4 cycles of chemotherapy; scheduled for 6/12/2020.  Ordered:  ?  2.?Chemotherapy-induced peripheral neuropathy?G62.0  Oxaliplatin induced neuropathy.? Mild.  Cymbalta started 02/21/2019  Ordered:  ?   Problem List/Past Medical History  Ongoing  Adenocarcinoma of cecum  CA - Cancer of colon  Chemotherapy-induced peripheral neuropathy  Hyperglobulinemia  Iron deficiency anemia  Liver metastases  Lung metastases  Morbid obesity  Peritoneal carcinomatosis  Historical  No qualifying data  Procedure/Surgical History  Colonoscopy (None) (11/14/2019)  Colonoscopy, flexible; diagnostic, including collection of specimen(s) by brushing or washing, when performed (separate procedure) (11/14/2019)  Inspection of Lower Intestinal Tract, Via Natural or Artificial Opening Endoscopic (11/14/2019)  Colon Tattoo (None) (09/27/2018)  Colonoscopy (None) (09/27/2018)  Colonoscopy, flexible; with directed submucosal injection(s), any substance (09/27/2018)  Colonoscopy, flexible; with removal of tumor(s), polyp(s), or other lesion(s) by snare technique (09/27/2018)  Excision of Transverse Colon, Via Natural or Artificial Opening Endoscopic, Diagnostic (09/27/2018)  Introduction of Other Therapeutic Substance into Lower GI, Via Natural or Artificial  Opening Endoscopic (09/27/2018)  Polypectomy (None) (09/27/2018)  Catheter Insertion Mediport (None) (06/27/2018)  Fluoroscopy of Superior Vena Cava using Low Osmolar Contrast, Guidance (06/27/2018)  Insertion of Infusion Device into Superior Vena Cava, Percutaneous Approach (06/27/2018)  Insertion of tunneled centrally inserted central venous access device, with subcutaneous port; age 5 years or older (06/27/2018)  Appendectomy Laparoscopic (None) (05/31/2018)  Inspection of Lower Intestinal Tract, Percutaneous Endoscopic Approach (05/31/2018)  Resection of Appendix, Open Approach (05/31/2018)  Resection of Right Large Intestine, Open Approach (05/31/2018)  colon resection   Medications  Avastin (for IVPB), 715 mg, 5 mg/kg, IV, Once-chemo  dexamethasone (for IVPB)  dexamethasone (for IVPB)  dexamethasone (for IVPB)  dexamethasone (for IVPB)  dexamethasone (for IVPB)  fluorouracil (for IVPB) -CCA  Heparin Flush 100 U/mL - 5 mL, 500 units= 5 mL, IV Push, Once-chemo  hydrALAZINE, 5 mg= 0.25 mL, IV Push, Once  levothyroxine 75 mcg (0.075 mg) oral tablet, 75 mcg= 1 tab(s), Oral, Daily, 3 refills  megestrol 40 mg/mL oral suspension, 800 mg= 20 mL, Oral, Daily, 1 refills  meperidine 25 mg/mL injectable solution, 25 mg= 1 mL, IM, Once  Phenergan, 12.5 mg= 0.5 mL, IM, Once  Phenergan 25 mg Tab, 25 mg= 1 tab(s), Oral, q6hr, 1 refills  Zofran (for IVPB) 16 mg + dexamethasone 10 mg/mL injectable solution 10 mg  Zofran (for IVPB) 16 mg + dexamethasone 10 mg/mL injectable solution 10 mg  Allergies  Norvasc?(Headache)  dilTIAZem?(Headache)  Social History  Abuse/Neglect  No, 05/12/2020  No, 05/08/2020  No, 04/29/2020  Alcohol  Never, 05/30/2018  Employment/School  Employed, 06/12/2018  Exercise  Exercise type: Walking., 10/31/2019  Home/Environment  Lives with Children, Spouse., 06/12/2018  Nutrition/Health  Regular, 06/12/2018  Sexual  Sexual orientation: Straight or heterosexual. Gender Identity Identifies as male.,  01/10/2019  Spiritual/Cultural  Orthodox, 10/31/2019  Substance Use  Never, 11/12/2018  Tobacco  Never (less than 100 in lifetime), N/A, 05/12/2020  Never (less than 100 in lifetime), N/A, 04/29/2020  Family History  Alcoholism.: Negative: Father.  Heart failure.: Brother.  Primary malignant neoplasm of prostate: Father.  Renal failure syndrome.: Mother.  Immunizations  Vaccine Date Status   influenza virus vaccine, inactivated 11/19/2019 Given   influenza virus vaccine, inactivated 12/17/2018 Given   Health Maintenance  Health Maintenance  ???Pending?(in the next year)  ??? ??OverDue  ??? ? ? ?Diabetes Screening due??and every?  ??? ? ? ?Alcohol Misuse Screening due??01/01/20??and every 1??year(s)  ??? ? ? ?ADL Screening due??03/20/20??and every 1??year(s)  ??? ??Refused?  ??? ? ? ?Tetanus Vaccine due??05/12/20??and every 10??year(s)  ??? ??Due In Future?  ??? ? ? ?Depression Screening not due until??12/05/20??and every 1??year(s)  ??? ? ? ?Obesity Screening not due until??01/01/21??and every 1??year(s)  ???Satisfied?(in the past 1 year)  ??? ??Satisfied?  ??? ? ? ?Alcohol Misuse Screening on??10/31/19.??Satisfied by Jose Schuster NP  ??? ? ? ?Blood Pressure Screening on??05/12/20.??Satisfied by Kelley Singleton LPN S  ??? ? ? ?Body Mass Index Check on??05/12/20.??Satisfied by Kelley Singleton LPN S  ??? ? ? ?Depression Screening on??12/06/19.??Satisfied by Kika Andres LPN.  ??? ? ? ?Diabetes Screening on??05/12/20.??Satisfied by Nhan Yeung Jr.  ??? ? ? ?Hypertension Management-Blood Pressure on??05/12/20.??Satisfied by Kelley Singleton LPN  ??? ? ? ?Influenza Vaccine on??11/19/19.??Satisfied by Solange Ontiveros RN  ??? ? ? ?Lipid Screening on??12/16/19.??Satisfied by Alyssa Austin  ??? ? ? ?Obesity Screening on??05/12/20.??Satisfied by Kelley Singleton LPN  ??? ??Refused?  ??? ? ? ?Tetanus Vaccine on??12/06/19.??Recorded by Kika Andres LPN??Reason: Patient Refuses  ?  Lab  Results  Test Name Test Result Date/Time Comments   Sodium Lvl 134 mmol/L (Low) 05/12/2020 07:40 CDT    Potassium Lvl 3.4 mmol/L (Low) 05/12/2020 07:40 CDT    Chloride 100 mmol/L 05/12/2020 07:40 CDT    CO2 24 mmol/L 05/12/2020 07:40 CDT    Calcium Lvl 8.8 mg/dL 05/12/2020 07:40 CDT    Magnesium Lvl 1.7 mg/dL 05/12/2020 07:40 CDT    Glucose Lvl 107 mg/dL (High) 05/12/2020 07:40 CDT    BUN 15 mg/dL 05/12/2020 07:40 CDT    Creatinine 1.60 mg/dL (High) 05/12/2020 07:40 CDT    eGFR-AA 63 mL/min (Low) 05/12/2020 07:40 CDT    eGFR-SHANT 52 mL/min (Low) 05/12/2020 07:40 CDT    Bili Total 0.6 mg/dL 05/12/2020 07:40 CDT    Bili Direct 0.2 mg/dL 05/12/2020 07:40 CDT    Bili Indirect 0.4 mg/dL 05/12/2020 07:40 CDT    AST 35 unit/L 05/12/2020 07:40 CDT    ALT 66 unit/L 05/12/2020 07:40 CDT    Alk Phos 123 unit/L (High) 05/12/2020 07:40 CDT    Total Protein 7.3 gm/dL 05/12/2020 07:40 CDT    Albumin Lvl 2.6 gm/dL (Low) 05/12/2020 07:40 CDT    Globulin 4.70 gm/mL (High) 05/12/2020 07:40 CDT    A/G Ratio 0.6 ratio (Low) 05/12/2020 07:40 CDT    CEA 27.2 ng/mL 05/12/2020 07:40 CDT 95.4% of healthy, non-smoking subjects may be expected to have CEA titers of less than 3.0 ng/mL   U Protein Lvl 118.8 mg/dL 05/12/2020 07:40 CDT No established reference range available   WBC 5.3 x10(3)/mcL 05/12/2020 07:40 CDT    RBC 3.71 x10(6)/mcL (Low) 05/12/2020 07:40 CDT    Hgb 10.5 gm/dL (Low) 05/12/2020 07:40 CDT    Hct 31.8 % (Low) 05/12/2020 07:40 CDT    Platelet 196 x10(3)/mcL 05/12/2020 07:40 CDT    MCV 85.7 fL 05/12/2020 07:40 CDT    MCH 28.3 pg 05/12/2020 07:40 CDT    MCHC 33.0 gm/dL 05/12/2020 07:40 CDT    RDW 15.0 % (High) 05/12/2020 07:40 CDT    MPV 9.8 fL 05/12/2020 07:40 CDT    Abs Neut 3.08 x10(3)/mcL 05/12/2020 07:40 CDT    Segs Man 57 % 05/12/2020 07:40 CDT    Band Man 3 % 05/12/2020 07:40 CDT    Lymph Man 30.0 % 05/12/2020 07:40 CDT    Monocyte Man 9 % 05/12/2020 07:40 CDT    Eos Man 1 % 05/12/2020 07:40 CDT    Basophil Man 0 %  05/12/2020 07:40 CDT    Platelet Est Adequate 05/12/2020 07:40 CDT    Anisocyte 1+ 05/12/2020 07:40 CDT    Microcyte 1+ 05/12/2020 07:40 CDT    Polychrom 1+ 05/12/2020 07:40 CDT    RBC Morph Abnormal 05/12/2020 07:40 CDT

## 2022-05-04 NOTE — HISTORICAL [facility]ERNER
This is a historical note converted from [place]. Formatting and pictures may have been removed.  Please reference [place] for original formatting and attached multimedia. History of Present Illness  Problem list  1. 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 ?Perforated cecal mass with peritonitis. ?All margins negative. ?Perineural invasion positive. ?13 out of 21 lymph nodes positive. ?Omentum positive for metastatic adenocarcinoma.?????  ?   Past medical history:?Obesity.  Social history:?. ?Has 2 children. ?Lives in [place], [place]. ?Drives trucks for living. ?Denies tobacco, alcohol, or illicit drug abuse.  Family history:?Father had colon cancer at age 50.  ?   Reason for visit:  Follow-up visit for?metastatic?adenocarcinoma of cecum?  ?   ?  History of present illness:  Patient is being followed for metastatic adenocarcinoma of cecum.? He also has iron deficiency anemia, hypertension, oxaliplatin induced neuropathy, and has required chemotherapy dose modifications.  ?  For details, see my last note dated 02/11/2020.? Also refer to assessment and plan section.  ?  ?  Interval History?  ?   04/13/2020:  -NTRK gene fusion negative.  -Completed 14 cycles of Avastin/FOLFOX on 03/27/2020  -Oxaliplatin dose was reduced from 85 mg/m? to 65 mg/m? from 35th cycle of chemotherapy onwards (from 01/15/2020 onwards)  -04/09/2020: Restaging CT C/A/P with contrast: Re-demonstrated carcinomatosis and metastatic lung and liver lesions, with interval enlargement since 02/03/2020  -04/30/2020: BUN 9.? Creatinine 1.6, chronically elevated.? AST 48, minimally elevated.? CEA level 41.9, consistently rising over last several months.? WBC 3.9.? Hemoglobin 11.2.? ANC 1.72.? Hemoglobin 11.2, stable.  Presents for follow-up visit.? His wife,  Reshma, joined the conversation via telephone.? He feels well, and denies any particular symptoms of concern. ?Good appetite.? Bowels moving normally.? No abdominal pain, nausea, vomiting, weakness, fatigue, chest pain, cough, dyspnea, unusual headaches, focal neurological symptoms, etc.? No stomatitis or diarrhea.  ?  ?  Review of Systems  A complete 12 point ROS was performed in full with pertinent positives as described in interval history. Remainder of ROS done in full and are negative.?  ?  ?   Physical Exam:  VITAL SIGNS: ?Reviewed. ? ?  GENERAL: ?In no apparent distress. ?  HEAD: ?No signs of head trauma.  EYES: ?Pupils are equal. ?Extraocular motions intact. ?  EARS: ?Hearing grossly intact.  MOUTH: ?Oropharynx is normal.?  NECK: ?No adenopathy, no JVD. ??  CHEST: ?Chest with clear breath sounds bilaterally. ?No wheezes, rales, or rhonchi. ?  CARDIAC: ?Regular rate and rhythm. ?S1 and S2, without murmurs, gallops, or rubs.  VASCULAR: ?No Edema. ?Peripheral pulses normal and equal in all extremities.  ABDOMEN: ?Soft, without detectable tenderness. ?No sign of distention. ?No ? rebound or guarding, and no masses palpated. ? Bowel Sounds normal.  MUSCULOSKELETAL: ?Good range of motion of all major joints. Extremities without clubbing, cyanosis or edema. ?  NEUROLOGIC EXAM: ?Alert and oriented x 3. ?No focal sensory or strength deficits. ? Speech normal. ?Follows commands.  PSYCHIATRIC: ?Mood normal.  SKIN: ?No rash or lesions.  ?  ?   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 ?Perforated cecal mass with peritonitis. ?All margins negative. ?Perineural invasion positive. ?13 out of 21 lymph nodes positive.  ?Omentum positive for metastatic adenocarcinoma.  -CEA level 30.4 on 06/01/2018 (1 day postop)  -MMR expression preserved. KRAS positive, NRAS negative, BRAF negative.  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  ?   #Sites of disease:  Cecum?(status post hemicolectomy);?omentum; liver; mesenteric lymph nodes  ?  ?  2.?Chemotherapy dose modifications:  -5-FU bolus discontinued after first cycle of FOLFOX?secondary to neutropenia  -5-FU infusion dose decreased by 20% from 10th cycle of chemotherapy onwards (from 12/17/2018 onwards)  -Oxaliplatin dose reduced from 85 mg/m??to 65 mg/m??from 35th cycle of chemotherapy onwards (from 01/15/2020 onwards)  ?  ????????  ?3. Iron Deficiency Anemia (labs 08/16/2018, ferritin 5.2)  ?-Received Feraheme 510 mg??2 doses, on 08/24/2018 and 08/31/2018, respectively.  -05/06/2019: Iron stores normal.? Ferritin 64.4.? Transferrin saturation 18.6%.? Iron and TIBC normal.  ????????  ?4. Hypertension,?controlled?with  compliance  -On?lisinopril 20mg/ [id number] po daily  ?  ?  5. ?Oxaliplatin induced neuropathy.? Mild.  Cymbalta started 02/21/2019  ?  ????????????????  Plan:  -Discontinue Avastin/FOLFOX  -Switch to Avastin/FOLFIRI (no 5-FU bolus; 5-FU infusion at 20% reduced level)  -Restage with CT C/A/P with contrast after 4 cycles of chemotherapy  -Surveillance colonoscopy 11/2022  -CBC and CMP every 2 weeks  -BP monitoring and urine dipstick for proteinuria before each cycle of chemotherapy  ?  Follow-up visit with NP in 2 weeks.  ?  Above discussed at length with him.? All questions answered.? Went over over labs and scans and gave him copies for his record.? He understands and agrees with this plan.  -------  ?  ?  Since preoperative colonoscopy was not feasible, therefore,?surveillance colonoscopy was performed 09/27/2018,?revealing a tubulovillous adenoma?in transverse colon.  1-year?surveillance colonoscopy (11/14/2019) was normal  Next surveillance colonoscopy will be due in 3 years (11/2022)  ?  Some progression of metastatic disease is noted on restaging scans (02/03/2020)  Progression of disease noted?post 40 cycles of chemotherapy?(CTs:?04/09/2020)  -->  Discontinue Avastin/FOLFOX  Switch to Avastin/FOLFIRI?(no 5-FU bolus;?5-FU infusion at 20%?dose reduced level)  ?  Restage with contrast-enhanced CT scans of chest, abdomen, and pelvis?after 4 cycles of Avastin/FOLFIRI?(in 2 months)  ?  -KRAS mutation positive; therefore,?not a candidate for treatment with cetuximab or Panitumumab.  -MMR expression preserved; therefore,?not a candidate for treatment with?nivolumab or pembrolizumab.  -NTRK gene fusion negative; therefore,?not a candidate for treatment with larotrectinib or entrectinib  ?  Continue to monitor CBC and CMP a couple of days before each cycle of chemotherapy,?to monitor for toxicity  ?  Check urine dipstick?protein before each cycle of?Avastin based chemotherapy, to monitor for proteinuria.  Close BP  monitoring?while on Avastin.  ?  Physical Exam  Vitals & Measurements  T:?37.2? ?C (Oral)? HR:?97(Peripheral)? RR:?18? BP:?138/91? SpO2:?100%?  HT:?184?cm? WT:?135.4?kg? BMI:?39.99?   Problem List/Past Medical History  Ongoing  Adenocarcinoma of cecum  Chemotherapy-induced peripheral neuropathy  Hyperglobulinemia  Iron deficiency anemia  Morbid obesity  Historical  No qualifying data  Procedure/Surgical History  Colonoscopy (None) (11/14/2019)  Colonoscopy, flexible; diagnostic, including collection of specimen(s) by brushing or washing, when performed (separate procedure) (11/14/2019)  Inspection of Lower Intestinal Tract, Via Natural or Artificial Opening Endoscopic (11/14/2019)  Colon Tattoo (None) (09/27/2018)  Colonoscopy (None) (09/27/2018)  Colonoscopy, flexible; with directed submucosal injection(s), any substance (09/27/2018)  Colonoscopy, flexible; with removal of tumor(s), polyp(s), or other lesion(s) by snare technique (09/27/2018)  Excision of Transverse Colon, Via Natural or Artificial Opening Endoscopic, Diagnostic (09/27/2018)  Introduction of Other Therapeutic Substance into Lower GI, Via Natural or Artificial Opening Endoscopic (09/27/2018)  Polypectomy (None) (09/27/2018)  Catheter Insertion Mediport (None) (06/27/2018)  Fluoroscopy of Superior Vena Cava using Low Osmolar Contrast, Guidance (06/27/2018)  Insertion of Infusion Device into Superior Vena Cava, Percutaneous Approach (06/27/2018)  Insertion of tunneled centrally inserted central venous access device, with subcutaneous port; age 5 years or older (06/27/2018)  Appendectomy Laparoscopic (None) (05/31/2018)  Inspection of Lower Intestinal Tract, Percutaneous Endoscopic Approach (05/31/2018)  Resection of Appendix, Open Approach (05/31/2018)  Resection of Right Large Intestine, Open Approach (05/31/2018)  colon resection   Medications  Avastin (for IVPB), 715 mg, 5 mg/kg, IV, Once-chemo  dexamethasone (for IVPB)  dexamethasone (for  IVPB)  dexamethasone (for IVPB)  dexamethasone (for IVPB)  dexamethasone (for IVPB)  fluorouracil (for IVPB) -CCA  Heparin Flush 100 U/mL - 5 mL, 500 units= 5 mL, IV Push, Once-chemo  hydrALAZINE, 5 mg= 0.25 mL, IV Push, Once  hydrochlorothiazide-lisinopril 12.5 mg-20 mg oral tablet, 2 tab(s), Oral, Daily, 1 refills  levothyroxine 75 mcg (0.075 mg) oral tablet, 75 mcg= 1 tab(s), Oral, Daily, 3 refills  megestrol 40 mg/mL oral suspension, 800 mg= 20 mL, Oral, Daily, 1 refills  meperidine 25 mg/mL injectable solution, 25 mg= 1 mL, IM, Once  Phenergan, 12.5 mg= 0.5 mL, IM, Once  Phenergan 25 mg Tab, 25 mg= 1 tab(s), Oral, q6hr, 1 refills  prochlorperazine 10 mg oral tablet, 10 mg= 1 tab(s), Oral, TID, PRN, 1 refills  Zofran (for IVPB) 16 mg + dexamethasone 10 mg/mL injectable solution 10 mg  Zofran (for IVPB) 16 mg + dexamethasone 10 mg/mL injectable solution 10 mg  Allergies  Norvasc?(Headache)  dilTIAZem?(Headache)  Social History  Abuse/Neglect  No, 04/06/2020  Alcohol  Never, 05/30/2018  Employment/School  Employed, 06/12/2018  Exercise  Exercise type: Walking., 10/31/2019  Home/Environment  Lives with Children, Spouse., 06/12/2018  Nutrition/Health  Regular, 06/12/2018  Sexual  Sexual orientation: Straight or heterosexual. Gender Identity Identifies as male., 01/10/2019  Spiritual/Cultural  Faith, 10/31/2019  Substance Use  Never, 11/12/2018  Tobacco  Never (less than 100 in lifetime), No, 04/06/2020  Family History  Alcoholism.: Negative: Father.  Heart failure.: Brother.  Primary malignant neoplasm of prostate: Father.  Renal failure syndrome.: Mother.  Immunizations  Vaccine Date Status   influenza virus vaccine, inactivated 11/19/2019 Given   influenza virus vaccine, inactivated 12/17/2018 Given       Check CEA level monthly.

## 2022-05-04 NOTE — HISTORICAL [facility]
This is a historical note converted from [place]. Formatting and pictures may have been removed.  Please reference [place] for original formatting and attached multimedia. Chief Complaint  Adenocarcinoma of cecum  History of Present Illness  Problem list:  1. 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 ?Perforated cecal mass with peritonitis. ?All margins negative. ?Perineural invasion positive. ?13 out of 21 lymph nodes positive. ?Omentum positive for metastatic adenocarcinoma.  ?   2. Hyperglobulinemia.  ?????  Current Treatment:  Regorafenib  Schedule will be 160 mg orally, once daily for the first 21 days of each 28 day cycle.?  To be taken with food.?  Available as 40 mg tablets. Therefore, to take 4 tablets daily.?  ?   -Started: 09/28/2020  ?   Treatment History:  1. S/p right hemicolectomy with ileocolonic anastomosis and partial omentectomy 05/31/2018  2. Palliative FOLFOX+ Avastin x40 cycles: 06/2018-03/25/2020, stopped d/t disease progression  3. Palliative FOLFIRI + Avastin x8 cycles 04/13/2020-08/10/2020, stopped d/t progression  ?   History of present illness:  Patient is being followed for metastatic adenocarcinoma of cecum.? He also has iron deficiency anemia, hypertension, oxaliplatin induced neuropathy, and has required chemotherapy dose modifications.  ?   For details, see?MD last note dated 02/11/2020.? Also refer to assessment and plan section.  ?   Interval History:  Patient presents today for a scheduled follow-up,?alone,?since initiating Stivarga on 09/28/2020. He states that he is taking Stivarga as prescribed.??He reports grade 2 HFS. He reports multiple blisters on his hands which he say are painful. He reports that he?is keeping his hands and feet very well moisturized.?He endorses no new problems or  concerns today.?He denies any GI symptoms. Denies HA, fever, night sweats, SOB, CP, edema, neuropathy. Denies N/V/C/D, abdominal pain, change in bowel/bladder habits, blood in the urine/stool. Appetite good, weight stable, denies unintentional weight loss/gain. Labs today reviewed with patient. Creatinine: 2.2, Total bili: 1.3, indirect bili: 0.3, CEA increased to day to 113.8 from 75.9, WBC: 2.6, ANC: 930. Reviewed patient medications. Patient denies needing any medication refills at this time. Denies questions/needs/concerns. BP stable today: 138/85 retake manual.  ?  ?  Review of Systems  A complete 12 point ROS was performed in full with pertinent positives described in interval history. Remainder of ROS done in full and are negative.?  Physical Exam  Vitals & Measurements  T:?37.2? ?C (Oral)? HR:?106(Peripheral)? RR:?18? BP:?139/97?  HT:?182.00?cm? WT:?100.500?kg? BMI:?30.34?  Vital Signs Reviewed  General: AAO, NAD noted  Eye: PERRLA, EOMI  Neuro: Normal mentation, strength 5/5 on all 4 extremities, no sensory deficits  HET: Normocephalic, adequate hearing,?no oral ulcers, mucous membranes pink/moist/intact, no pharyngeal erythema, poor dentition  Neck: Supple, non-tender, no lymphadenopathy  Respiratory: Non-labored breathing, symmetrical chest wall expansion, BBS CTA, no crackles/wheezing/rhonchi noted  Cardiovascular: S1S2 noted, RRR, no M/R/G, pulses equal in all extremities, normal peripheral perfusion  Abdomen: Soft, non-tender, non-distended, BS+, no hepatosplenomegaly  Musculoskeletal: Normal ROM, normal gait, ambulates without assistance  Integumentary: No rashes, no bruises or purpura, warm and dry, normal for ethnicity  Neurologic: No focal deficits, Cranial nerves II-XII are grossly intact.  Cognition and Speech: Speech clear and coherent, functional cognition intact  Psychiatric: Cooperative, appropriate mood and affect for situation, normal judgement, no suicidal or homicidal ideations  ?  ?    The National Cancer [employer] Toxicity Scores:  ?   ECOG Performance Scale: 2 - Ambulatory and capable of all selfcare but unable to carry out any work activities; up and about more than 50% of waking hours.  ?  Assessment/Plan  1.?Adenocarcinoma of cecum?C18.0  ? ?1.? Adenocarcinoma of cecum, stage IVC?(metastasis to omentum, liver, mesenteric lymph nodes)  ?? ?? 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 ?Perforated cecal mass with peritonitis. ?All margins negative. ?Perineural invasion positive. ?13 out of 21 lymph nodes positive. ?Omentum positive for metastatic adenocarcinoma.  -CEA level 30.4 on 06/01/2018 (1 day postop)  -MMR expression preserved. ??KRAS positive, NRAS negative, BRAF negative.  -NTRK gene fusion negative  ?-7/2/18: CT chest: No evidence of metastatic disease in the chest  ???-FOLFOX started 07/10/2018  ?-5-FU bolus discontinued after first cycle of FOLFOX because of neutropenia  ???-Avastin added to FOLFOX from third cycle onwards (allowing at least 8 weeks of postoperative recovery)  -New liver metastasis, improved mesenteric lymph nodes?(CTs: 09/11/2018); CEA dropping  -->?No change in treatment  -Colonoscopy (09/27/2018)?(because preoperative colonoscopy was not feasible):?Tubulovillous adenoma?in transverse colon, 3 cm, on a stalk  (Repeat colonoscopy in 1 year)  -Positive response to treatment?(CTs: 12/05/2018)?(post 9 cycles of chemotherapy)  -Continued positive response?(CTs: 03/11/2019)?(post 15 cycles of Avastin/FOLFOX)  -Stable?(CTs: 06/24/2019)?(post 23 cycles of Avastin/FOLFOX)  -Stable?(CT scan 08/2019)?(post?30 cycles of Avastin/FOLFOX)  -Surveillance colonoscopy (11/14/2019): Normal colonoscopy?(repeat in 3 years)  -Disease progression (CTs: 02/03/2020); same treatment continued  ?    ??>>>  Progression?on Avastin?+?FOLFOX:  -Disease progression post?40 cycles of Avastin/FOLFOX?(CTs:?04/09/2020)  -Avastin/FOLFIRI started 04/13/2020  -Restaging CTs?post 4 cycles (06/12/2020):?1?new mesenteric soft tissue nodule?(1 cm);?rest, stable  -Status post a bout of?diarrhea induced DACIA  ?? -Cycle #6 of Avastin/FOLFIRI 06/15/2020-06/17/2020  -Hospitalized [facility] 06/21/2020-06/23/2020: Positive blood culture. ?Acute colitis. ?Acquired neutropenia. ?Immunosuppressed. ?Chronic diarrhea secondary to chemotherapy. ?Admitted with nausea, vomiting, diarrhea, and abdominal pain. ?Hypokalemic. ?ANC 0.32.  -Cycle #7 of Avastin/FOLFIRI 07/27/2020-07/29/2020  -Irinotecan dose decreased from 180 mg/m??to 144 mg/m??from sixth cycle of chemotherapy onwards (from 07/06/2020) onwards  -Eighth cycle of chemotherapy head on 08/10/2020 due to neutropenia (ANC 1.01)  -Eighth cycle of chemotherapy held on 08/17/2020 due to neutropenia (ANC 0.91)  -08/26/2020: Restaging CT C/A/P with contrast (comparison: 06/21/2020): Interval increase in size of 2 metastatic peritoneal implants (1.8 cm, previously 1.6 cm; 1.7 cm, previously 1.46 cm); new mild right hydronephrosis, likely secondary to peritoneal metastatic disease, with decreased renal enhancement; minimal interval increase in size of a single pulmonary nodule with otherwise stable lung nodules; stable hepatic metastatic disease; mild diffuse bladder wall thickening; interval resolution of colonic wall thickening)  -CEA level rising (49.4 on 08/17/2020)  -->  ??Severe neutropenia post cycle #6, requiring hospitalization  Irinotecan dose decreased from seventh cycle of chemotherapy onwards  To recall,?5-FU bolus discontinued and 5-FU infusion?decreased by 20%?for many months  At least 3 weeks?delay?starting eighth cycle of chemotherapy due to persistent neutropenia  ?? Disease progression?on CTs (08/26/2020)  ?  ?   ?? #Sites of disease:  Cecum?(status post hemicolectomy);?omentum;  liver; mesenteric lymph nodes  ?  ?   #??Molecular markers:  ??KRAS mutation positive.? NRAS mutation negative.? BRAF mutation negative.  MMR expression preserved  NTRK gene fusion negative  ?  ?   ?? 2.?Chemotherapy dose modifications:  -5-FU bolus discontinued after first cycle of FOLFOX?secondary to neutropenia  -5-FU infusion dose decreased by 20% from 10th cycle of chemotherapy onwards (from 12/17/2018 onwards)  -Oxaliplatin dose reduced from 85 mg/m??to 65 mg/m??from 35th cycle of chemotherapy onwards (from 01/15/2020 onwards)  ?  ?? ????????  ?3. ??Iron Deficiency Anemia (labs 08/16/2018, ferritin 5.2)  ?-Received Feraheme 510 mg??2 doses, on 08/24/2018 and 08/31/2018, respectively.  -05/06/2019: Iron stores normal.? Ferritin 64.4.? Transferrin saturation 18.6%.? Iron and TIBC normal.  ?? ????????  ?4. Hypertension,?controlled?with compliance  -On?lisinopril 20mg/ [id number] po daily  ?  5. ??Oxaliplatin induced neuropathy  Cymbalta started 02/21/2019  ?  ?  ?? Plan:  ??-Intolerant of Avastin/FOLFIRI (recurrent neutropenia)  -Status post hospitalization with severe neutropenia  -Persistent neutropenia causing considerable delay in resuming chemotherapy despite dose modifications  -As a result, disease progression on CTs (08/26/2020)  ?  -At this time, treatment options: Regorafenib or trifluridine/tipiracil  -Prognosis, extremely guarded  -Switch to regorafenib  -Discussed side effects of regorafenib  -Provided him with educational materials from Up-To-Date?  ?  -Monitor CEA level monthly  -Restage with contrast-enhanced CT C/A/P 2 months after starting Dabrafenib  ?  -Surveillance colonoscopy 11/2022  ?  ?? Discussion:  ?? Regorafenib:  Schedule will be 160 mg orally, once daily for the first 21 days of each 28 day cycle.?  To be taken with food.?  Available as 40 mg tablets. Therefore, to take 4 tablets daily.?  Potential side effects include but are not limited to hemorrhage which can be severe and  life-threatening; dermatological toxicity; ?? hypertension; cardiac ischemia and infarction; reversible posterior leukoencephalopathy syndrome; GI perforation or fistulae; wound healing complications; asthenia, fatigue, decreased appetite, hand-foot syndrome (palmar plantar erythrodysesthesia), diarrhea, mucositis, weight loss, infections, hypertension, dysphonia.  ?  In ?? phase III CORRECT trial,  overall survival with regorafenib was 6.4 months (versus 5 months with placebo; hazard ratio 0.77);  progression free?survival was also significantly but modestly improved (1.9 months versus 1.7 months; hazard ratio 0.49).?  ?  In ?? randomized double-blind phase III CONCUR trial, after a median follow-up of 7.4 months, overall survival was 8.8 months with regorafenib versus 6.3 months in the placebo arm; hazard ratio 0.55).? Regorafenib has only shown activity in patients who have progressed on all standard therapy.? It can be given before or after trifluridine-tipiracil.  ?  ?? Since preoperative colonoscopy was not feasible, therefore,?surveillance colonoscopy was performed 09/27/2018,?revealing a tubulovillous adenoma?in transverse colon.  1-year?surveillance colonoscopy (11/14/2019) was normal  Next surveillance colonoscopy will be due in 3 years (11/2022)  ?  -KRAS mutation positive; therefore,?not a candidate for treatment with cetuximab or Panitumumab.  -MMR expression preserved; therefore,?not a candidate for treatment with?nivolumab or pembrolizumab.  -NTRK gene fusion negative; therefore,?not a candidate for treatment with larotrectinib or entrectinib  ?  -Decreased Stivarga dose to 120mg PO daily d/t HFS per UpToDate prescriber guidelines  -Restaging CT C/A/P scheduled: 11/30/2020  -C/W CEA monthly monitoring  ?  Ordered:  ?  RTC 2 weeks with NP with labs: CBC, CMP, Mg, CEA  ?  Discussed POC with patient, all questions answered. Instructed to call clinic for any issues or with any questions PRN, patient  verbalizes understanding.  ?   WILBERT Hansen, FNP-C   Problem List/Past Medical History  Ongoing  Adenocarcinoma of cecum  CA - Cancer of colon  Chemotherapy-induced peripheral neuropathy  HTN (hypertension)  Hyperglobulinemia  Iron deficiency anemia  Liver metastases  Lung metastases  Morbid obesity  Peritoneal carcinomatosis  Historical  No qualifying data  Procedure/Surgical History  Colonoscopy (None) (11/14/2019)  Colonoscopy, flexible; diagnostic, including collection of specimen(s) by brushing or washing, when performed (separate procedure) (11/14/2019)  Inspection of Lower Intestinal Tract, Via Natural or Artificial Opening Endoscopic (11/14/2019)  Colon Tattoo (None) (09/27/2018)  Colonoscopy (None) (09/27/2018)  Colonoscopy, flexible; with directed submucosal injection(s), any substance (09/27/2018)  Colonoscopy, flexible; with removal of tumor(s), polyp(s), or other lesion(s) by snare technique (09/27/2018)  Excision of Transverse Colon, Via Natural or Artificial Opening Endoscopic, Diagnostic (09/27/2018)  Introduction of Other Therapeutic Substance into Lower GI, Via Natural or Artificial Opening Endoscopic (09/27/2018)  Polypectomy (None) (09/27/2018)  Catheter Insertion Mediport (None) (06/27/2018)  Fluoroscopy of Superior Vena Cava using Low Osmolar Contrast, Guidance (06/27/2018)  Insertion of Infusion Device into Superior Vena Cava, Percutaneous Approach (06/27/2018)  Insertion of tunneled centrally inserted central venous access device, with subcutaneous port; age 5 years or older (06/27/2018)  Appendectomy Laparoscopic (None) (05/31/2018)  Inspection of Lower Intestinal Tract, Percutaneous Endoscopic Approach (05/31/2018)  Resection of Appendix, Open Approach (05/31/2018)  Resection of Right Large Intestine, Open Approach (05/31/2018)  colon resection   Medications  Avastin (for IVPB), 715 mg, 5 mg/kg, IV, Once-chemo  dexamethasone (for IVPB)  dexamethasone (for IVPB)  dexamethasone (for  IVPB)  dexamethasone (for IVPB)  dexamethasone (for IVPB)  fluorouracil (for IVPB) -CCA  Heparin Flush 100 U/mL - 5 mL, 500 units= 5 mL, IV Push, Once-chemo  hydrALAZINE, 5 mg= 0.25 mL, IV Push, Once  hydrochlorothiazide-lisinopril 12.5 mg-20 mg oral tablet, 2 tab(s), Oral, Daily, 3 refills  levothyroxine 100 mcg (0.1 mg) oral tablet, 100 mcg= 1 tab(s), Oral, Daily, 3 refills  megestrol 40 mg/mL oral suspension, 800 mg= 20 mL, Oral, Daily, 1 refills  meperidine 25 mg/mL injectable solution, 25 mg= 1 mL, IM, Once  ondansetron 8 mg oral tablet, 8 mg= 1 tab(s), Oral, TID  Phenergan, 12.5 mg= 0.5 mL, IM, Once  potassium chloride 20 mEq oral TABLET extended release, 20 mEq= 1 tab(s), Oral, Daily, 3 refills  Zofran (for IVPB) 16 mg + dexamethasone 10 mg/mL injectable solution 10 mg  Zofran (for IVPB) 16 mg + dexamethasone 10 mg/mL injectable solution 10 mg  Allergies  Norvasc?(Headache)  dilTIAZem?(Headache)  Social History  Abuse/Neglect  No, No, Yes, 09/24/2020  Alcohol  Never, 09/24/2020  Employment/School  Employed, 06/12/2018  Exercise  Exercise type: Walking., 10/31/2019  Home/Environment  Lives with Children, Spouse., 06/12/2018  Nutrition/Health  Regular, 06/12/2018  Sexual  Sexual orientation: Straight or heterosexual. Gender Identity Identifies as male., 01/10/2019  Spiritual/Cultural  Religious, 10/31/2019  Substance Use  Never, 09/24/2020  Tobacco  Never (less than 100 in lifetime), No, 09/24/2020  Family History  Alcoholism.: Negative: Father.  Heart failure.: Brother.  Primary malignant neoplasm of prostate: Father.  Renal failure syndrome.: Mother.  Immunizations  Vaccine Date Status   influenza virus vaccine, inactivated 11/19/2019 Given   influenza virus vaccine, inactivated 12/17/2018 Given   Health Maintenance  Health Maintenance  ???Pending?(in the next year)  ??? ??Due?  ??? ? ? ?Influenza Vaccine due??10/01/20??and every 1??day(s)  ??? ??Refused?  ??? ? ? ?Tetanus Vaccine due??10/08/20??and every  10??year(s)  ??? ??Due In Future?  ??? ? ? ?Obesity Screening not due until??01/01/21??and every 1??year(s)  ??? ? ? ?Alcohol Misuse Screening not due until??01/02/21??and every 1??year(s)  ??? ? ? ?ADL Screening not due until??06/11/21??and every 1??year(s)  ??? ? ? ?Hypertension Maintenance-Medication Prescribed not due until??06/11/21??and every 1??year(s)  ??? ? ? ?Hypertension Management-Education not due until??06/11/21??and every 1??year(s)  ??? ? ? ?Blood Pressure Screening not due until??09/24/21??and every 1??year(s)  ??? ? ? ?Body Mass Index Check not due until??09/24/21??and every 1??year(s)  ??? ? ? ?Depression Screening not due until??09/24/21??and every 1??year(s)  ??? ? ? ?Hypertension Management-BMP not due until??09/24/21??and every 1??year(s)  ??? ? ? ?Hypertension Management-Blood Pressure not due until??09/24/21??and every 1??year(s)  ???Satisfied?(in the past 1 year)  ??? ??Satisfied?  ??? ? ? ?ADL Screening on??06/11/20.??Satisfied by Kika Andres LPN  ??? ? ? ?Alcohol Misuse Screening on??06/11/20.??Satisfied by Jose Schuster NP  ??? ? ? ?Blood Pressure Screening on??09/24/20.??Satisfied by Dutch Springer  ??? ? ? ?Body Mass Index Check on??09/24/20.??Satisfied by Dutch Springer  ??? ? ? ?Depression Screening on??09/24/20.??Satisfied by Dutch Springer  ??? ? ? ?Diabetes Screening on??09/24/20.??Satisfied by Neri Matias  ??? ? ? ?Hypertension Management-Blood Pressure on??09/24/20.??Satisfied by Dutch Springer  ??? ? ? ?Hypertension Management-BMP on??09/24/20.??Satisfied by Neri Matias  ??? ? ? ?Hypertension Management-Education on??06/11/20.??Satisfied by Jose Schuster NP  ??? ? ? ?Hypertension Maintenance-Medication Prescribed on??06/11/20.??Satisfied by Jose Schuster NP  ??? ? ? ?Influenza Vaccine on??11/19/19.??Satisfied by Solange Ontiveros RN  ??? ? ? ?Lipid Screening on??12/16/19.??Satisfied by Alyssa Austin  ??? ? ? ?Obesity Screening  on??09/24/20.??Satisfied by Dutch Springer  ??? ??Refused?  ??? ? ? ?Tetanus Vaccine on??09/15/20.??Recorded by Kika Andres LPN??Reason: Patient Refuses  ?  Lab Results  Test Name Test Result Date/Time Comments   Sodium Lvl 135 mmol/L (Low) 10/08/2020 10:40 CDT    Potassium Lvl 4.0 mmol/L 10/08/2020 10:40 CDT    Chloride 102 mmol/L 10/08/2020 10:40 CDT    CO2 28 mmol/L 10/08/2020 10:40 CDT    Calcium Lvl 9.0 mg/dL 10/08/2020 10:40 CDT    Magnesium Lvl 2.2 mg/dL 10/08/2020 10:40 CDT    Glucose Lvl 109 mg/dL (High) 10/08/2020 10:40 CDT    BUN 14 mg/dL 10/08/2020 10:40 CDT    Creatinine 2.20 mg/dL (High) 10/08/2020 10:40 CDT    eGFR-AA 43 mL/min (Low) 10/08/2020 10:40 CDT    eGFR-SHANT 36 mL/min (Low) 10/08/2020 10:40 CDT    Bili Total 1.3 mg/dL (High) 10/08/2020 10:40 CDT    Bili Direct 0.3 mg/dL (High) 10/08/2020 10:40 CDT    Bili Indirect 1.0 mg/dL 10/08/2020 10:40 CDT    AST 30 unit/L 10/08/2020 10:40 CDT    ALT 19 unit/L 10/08/2020 10:40 CDT    Alk Phos 101 unit/L 10/08/2020 10:40 CDT    Total Protein 7.9 gm/dL 10/08/2020 10:40 CDT    Albumin Lvl 3.6 gm/dL 10/08/2020 10:40 CDT    Globulin 4.30 gm/mL (High) 10/08/2020 10:40 CDT    A/G Ratio 0.8 ratio (Low) 10/08/2020 10:40 CDT    .8 ng/mL 10/08/2020 10:40 CDT 95.4% of healthy, non-smoking subjects may be expected to have CEA titers of less than 3.0 ng/mL   WBC 2.6 x10(3)/mcL (Low) 10/08/2020 10:40 CDT    RBC 5.00 x10(6)/mcL 10/08/2020 10:40 CDT    Hgb 13.5 gm/dL 10/08/2020 10:40 CDT    Hct 42.0 % 10/08/2020 10:40 CDT    Platelet 134 x10(3)/mcL 10/08/2020 10:40 CDT    MCV 84.0 fL 10/08/2020 10:40 CDT    MCH 27.0 pg 10/08/2020 10:40 CDT    MCHC 32.1 gm/dL 10/08/2020 10:40 CDT    RDW 14.0 % 10/08/2020 10:40 CDT    MPV 9.4 fL 10/08/2020 10:40 CDT    Abs Neut 0.93 x10(3)/mcL (Low) 10/08/2020 10:40 CDT    Neutro Auto 36 % 10/08/2020 10:40 CDT    Lymph Auto 48 % 10/08/2020 10:40 CDT    Mono Auto 14 % 10/08/2020 10:40 CDT    Eos Auto 0 % 10/08/2020 10:40 CDT     Abs Eos 0.0 x10(3)/mcL 10/08/2020 10:40 CDT    Basophil Auto 1 % 10/08/2020 10:40 CDT    Abs Neutro 0.93 x10(3)/mcL (Low) 10/08/2020 10:40 CDT    Abs Lymph 1.2 x10(3)/mcL 10/08/2020 10:40 CDT    Abs Mono 0.4 x10(3)/mcL 10/08/2020 10:40 CDT    Abs Baso 0.0 x10(3)/mcL 10/08/2020 10:40 CDT    IG% 0 % 10/08/2020 10:40 CDT    IG# 0.0100 10/08/2020 10:40 CDT

## 2022-05-04 NOTE — HISTORICAL [facility]
This is a historical note converted from [place]. Formatting and pictures may have been removed.  Please reference [place] for original formatting and attached multimedia. Chief Complaint  Metastatic Colon Cancer  History of Present Illness  Problem list:  1. 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 ?Perforated cecal mass with peritonitis. ?All margins negative. ?Perineural invasion positive. ?13 out of 21 lymph nodes positive. ?Omentum positive for metastatic adenocarcinoma.  ?   2. Hyperglobulinemia.  ?????  Current Treatment:  Palliative FOLFIRI + Avastin every 2 weeks until disease progression or unacceptable toxicity  -Started:04/13/2020  ?   C#2 due: 04/27/2020  ?  Dose Modifications:  -5FU bolus d/grant, irinotecan and infusional 5FU dose reduced by 20%  ?   Treatment History:  1. S/p right hemicolectomy with ileocolonic anastomosis and partial omentectomy 05/31/2018  2. Palliative FOLFOX+ Avastin x40 cycles: 06/2018-03/25/2020, stopped d/t disease progression  ?   History of present illness:  37-year-old gentleman. ?On 5/31/18, presented to the hospital with 2 days of worsening diffuse abdominal pain associated with anorexia, nausea, and vomiting. ?Initially, the pain was intermittent but subsequently became constant and worsened in severity. ?It progressed to the point that any sort of movement caused him sharp abdominal pain. ?Denied fevers, chills, chest pains, dyspnea, hematochezia, hematemesis, or him melena. ?No such prior episodes. ?On examination, abdomen was diffusely tender to palpation but without any peritoneal signs; positive Rovsing sign; negative obturator sign and negative psoas sign.  ?  5/31/18:?CT A/P showed combination of grossly dilated and fluid-filled appendix and surrounding inflammatory changes  consistent with acute appendicitis; wall thickening also identified in the cecum near the base of the appendix most likely representing reactive changes although an underlying mass could not be excluded; the grossly distended and fluid-filled appendix measured up to 1.9 cm; a few locally prominent mesenteric lymph nodes also visible in the right lower quadrant, thought to be reactive.  ?  5/31/18:?Underwent right hemicolectomy with ileocolonic anastomosis and partial omentectomy. ?Perforated cecal mass with peritonitis was noted. ?Laparoscopic appendectomy was converted to open right hemicolectomy. Tumor involved cecum, ileocecal valve, extending into proximal appendix. ?Tumor size 7.2 cm in greatest dimension. ?No macroscopic tumor perforation. ?Moderately differentiated, grade 2. ?Tumor invades the visceral peritoneum (serosal surface). ?All margins negative for invasive carcinoma, high-grade dysplasia, or intramucosal adenocarcinoma. ?Lymphovascular invasion present. ?Perineural invasion not identified. ?Number of tumor deposits 5. ?Largest tumor deposit 1.1 cm. ?Largest lymph node metastasis 1 cm. ?Extracapsular lymph node extension of tumor present. Omentum, segmental resection, positive for metastatic adenocarcinoma; terminal ileum, ascending colon, segmental resection specimen positive for moderately differentiated adenocarcinoma. 13 out of 21 lymph nodes involved.  ?  MMR expression preserved. KRAS positive, NRAS negative, BRAF negative. 7/2/18: CT chest no evidence of metastatic disease in the chest. ?Baseline CEA 30.1?  ?   Interval History:  ?   Patient presents today for a scheduled follow-up,?alone. He had restaging scans on 04/09/2020 which showed progression. He was therefore changed and started on FOLFIRI/Avastin on 04/13/2020. Today he reports grade I diarrhea which?has resolved despite no OTC medication or intervention. He reports grade 1 nausea which was controlled with phenergan. He?states that  this first treatment was very different from his other treatment in that he feels?alot more weak and tired.??Denies HA, fever, night sweats, SOB, CP, edema, neuropathy. Denies N/V/C/D, abdominal pain, change in bowel/bladder habits, blood in the urine/stool. Appetite good, weight stable, denies unintentional weight loss/gain. Labs today adequate for treatment, reviewed with patient. ANC: 2130.?BP: 122/85, urine protein: 24.7. Reviewed patient medications. Patient denies needing any medication refills at this time. Denies questions/needs/concerns.  ?  Review of Systems  A complete 12 point ROS was performed in full with pertinent positives described in interval history. Remainder of ROS done in full and are negative.?  Physical Exam  Vitals & Measurements  T:?37.1? ?C (Oral)? HR:?101(Peripheral)? RR:?18? BP:?122/85?  HT:?184?cm? WT:?126.5?kg? BMI:?37.36?  Vital Signs Reviewed  General: AAO, NAD noted, obese  Eye: PERRLA, EOMI  Neuro: Normal mentation, strength 5/5 on all 4 extremities, no sensory deficits  HET: Normocephalic, adequate hearing,?no oral ulcers, mucous membranes pink/moist/intact, no pharyngeal erythema, poor dentition  Neck: Supple, non-tender, no lymphadenopathy  Respiratory: Non-labored breathing, symmetrical chest wall expansion, BBS CTA, no crackles/wheezing/rhonchi noted  Cardiovascular: S1S2 noted, RRR, no M/R/G, pulses equal in all extremities, normal peripheral perfusion  Abdomen: Soft, non-tender, non-distended, BS+, no hepatosplenomegaly  Musculoskeletal: Normal ROM, normal gait, ambulates without assistance  Integumentary: No rashes, no bruises or purpura, warm and dry, normal for ethnicity  Neurologic: No focal deficits, Cranial nerves II-XII are grossly intact.  Cognition and Speech: Speech clear and coherent, functional cognition intact  Psychiatric: Cooperative, appropriate mood and affect for situation, normal judgement, no suicidal or homicidal ideations  ?  ?   The National Cancer  [place] Toxicity Scores:  ?   ECOG Performance Scale: 1 - Strenuous physical activity restricted; fully ambulatory and able to carry out light work.  ?  Assessment/Plan  1.?Adenocarcinoma of cecum?C18.0  ? 1.? Adenocarcinoma of cecum, stage IV?(metastasis to omentum, liver, mesenteric lymph nodes)  ?? 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 ?Perforated cecal mass with peritonitis. ?All margins negative. ?Perineural invasion positive. ?13 out of 21 lymph nodes positive. ?Omentum positive for metastatic adenocarcinoma.  -CEA level 30.4 on 06/01/2018 (1 day postop)  -MMR expression preserved. KRAS positive, NRAS negative, BRAF negative.  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  ?   #Sites of disease:  Cecum?(status post hemicolectomy);?omentum; liver;  mesenteric lymph nodes  ?  ?   2.?Chemotherapy dose modifications:  -5-FU bolus discontinued after first cycle of FOLFOX?secondary to neutropenia  -5-FU infusion dose decreased by 20% from 10th cycle of chemotherapy onwards (from 12/17/2018 onwards)  -Oxaliplatin dose reduced from 85 mg/m??to 65 mg/m??from 35th cycle of chemotherapy onwards (from 01/15/2020 onwards)  ?   ????????  ?3. Iron Deficiency Anemia (labs 08/16/2018, ferritin 5.2)  ?-Received Feraheme 510 mg??2 doses, on 08/24/2018 and 08/31/2018, respectively.  -05/06/2019: Iron stores normal.? Ferritin 64.4.? Transferrin saturation 18.6%.? Iron and TIBC normal.  ????????  ?4. Hypertension,?controlled?with compliance  -On?lisinopril 20mg/ [id number] po daily  ?  ?   5. ?Oxaliplatin induced neuropathy.? Mild.  Cymbalta started 02/21/2019  ?  ????????????????  Plan:  -Discontinue Avastin/FOLFOX  -Switch to Avastin/FOLFIRI (no 5-FU bolus; 5-FU infusion at 20% reduced level)  -Restage with CT C/A/P with contrast after 4 cycles of chemotherapy  -Surveillance colonoscopy 11/2022  -CBC and CMP every 2 weeks  -BP monitoring and urine dipstick for proteinuria before each cycle of chemotherapy  ?  Since preoperative colonoscopy was not feasible, therefore,?surveillance colonoscopy was performed 09/27/2018,?revealing a tubulovillous adenoma?in transverse colon.  1-year?surveillance colonoscopy (11/14/2019) was normal  Next surveillance colonoscopy will be due in 3 years (11/2022)  ?  Some progression of metastatic disease is noted on restaging scans (02/03/2020)  Progression of disease noted?post 40 cycles of chemotherapy?(CTs:?04/09/2020)  -->  Discontinue Avastin/FOLFOX  Switch to Avastin/FOLFIRI?(no 5-FU bolus;?5-FU infusion at 20%?dose reduced level)  ?  Restage with contrast-enhanced CT scans of chest, abdomen, and pelvis?after 4 cycles of Avastin/FOLFIRI?(in 2 months)  ?  -KRAS mutation positive; therefore,?not a candidate for treatment with cetuximab or  Panitumumab.  -MMR expression preserved; therefore,?not a candidate for treatment with?nivolumab or pembrolizumab.  -NTRK gene fusion negative; therefore,?not a candidate for treatment with larotrectinib or entrectinib  ?  Continue to monitor CBC and CMP a couple of days before each cycle of chemotherapy,?to monitor for toxicity  ?  Check urine dipstick?protein before each cycle of?Avastin based chemotherapy, to monitor for proteinuria.  Close BP monitoring?while on Avastin.  ?  -Proceed with C#2 FOLFIRI/Avastin on 04/27/2020 with hydration added d/t recent diarrhea and dehydration.  -Restaging CT C/A/P scheduled: 06/21/2020  -CEA monitoring monthly  ?  RTC 2 weeks with NP with labs: CBC, CMP, Mg, CEA, urine protein  ?  Discussed POC with patient, all questions answered. Instructed to call clinic for any issues or with any questions PRN, patient verbalizes understanding.  ?   WILBERT Hansen, FNP-C   Problem List/Past Medical History  Ongoing  Adenocarcinoma of cecum  Chemotherapy-induced peripheral neuropathy  Hyperglobulinemia  Iron deficiency anemia  Liver metastases  Lung metastases  Morbid obesity  Peritoneal carcinomatosis  Historical  No qualifying data  Procedure/Surgical History  Colonoscopy (None) (11/14/2019)  Colonoscopy, flexible; diagnostic, including collection of specimen(s) by brushing or washing, when performed (separate procedure) (11/14/2019)  Inspection of Lower Intestinal Tract, Via Natural or Artificial Opening Endoscopic (11/14/2019)  Colon Tattoo (None) (09/27/2018)  Colonoscopy (None) (09/27/2018)  Colonoscopy, flexible; with directed submucosal injection(s), any substance (09/27/2018)  Colonoscopy, flexible; with removal of tumor(s), polyp(s), or other lesion(s) by snare technique (09/27/2018)  Excision of Transverse Colon, Via Natural or Artificial Opening Endoscopic, Diagnostic (09/27/2018)  Introduction of Other Therapeutic Substance into Lower GI, Via Natural or Artificial Opening  Endoscopic (09/27/2018)  Polypectomy (None) (09/27/2018)  Catheter Insertion Mediport (None) (06/27/2018)  Fluoroscopy of Superior Vena Cava using Low Osmolar Contrast, Guidance (06/27/2018)  Insertion of Infusion Device into Superior Vena Cava, Percutaneous Approach (06/27/2018)  Insertion of tunneled centrally inserted central venous access device, with subcutaneous port; age 5 years or older (06/27/2018)  Appendectomy Laparoscopic (None) (05/31/2018)  Inspection of Lower Intestinal Tract, Percutaneous Endoscopic Approach (05/31/2018)  Resection of Appendix, Open Approach (05/31/2018)  Resection of Right Large Intestine, Open Approach (05/31/2018)  colon resection   Medications  Avastin (for IVPB), 715 mg, 5 mg/kg, IV, Once-chemo  dexamethasone (for IVPB)  dexamethasone (for IVPB)  dexamethasone (for IVPB)  dexamethasone (for IVPB)  dexamethasone (for IVPB)  fluorouracil (for IVPB) -CCA  Heparin Flush 100 U/mL - 5 mL, 500 units= 5 mL, IV Push, Once-chemo  hydrALAZINE, 5 mg= 0.25 mL, IV Push, Once  hydrochlorothiazide-lisinopril 12.5 mg-20 mg oral tablet, 2 tab(s), Oral, Daily, 1 refills  levothyroxine 75 mcg (0.075 mg) oral tablet, 75 mcg= 1 tab(s), Oral, Daily, 3 refills  megestrol 40 mg/mL oral suspension, 800 mg= 20 mL, Oral, Daily, 1 refills  meperidine 25 mg/mL injectable solution, 25 mg= 1 mL, IM, Once  Phenergan, 12.5 mg= 0.5 mL, IM, Once  Phenergan 25 mg Tab, 25 mg= 1 tab(s), Oral, q6hr, 1 refills  prochlorperazine 10 mg oral tablet, 10 mg= 1 tab(s), Oral, TID, PRN, 1 refills  Zofran (for IVPB) 16 mg + dexamethasone 10 mg/mL injectable solution 10 mg  Zofran (for IVPB) 16 mg + dexamethasone 10 mg/mL injectable solution 10 mg  Allergies  Norvasc?(Headache)  dilTIAZem?(Headache)  Social History  Abuse/Neglect  No, 04/15/2020  Alcohol  Never, 05/30/2018  Employment/School  Employed, 06/12/2018  Exercise  Exercise type: Walking., 10/31/2019  Home/Environment  Lives with Children, Spouse.,  06/12/2018  Nutrition/Health  Regular, 06/12/2018  Sexual  Sexual orientation: Straight or heterosexual. Gender Identity Identifies as male., 01/10/2019  Spiritual/Cultural  Church, 10/31/2019  Substance Use  Never, 11/12/2018  Tobacco  Never (less than 100 in lifetime), N/A, 04/15/2020  Family History  Alcoholism.: Negative: Father.  Heart failure.: Brother.  Primary malignant neoplasm of prostate: Father.  Renal failure syndrome.: Mother.  Immunizations  Vaccine Date Status   influenza virus vaccine, inactivated 11/19/2019 Given   influenza virus vaccine, inactivated 12/17/2018 Given   Health Maintenance  Health Maintenance  ???Pending?(in the next year)  ??? ??OverDue  ??? ? ? ?Diabetes Screening due??and every?  ??? ? ? ?Alcohol Misuse Screening due??01/01/20??and every 1??year(s)  ??? ? ? ?ADL Screening due??03/20/20??and every 1??year(s)  ??? ??Refused?  ??? ? ? ?Tetanus Vaccine due??04/24/20??and every 10??year(s)  ??? ??Due In Future?  ??? ? ? ?Depression Screening not due until??12/05/20??and every 1??year(s)  ??? ? ? ?Obesity Screening not due until??01/01/21??and every 1??year(s)  ??? ? ? ?Hypertension Management-BMP not due until??04/13/21??and every 1??year(s)  ??? ? ? ?Blood Pressure Screening not due until??04/15/21??and every 1??year(s)  ??? ? ? ?Body Mass Index Check not due until??04/15/21??and every 1??year(s)  ??? ? ? ?Hypertension Management-Blood Pressure not due until??04/15/21??and every 1??year(s)  ???Satisfied?(in the past 1 year)  ??? ??Satisfied?  ??? ? ? ?Alcohol Misuse Screening on??10/31/19.??Satisfied by Jose Schuster NP  ??? ? ? ?Blood Pressure Screening on??04/15/20.??Satisfied by Solange Ontiveros RN  ??? ? ? ?Body Mass Index Check on??04/15/20.??Satisfied by Solange Ontiveros RN  ??? ? ? ?Depression Screening on??12/06/19.??Satisfied by Kika Andres LPN.  ??? ? ? ?Diabetes Screening on??04/13/20.??Satisfied by Nhan Yeung Jr.  ??? ? ? ?Hypertension Management-Blood  Pressure on??04/15/20.??Satisfied by Solange Ontiveros RN  ??? ? ? ?Influenza Vaccine on??11/19/19.??Satisfied by Solange Ontiveros RN  ??? ? ? ?Lipid Screening on??12/16/19.??Satisfied by Alyssa Austin  ??? ? ? ?Obesity Screening on??04/15/20.??Satisfied by Solange Ontiveros RN  ??? ??Refused?  ??? ? ? ?Tetanus Vaccine on??12/06/19.??Recorded by Kika Andres LPN??Reason: Patient Refuses  ?  Lab Results  Test Name Test Result Date/Time Comments   Sodium Lvl 134 mmol/L (Low) 04/27/2020 09:10 CDT    Potassium Lvl 3.4 mmol/L (Low) 04/27/2020 09:10 CDT    Chloride 102 mmol/L 04/27/2020 09:10 CDT    CO2 25 mmol/L 04/27/2020 09:10 CDT    Calcium Lvl 9.4 mg/dL 04/27/2020 09:10 CDT    Magnesium Lvl 1.9 mg/dL 04/27/2020 09:10 CDT    Glucose Lvl 104 mg/dL 04/27/2020 09:10 CDT    BUN 12 mg/dL 04/27/2020 09:10 CDT    Creatinine 1.50 mg/dL (High) 04/27/2020 09:10 CDT    eGFR-AA 67 mL/min (Low) 04/27/2020 09:10 CDT    eGFR-SHANT 56 mL/min (Low) 04/27/2020 09:10 CDT    Bili Total 0.6 mg/dL 04/27/2020 09:10 CDT    Bili Direct 0.2 mg/dL 04/27/2020 09:10 CDT    Bili Indirect 0.4 mg/dL 04/27/2020 09:10 CDT    AST 28 unit/L 04/27/2020 09:10 CDT    ALT 24 unit/L 04/27/2020 09:10 CDT    Alk Phos 86 unit/L 04/27/2020 09:10 CDT    Total Protein 7.9 gm/dL 04/27/2020 09:10 CDT    Albumin Lvl 3.4 gm/dL 04/27/2020 09:10 CDT    Globulin 4.50 gm/mL (High) 04/27/2020 09:10 CDT    A/G Ratio 0.8 ratio (Low) 04/27/2020 09:10 CDT    CEA 34.3 ng/mL 04/27/2020 09:10 CDT 95.4% of healthy, non-smoking subjects may be expected to have CEA titers of less than 3.0 ng/mL   U Protein Lvl 24.7 mg/dL 04/27/2020 09:05 CDT No established reference range available   WBC 4.4 x10(3)/mcL (Low) 04/27/2020 09:10 CDT    RBC 3.90 x10(6)/mcL (Low) 04/27/2020 09:10 CDT    Hgb 11.1 gm/dL (Low) 04/27/2020 09:10 CDT    Hct 34.3 % (Low) 04/27/2020 09:10 CDT    Platelet 142 x10(3)/mcL 04/27/2020 09:10 CDT    MCV 87.9 fL 04/27/2020 09:10 CDT    MCH 28.5 pg 04/27/2020 09:10 CDT     MCHC 32.4 gm/dL 04/27/2020 09:10 CDT    RDW 15.5 % (High) 04/27/2020 09:10 CDT    MPV 9.2 fL 04/27/2020 09:10 CDT    Abs Neut 2.13 x10(3)/mcL 04/27/2020 09:10 CDT    Neutro Auto 48 % 04/27/2020 09:10 CDT    Lymph Auto 31 % 04/27/2020 09:10 CDT    Mono Auto 10 % 04/27/2020 09:10 CDT    Eos Auto 11 % 04/27/2020 09:10 CDT    Abs Eos 0.5 x10(3)/mcL 04/27/2020 09:10 CDT    Basophil Auto 0 % 04/27/2020 09:10 CDT    Abs Neutro 2.13 x10(3)/mcL 04/27/2020 09:10 CDT    Abs Lymph 1.4 x10(3)/mcL 04/27/2020 09:10 CDT    Abs Mono 0.4 x10(3)/mcL 04/27/2020 09:10 CDT    Abs Baso 0.0 x10(3)/mcL 04/27/2020 09:10 CDT    IG% 0 % 04/27/2020 09:10 CDT    IG# 0.0100 04/27/2020 09:10 CDT

## 2022-05-04 NOTE — HISTORICAL [facility]
This is a historical note converted from [place]. Formatting and pictures may have been removed.  Please reference [place] for original formatting and attached multimedia. 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 ?Since then the patient has undergone?multiple cycles of chemotherapy. ?Patient reports nausea vomiting secondary to the chemotherapy. ?He denies?loose bowel movements cellular to the?chemotherapy and the bowel prep. ?Patient states he has lost 15 pounds in the last 3 days. ?Patient?denies any abdominal pain.? He denies any constipation?or bright red blood per rectum or on the toilet?bowl or on the tissue paper.? Patient is here for surveillance colonoscopy.  ?  Review of Systems  Constitutional:?no fever, fatigue, weakness  Eye:?no vision loss, eye redness, drainage, or pain  ENMT:?no sore throat, ear pain, sinus pain/congestion, nasal congestion/drainage  Respiratory:?no cough, no wheezing, no shortness of breath  Cardiovascular:?no chest pain, no palpitations, no edema  Gastrointestinal:?no nausea, vomiting, or diarrhea. No abdominal pain  Musculoskeletal:?no muscle or joint pain, no joint swelling  Integumentary:?no skin rash or abnormal lesion  Neurologic: no headache, no dizziness, no weakness or numbness  Physical Exam  General:?well-developed well-nourished in no acute distress  Eye: PERRLA, EOMI, clear conjunctiva, eyelids normal  HENT:?TMs/ear canals clear, oropharynx without erythema/exudate  Neck: full range of motion, no thyromegaly or lymphadenopathy  Respiratory:?clear to auscultation bilaterally  Cardiovascular:?regular rate and rhythm without  Gastrointestinal:?soft, non-tender, non-distended with normal bowel  sounds, without masses to palpation, well healed midline scar  Genitourinary: no CVA tenderness to palpation  Musculoskeletal:?full range of motion of all extremities/spine without limitation or discomfort  Integumentary: no rashes or skin lesions present  Neurologic: cranial nerves intact, no signs of peripheral neurological deficit, motor/sensory function intact  Assessment/Plan  37-year-old male with a history of?adenocarcinoma?status post?large and?small bowel resection  ?  -To the endoscopy suite for colonoscopy  -Consent obtained placed on chart of the patient verbalized understanding risks benefits alternatives to the procedure   Problem List/Past Medical History  Ongoing  Encounter for education about infusion care  Iron deficiency anemia  Metastatic colon cancer to liver  Morbid obesity  Historical  No qualifying data  Procedure/Surgical History  Catheter Insertion Mediport (None) (06/27/2018)  Fluoroscopy of Superior Vena Cava using Low Osmolar Contrast, Guidance (06/27/2018)  Insertion of Infusion Device into Superior Vena Cava, Percutaneous Approach (06/27/2018)  Insertion of tunneled centrally inserted central venous access device, with subcutaneous port; age 5 years or older (06/27/2018)  Appendectomy Laparoscopic (None) (05/31/2018)  Inspection of Lower Intestinal Tract, Percutaneous Endoscopic Approach (05/31/2018)  Resection of Appendix, Open Approach (05/31/2018)  Resection of Right Large Intestine, Open Approach (05/31/2018)  colon resection   Medications  Inpatient  dexamethasone (for IVPB)  fluorouracil (for IVPB) -CCA  hydrALAZINE, 5 mg= 0.25 mL, IV Push, Once  meperidine 25 mg/mL injectable solution, 25 mg= 1 mL, IM, Once  Phenergan, 12.5 mg= 0.5 mL, IM, Once  Home  hydrochlorothiazide-lisinopril 25 mg-20 mg oral tablet, 1 tab(s), Oral, Daily  HYDROCODONE-ACETAMIN 5-325 MG,? ?Not taking  Phenergan 25 mg Tab, 25 mg= 1 tab(s), Oral, q6hr, 1 refills  Zofran 8 mg oral tablet, 8 mg= 1 tab(s), Oral,  q8hr, PRN, 1 refills  Allergies  No Known Allergies  Social History  Alcohol  Never, 05/30/2018  Employment/School  Employed, 06/12/2018  Home/Environment  Lives with Children, Spouse., 06/12/2018  Nutrition/Health  Regular, 06/12/2018  Substance Abuse  Never, 05/30/2018  Tobacco  Never smoker Use:., 05/30/2018  Family History  Alcoholism.: Negative: Father.  Primary malignant neoplasm of prostate: Father.  Renal failure syndrome.: Mother.

## 2022-05-04 NOTE — HISTORICAL [facility]
This is a historical note converted from [employer]. Formatting and pictures may have been removed.  Please reference [place] for original formatting and attached multimedia. History of Present Illness  Problem list  1. 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 ?Perforated cecal mass with peritonitis. ?All margins negative. ?Perineural invasion positive. ?13 out of 21 lymph nodes positive. ?Omentum positive for metastatic adenocarcinoma.?????  ?   Past medical history:?Obesity.  Social history:?. ?Has 2 children. ?Lives in [place], [place]. ?Drives trucks for living. ?Denies tobacco, alcohol, or illicit drug abuse.  Family history:?Father had colon cancer at age 50.  ?   Reason for visit:  Follow-up visit for?metastatic?adenocarcinoma of cecum?  ?   ?  History of present illness:  Patient is being followed for metastatic adenocarcinoma of cecum.? He also has iron deficiency anemia, hypertension, oxaliplatin induced neuropathy, and has required chemotherapy dose modifications.  ?   For details, see my last note dated 02/11/2020.? Also refer to assessment and plan section.  ?  ?   Interval History?  ?   06/15/2020:  -Avastin/FOLFIRI started 04/13/2020  -Completed 4 cycles of Avastin/FOLFIRI 06/03/2020  -06/12/2020: Restaging CT C/A/P with contrast (post 4 cycles of Avastin/FOLFIRI): With the exception of one new mesenteric soft tissue nodule (1 cm) in the left upper quadrant, metastatic disease in the lungs and abdomen/pelvis appears stable  -With Avastin/FOLFIRI, CEA level dropping (27.2 on?05/12/2020)  -06/15/2020: WBC 3.6.? ANC 1.9.? Hemoglobin 10.9.? Platelets 214,000/mm?. Urine protein 20.6 mg/dL, trace.  Presents for follow-up visit. ?Overall, doing very well. ?Good appetite.? Good energy level. ?Experiences?self-limited  nausea for 2 to 3 days after chemotherapy for which he takes Phenergan,?with good control.? Experiences 3-4 loose bowel movements daily for 3 to 4 days?after chemotherapy for which he takes Imodium, with good control.? No other symptoms of concern.? No blood in stool.  ?  ?  Review of Systems  A complete 12 point ROS was performed in full with pertinent positives as described in interval history. Remainder of ROS done in full and are negative.?  ?  ?  Physical Exam:  VITAL SIGNS: ?Reviewed. ? ?  GENERAL: ?In no apparent distress. ?  HEAD: ?No signs of head trauma.  EYES: ?Pupils are equal. ?Extraocular motions intact. ?  EARS: ?Hearing grossly intact.  MOUTH: ?Oropharynx is normal.?  NECK: ?No adenopathy, no JVD. ??  CHEST: ?Chest with clear breath sounds bilaterally. ?No wheezes, rales, or rhonchi. ?  CARDIAC: ?Regular rate and rhythm. ?S1 and S2, without murmurs, gallops, or rubs.  VASCULAR: ?No Edema. ?Peripheral pulses normal and equal in all extremities.  ABDOMEN: ?Soft, without detectable tenderness. ?No sign of distention. ?No ? rebound or guarding, and no masses palpated. ? Bowel Sounds normal.  MUSCULOSKELETAL: ?Good range of motion of all major joints. Extremities without clubbing, cyanosis or edema. ?  NEUROLOGIC EXAM: ?Alert and oriented x 3. ?No focal sensory or strength deficits. ? Speech normal. ?Follows commands.  PSYCHIATRIC: ?Mood normal.  SKIN: ?No rash or lesions.  ?  ?  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 ?Perforated cecal mass with peritonitis. ?All margins negative. ?Perineural invasion positive. ?13 out of 21 lymph nodes positive. ?Omentum positive for metastatic adenocarcinoma.  -CEA  level 30.4 on 06/01/2018 (1 day postop)  -MMR expression preserved. KRAS positive, NRAS negative, BRAF negative.  -NTRK gene fusion negative  ?-7/2/18: CT chest: No evidence of metastatic disease in the chest  ?-FOLFOX started 07/10/2018  ?-5-FU bolus discontinued after first cycle of FOLFOX because of neutropenia  ?-Avastin added to FOLFOX from third cycle onwards (allowing at least 8 weeks of postoperative recovery)  -New liver metastasis, improved mesenteric lymph nodes?(CTs: 09/11/2018); CEA dropping  -->?No change in treatment  -Colonoscopy (09/27/2018)?(because preoperative colonoscopy was not feasible):?Tubulovillous adenoma?in transverse colon, 3 cm, on a stalk  (Repeat colonoscopy in 1 year)  -Positive response to treatment?(CTs: 12/05/2018)?(post 9 cycles of chemotherapy)  -Continued positive response?(CTs: 03/11/2019)?(post 15 cycles of Avastin/FOLFOX)  -Stable?(CTs: 06/24/2019)?(post 23 cycles of Avastin/FOLFOX)  -Stable?(CT scan 08/2019)?(post?30 cycles of Avastin/FOLFOX)  -Surveillance colonoscopy (11/14/2019): Normal colonoscopy?(repeat in 3 years)  -Disease progression (CTs: 02/03/2020); same treatment continued  ?  >>>  Progression:  -Disease progression post?40 cycles of Avastin/FOLFOX?(CTs:?04/09/2020)  -Avastin/FOLFIRI started 04/13/2020  -Restaging CTs?post 4 cycles (06/12/2020):?1?new mesenteric soft tissue nodule?(1 cm);?rest, stable  -Status post a bout of?diarrhea induced DACIA  ?  ?  #Sites of disease:  Cecum?(status post hemicolectomy);?omentum; liver; mesenteric lymph nodes  ?  ?  #Molecular markers:  KRAS mutation positive.? NRAS mutation negative.? BRAF mutation negative.  MMR expression preserved  NTRK gene fusion negative  ?  ?  2.?Chemotherapy dose modifications:  -5-FU bolus discontinued after first cycle of FOLFOX?secondary to neutropenia  -5-FU infusion dose decreased by 20% from 10th cycle of chemotherapy onwards (from 12/17/2018 onwards)  -Oxaliplatin dose reduced from 85 mg/m??to  65 mg/m??from 35th cycle of chemotherapy onwards (from 01/15/2020 onwards)  ?  ????????  ?3. Iron Deficiency Anemia(labs 08/16/2018, ferritin 5.2)  ?-Received Feraheme 510 mg??2 doses, on 08/24/2018 and 08/31/2018, respectively.  -05/06/2019: Iron stores normal.? Ferritin 64.4.? Transferrin saturation 18.6%.? Iron and TIBC normal.  ????????  ?4. Hypertension,?controlled?with compliance  -On?lisinopril 20mg/ [id number] po daily  ?  5. ?Oxaliplatin induced neuropathy.? Mild.  Cymbalta started 02/21/2019  ?  ????????????????  Plan:  -Continue Avastin/FOLFIRI  -Fifth cycle due today (06) 2020)  -Restage with contrast-enhanced contrast-enhanced CT C/A/P?in 2 months (August)  -Monitor CEA level monthly  -Surveillance colonoscopy 11/2022  -Check CBC and CMP every couple of weeks?before each cycle of chemotherapy  -BP monitoring?with Avastin  -Urine dipstick for proteinuria before each cycle of chemotherapy  -Check iron stores  ?  Follow-up visit with NP in 2 weeks, with CBC and CMP.  ?  Above discussed at length with him?and his wife?via face time?on his cell phone. ?All questions answered. ?Went over labs and scans and gave him copies for his record.? At the time of dictation, CMP and CEA levels are pending. ?He understands and agrees with this plan.  ------------  ?  ?  Since preoperative colonoscopy was not feasible, therefore,?surveillance colonoscopy was performed 09/27/2018,?revealing a tubulovillous adenoma?in transverse colon.  1-year?surveillance colonoscopy (11/14/2019) was normal  Next surveillance colonoscopy will be due in 3 years (11/2022)  ?  ?  -KRAS mutation positive; therefore,?not a candidate for treatment with cetuximab or Panitumumab.  -MMR expression preserved; therefore,?not a candidate for treatment with?nivolumab or pembrolizumab.  -NTRK gene fusion negative; therefore,?not a candidate for treatment with larotrectinib or entrectinib  ?  Continue to monitor CBC and CMP a couple of days before each  cycle of chemotherapy,?to monitor for toxicity  ?  Check urine dipstick?protein before each cycle of?Avastin based chemotherapy, to monitor for proteinuria.  Close BP monitoring?while on Avastin.  Physical Exam  Vitals & Measurements  T:?37.1? ?C (Oral)? HR:?99(Peripheral)? RR:?18? BP:?122/82? SpO2:?100%?  HT:?182?cm? WT:?111.2?kg? BMI:?33.57?   Problem List/Past Medical History  Ongoing  Adenocarcinoma of cecum  CA - Cancer of colon  Chemotherapy-induced peripheral neuropathy  HTN (hypertension)  Hyperglobulinemia  Iron deficiency anemia  Liver metastases  Lung metastases  Morbid obesity  Peritoneal carcinomatosis  Historical  No qualifying data  Procedure/Surgical History  Colonoscopy (None) (11/14/2019)  Colonoscopy, flexible; diagnostic, including collection of specimen(s) by brushing or washing, when performed (separate procedure) (11/14/2019)  Inspection of Lower Intestinal Tract, Via Natural or Artificial Opening Endoscopic (11/14/2019)  Colon Tattoo (None) (09/27/2018)  Colonoscopy (None) (09/27/2018)  Colonoscopy, flexible; with directed submucosal injection(s), any substance (09/27/2018)  Colonoscopy, flexible; with removal of tumor(s), polyp(s), or other lesion(s) by snare technique (09/27/2018)  Excision of Transverse Colon, Via Natural or Artificial Opening Endoscopic, Diagnostic (09/27/2018)  Introduction of Other Therapeutic Substance into Lower GI, Via Natural or Artificial Opening Endoscopic (09/27/2018)  Polypectomy (None) (09/27/2018)  Catheter Insertion Mediport (None) (06/27/2018)  Fluoroscopy of Superior Vena Cava using Low Osmolar Contrast, Guidance (06/27/2018)  Insertion of Infusion Device into Superior Vena Cava, Percutaneous Approach (06/27/2018)  Insertion of tunneled centrally inserted central venous access device, with subcutaneous port; age 5 years or older (06/27/2018)  Appendectomy Laparoscopic (None) (05/31/2018)  Inspection of Lower Intestinal Tract, Percutaneous Endoscopic  Approach (05/31/2018)  Resection of Appendix, Open Approach (05/31/2018)  Resection of Right Large Intestine, Open Approach (05/31/2018)  colon resection   Medications  Avastin (for IVPB), 715 mg, 5 mg/kg, IV, Once-chemo  dexamethasone (for IVPB)  dexamethasone (for IVPB)  dexamethasone (for IVPB)  dexamethasone (for IVPB)  dexamethasone (for IVPB)  fluorouracil (for IVPB) -CCA  Heparin Flush 100 U/mL - 5 mL, 500 units= 5 mL, IV Push, Once-chemo  hydrALAZINE, 5 mg= 0.25 mL, IV Push, Once  hydrochlorothiazide-lisinopril 12.5 mg-20 mg oral tablet, 2 tab(s), Oral, Daily  levothyroxine 75 mcg (0.075 mg) oral tablet, 75 mcg= 1 tab(s), Oral, Daily, 3 refills  megestrol 40 mg/mL oral suspension, 800 mg= 20 mL, Oral, Daily, 1 refills  meperidine 25 mg/mL injectable solution, 25 mg= 1 mL, IM, Once  Phenergan, 12.5 mg= 0.5 mL, IM, Once  potassium chloride 10 mEq oral CAPSULE extended release, 10 mEq= 1 cap(s), Oral, Daily  Zofran (for IVPB) 16 mg + dexamethasone 10 mg/mL injectable solution 10 mg  Zofran (for IVPB) 16 mg + dexamethasone 10 mg/mL injectable solution 10 mg  Allergies  Norvasc?(Headache)  dilTIAZem?(Headache)  Social History  Abuse/Neglect  No, No, Yes, 06/12/2020  Alcohol  Never, 05/30/2018  Employment/School  Employed, 06/12/2018  Exercise  Exercise type: Walking., 10/31/2019  Home/Environment  Lives with Children, Spouse., 06/12/2018  Nutrition/Health  Regular, 06/12/2018  Sexual  Sexual orientation: Straight or heterosexual. Gender Identity Identifies as male., 01/10/2019  Spiritual/Cultural  Orthodox, 10/31/2019  Substance Use  Never, 11/12/2018  Tobacco  Never (less than 100 in lifetime), N/A, Household tobacco concerns: No. Smokeless Tobacco Use: Never., 06/12/2020  Family History  Alcoholism.: Negative: Father.  Heart failure.: Brother.  Primary malignant neoplasm of prostate: Father.  Renal failure syndrome.: Mother.  Immunizations  Vaccine Date Status   influenza virus vaccine, inactivated 11/19/2019  Given   influenza virus vaccine, inactivated 12/17/2018 Given       Potassium 3.3.? Alk phos 126.? Rest of CMP reviewed.? CEA level 38.2, beginning to rise again.  ?   Check magnesium level.  Start potassium chloride 40 mg p.o. daily.  Recheck magnesium and potassium levels in 2 weeks before office visit with nurse rao.

## 2022-05-04 NOTE — HISTORICAL [facility]
This is a historical note converted from [employer]. Formatting and pictures may have been removed.  Please reference [place] for original formatting and attached multimedia. History of Present Illness  39-year-old male who is status post right hemicolectomy for adenocarcinoma of the cecum 5/2018.? Subsequent colonoscopy revealed a 3 cm tubulovillous adenoma in the transverse colon in 9/2018.? No evidence of malignancy.? He is here for repeat colonoscopy this morning.? Having no melena, hematochezia, abdominal pain, or weight loss.? He is followed by oncology here at [facility].  Review of Systems  Constitutional: negative except as stated in HPI  Eye: negative except as stated in HPI  ENT: negative except as stated in HPI  Respiratory: negative except as stated in HPI  Cardiovascular: negative except as stated in HPI  Gastrointestinal: negative except as stated in HPI  Genitourinary: negative except as stated in HPI  Hema/Lymph: negative except as stated in HPI  Endocrine: negative except as stated in HPI  Immunologic: negative except as stated in HPI  Musculoskeletal: negative except as stated in HPI  Integumentary: negative except as stated in HPI  Neurologic: negative except as stated in HPI  ?  Physical Exam  VITAL SIGNS: ?Reviewed. ? ?  GENERAL: In no apparent distress  HEAD:? No signs of head trauma  EYES: Pupils are equal.? Extraocular motions intact  MOUTH: Oropharynx is clear  NECK: No adenopathy, no JVD??  CHEST: Clear breath sounds bilaterally.? No wheezes  CARDIAC: Regular rate and rhythm  ABDOMEN: Soft, nontender, nondistended, no rebound or guarding, no masses palpated, no CVA tenderness  VASCULAR: No edema  MUSCULOSKELETAL: Good range of motion of all major joints. Extremities without clubbing, cyanosis or edema  NEUROLOGIC EXAM: Alert and oriented x 3.? No focal sensory or strength deficits.?? Speech normal.? Follows commands  PSYCHIATRIC: Mood normal  SKIN: No visualized  rash  ?  Lab/Xray:  reviewed  ?  ?  Assessment/Plan  Discussed risks and benefits of colonoscopy in detail. ?All questions were answered. ?Written informed consents were obtained. ?Proceed with colonoscopy.   Problem List/Past Medical History  Ongoing  Adenocarcinoma of cecum  Chemotherapy-induced peripheral neuropathy  Hyperglobulinemia  Iron deficiency anemia  Morbid obesity  Historical  No qualifying data  Procedure/Surgical History  Colon Tattoo (None) (09/27/2018)  Colonoscopy (None) (09/27/2018)  Colonoscopy, flexible; with directed submucosal injection(s), any substance (09/27/2018)  Colonoscopy, flexible; with removal of tumor(s), polyp(s), or other lesion(s) by snare technique (09/27/2018)  Excision of Transverse Colon, Via Natural or Artificial Opening Endoscopic, Diagnostic (09/27/2018)  Introduction of Other Therapeutic Substance into Lower GI, Via Natural or Artificial Opening Endoscopic (09/27/2018)  Polypectomy (None) (09/27/2018)  Catheter Insertion Mediport (None) (06/27/2018)  Fluoroscopy of Superior Vena Cava using Low Osmolar Contrast, Guidance (06/27/2018)  Insertion of Infusion Device into Superior Vena Cava, Percutaneous Approach (06/27/2018)  Insertion of tunneled centrally inserted central venous access device, with subcutaneous port; age 5 years or older (06/27/2018)  Appendectomy Laparoscopic (None) (05/31/2018)  Inspection of Lower Intestinal Tract, Percutaneous Endoscopic Approach (05/31/2018)  Resection of Appendix, Open Approach (05/31/2018)  Resection of Right Large Intestine, Open Approach (05/31/2018)  colon resection   Medications  Inpatient  Avastin (for IVPB), 715 mg, 5 mg/kg, IV, Once-chemo  buffered lidocaine 2% - 0.5 ml syringe, 10 mg= 0.5 mL, Subcutaneous, As Directed  dexamethasone (for IVPB)  dexamethasone (for IVPB)  dexamethasone (for IVPB)  dexamethasone (for IVPB)  dexamethasone (for IVPB)  fluorouracil (for IVPB) -CCA  Heparin Flush 100 U/mL - 5 mL, 500 units= 5 mL, IV  Push, Once-chemo  hydrALAZINE, 5 mg= 0.25 mL, IV Push, Once  IVF Lactated Ringers LR Infusion 1,000 mL, 1000 mL, IV  meperidine 25 mg/mL injectable solution, 25 mg= 1 mL, IM, Once  Phenergan, 12.5 mg= 0.5 mL, IM, Once  Home  diltiazem 180 mg/24 hours oral CAPsule, extended release, 180 mg= 1 cap(s), Oral, Daily, 3 refills  hydrochlorothiazide-lisinopril 12.5 mg-20 mg oral tablet, 2 tab(s), Oral, Daily, 1 refills  Phenergan 25 mg Tab, 25 mg= 1 tab(s), Oral, q6hr, 1 refills  Zofran ODT 8 mg oral tablet, disintegrating, 8 mg= 1 tab(s), Oral, TID, 1 refills  Allergies  No Known Allergies  Social History  Abuse/Neglect  No, No, Yes, 11/14/2019  No, No, Yes, 11/11/2019  No, 11/04/2019  Alcohol  Never, 05/30/2018  Employment/School  Employed, 06/12/2018  Exercise  Exercise type: Walking., 10/31/2019  Home/Environment  Lives with Children, Spouse., 06/12/2018  Nutrition/Health  Regular, 06/12/2018  Sexual  Sexual orientation: Straight or heterosexual. Gender Identity Identifies as male., 01/10/2019  Spiritual/Cultural  [place], 10/31/2019  Substance Use  Never, 11/12/2018  Tobacco  Never (less than 100 in lifetime), N/A, 11/14/2019  Never (less than 100 in lifetime), N/A, 11/04/2019  Family History  Alcoholism.: Negative: Father.  Heart failure.: Brother.  Primary malignant neoplasm of prostate: Father.  Renal failure syndrome.: Mother.  Immunizations  Vaccine Date Status   influenza virus vaccine, inactivated 12/17/2018 Given